# Patient Record
Sex: MALE | Race: BLACK OR AFRICAN AMERICAN | Employment: OTHER | URBAN - METROPOLITAN AREA
[De-identification: names, ages, dates, MRNs, and addresses within clinical notes are randomized per-mention and may not be internally consistent; named-entity substitution may affect disease eponyms.]

---

## 2018-01-25 ENCOUNTER — OFFICE VISIT (OUTPATIENT)
Dept: INTERNAL MEDICINE CLINIC | Age: 78
End: 2018-01-25

## 2018-01-25 ENCOUNTER — HOSPITAL ENCOUNTER (OUTPATIENT)
Dept: LAB | Age: 78
Discharge: HOME | End: 2018-01-25
Payer: COMMERCIAL

## 2018-01-25 VITALS
OXYGEN SATURATION: 96 % | HEART RATE: 57 BPM | TEMPERATURE: 97.2 F | DIASTOLIC BLOOD PRESSURE: 89 MMHG | SYSTOLIC BLOOD PRESSURE: 163 MMHG | WEIGHT: 159 LBS | BODY MASS INDEX: 29.26 KG/M2 | RESPIRATION RATE: 16 BRPM | HEIGHT: 62 IN

## 2018-01-25 DIAGNOSIS — E11.9 TYPE 2 DIABETES MELLITUS WITHOUT COMPLICATION, WITHOUT LONG-TERM CURRENT USE OF INSULIN (HCC): Chronic | ICD-10-CM

## 2018-01-25 DIAGNOSIS — I25.10 CHRONIC CORONARY ARTERY DISEASE: Chronic | ICD-10-CM

## 2018-01-25 DIAGNOSIS — I50.9 CONGESTIVE HEART FAILURE, UNSPECIFIED CONGESTIVE HEART FAILURE CHRONICITY, UNSPECIFIED CONGESTIVE HEART FAILURE TYPE: Chronic | ICD-10-CM

## 2018-01-25 DIAGNOSIS — Z79.899 MEDICATION MANAGEMENT: ICD-10-CM

## 2018-01-25 DIAGNOSIS — E78.5 HYPERLIPIDEMIA, UNSPECIFIED HYPERLIPIDEMIA TYPE: Primary | Chronic | ICD-10-CM

## 2018-01-25 DIAGNOSIS — Z13.5 SCREENING FOR GLAUCOMA: ICD-10-CM

## 2018-01-25 DIAGNOSIS — E78.5 HYPERLIPIDEMIA, UNSPECIFIED HYPERLIPIDEMIA TYPE: Chronic | ICD-10-CM

## 2018-01-25 LAB
ALBUMIN SERPL-MCNC: 4.7 G/DL (ref 3.4–5)
ALBUMIN/GLOB SERPL: 1.1 {RATIO} (ref 0.8–1.7)
ALP SERPL-CCNC: 184 U/L (ref 45–117)
ALT SERPL-CCNC: 134 U/L (ref 16–61)
ANION GAP SERPL CALC-SCNC: 11 MMOL/L (ref 3–18)
AST SERPL-CCNC: 73 U/L (ref 15–37)
BILIRUB SERPL-MCNC: 1.2 MG/DL (ref 0.2–1)
BUN SERPL-MCNC: 19 MG/DL (ref 7–18)
BUN/CREAT SERPL: 13 (ref 12–20)
CALCIUM SERPL-MCNC: 9.5 MG/DL (ref 8.5–10.1)
CHLORIDE SERPL-SCNC: 103 MMOL/L (ref 100–108)
CHOLEST SERPL-MCNC: 129 MG/DL
CO2 SERPL-SCNC: 26 MMOL/L (ref 21–32)
CREAT SERPL-MCNC: 1.5 MG/DL (ref 0.6–1.3)
CREAT UR-MCNC: 52.1 MG/DL (ref 30–125)
GLOBULIN SER CALC-MCNC: 4.1 G/DL (ref 2–4)
GLUCOSE SERPL-MCNC: 84 MG/DL (ref 74–99)
HDLC SERPL-MCNC: 70 MG/DL (ref 40–60)
HDLC SERPL: 1.8 {RATIO} (ref 0–5)
LDL-C, EXTERNAL: 46
LDLC SERPL CALC-MCNC: 46 MG/DL (ref 0–100)
LIPID PROFILE,FLP: ABNORMAL
MICROALBUMIN UR-MCNC: 75.8 MG/DL (ref 0–3)
MICROALBUMIN/CREAT UR-RTO: 1455 MG/G (ref 0–30)
POTASSIUM SERPL-SCNC: 4.1 MMOL/L (ref 3.5–5.5)
PROT SERPL-MCNC: 8.8 G/DL (ref 6.4–8.2)
SODIUM SERPL-SCNC: 140 MMOL/L (ref 136–145)
TRIGL SERPL-MCNC: 65 MG/DL (ref ?–150)
VLDLC SERPL CALC-MCNC: 13 MG/DL

## 2018-01-25 PROCEDURE — 82043 UR ALBUMIN QUANTITATIVE: CPT | Performed by: INTERNAL MEDICINE

## 2018-01-25 PROCEDURE — 80061 LIPID PANEL: CPT | Performed by: INTERNAL MEDICINE

## 2018-01-25 PROCEDURE — 36415 COLL VENOUS BLD VENIPUNCTURE: CPT | Performed by: INTERNAL MEDICINE

## 2018-01-25 PROCEDURE — 80053 COMPREHEN METABOLIC PANEL: CPT | Performed by: INTERNAL MEDICINE

## 2018-01-25 RX ORDER — LISINOPRIL 40 MG/1
40 TABLET ORAL DAILY
COMMUNITY
End: 2018-07-16 | Stop reason: SDUPTHER

## 2018-01-25 RX ORDER — ISOSORBIDE MONONITRATE 30 MG/1
30 TABLET, EXTENDED RELEASE ORAL DAILY
Refills: 3 | COMMUNITY
Start: 2018-01-17 | End: 2019-02-02 | Stop reason: SDUPTHER

## 2018-01-25 RX ORDER — AMLODIPINE BESYLATE 10 MG/1
10 TABLET ORAL DAILY
Refills: 6 | COMMUNITY
Start: 2018-01-17 | End: 2020-01-27 | Stop reason: SDUPTHER

## 2018-01-25 RX ORDER — AMIODARONE HYDROCHLORIDE 200 MG/1
200 TABLET ORAL DAILY
Refills: 5 | COMMUNITY
Start: 2018-01-13 | End: 2018-03-05 | Stop reason: SDUPTHER

## 2018-01-25 RX ORDER — ATORVASTATIN CALCIUM 40 MG/1
40 TABLET, FILM COATED ORAL DAILY
Refills: 5 | COMMUNITY
Start: 2018-01-17 | End: 2018-11-09 | Stop reason: SDUPTHER

## 2018-01-25 RX ORDER — POTASSIUM CHLORIDE 750 MG/1
TABLET, FILM COATED, EXTENDED RELEASE ORAL
Refills: 3 | COMMUNITY
Start: 2017-11-25 | End: 2019-02-12 | Stop reason: SDUPTHER

## 2018-01-25 RX ORDER — OLANZAPINE 2.5 MG/1
2.5 TABLET ORAL DAILY
Refills: 6 | COMMUNITY
Start: 2017-12-30 | End: 2022-04-21

## 2018-01-25 RX ORDER — APIXABAN 5 MG/1
5 TABLET, FILM COATED ORAL 2 TIMES DAILY
COMMUNITY
Start: 2018-01-19 | End: 2019-05-06 | Stop reason: SDUPTHER

## 2018-01-25 RX ORDER — ASPIRIN 81 MG/1
TABLET ORAL
Refills: 5 | COMMUNITY
Start: 2017-12-30 | End: 2018-05-03 | Stop reason: SDUPTHER

## 2018-01-25 RX ORDER — LANOLIN ALCOHOL/MO/W.PET/CERES
CREAM (GRAM) TOPICAL
Refills: 1 | COMMUNITY
Start: 2017-12-24 | End: 2018-11-09 | Stop reason: SDUPTHER

## 2018-01-25 RX ORDER — CARVEDILOL 6.25 MG/1
6.25 TABLET ORAL 2 TIMES DAILY
Refills: 6 | COMMUNITY
Start: 2018-01-16 | End: 2018-09-26 | Stop reason: SDUPTHER

## 2018-01-25 RX ORDER — FLUTICASONE PROPIONATE 44 UG/1
AEROSOL, METERED RESPIRATORY (INHALATION)
COMMUNITY
End: 2020-12-22

## 2018-01-25 NOTE — MR AVS SNAPSHOT
Samra Anthony 
 
 
 [address] [facility] 83 [address] 
[phone] Patient: Brown Aiken MRN: [id number] Visit Information Date & Time Provider Department Dept. Phone Encounter #  
 1/25/2018 11:30 AM Kim Friend MD [address] [phone] [id number] Follow-up Instructions Return in about 6 weeks (around 3/8/2018) for 30 minutes, , HTN, DM, cholesterol. Your Appointments 8/1/2018  3:00 PM  
ULTRASOUND with [facility] ULTRASOUND [facility] ([facility]) Appt Note: Follow up in 1 year to review the results of his renal ultrasound, repeat a same-day PSA, and PVR. [address] [address]  
[phone]  
  
   
 [facility] 68 [address]  
  
    
 8/1/2018  3:30 PM  
ESTABLISHED PATIENT with Yeni Arana MD  
[facility] [address]) Appt Note: Return in about 1 year (around 8/1/2018). w/ sd  
 301 [facility] [place] [address]201 [facility] [address] [facility] [address]  
  
   
 301 [facility], [address] [place] [address] Upcoming Health Maintenance Date Due HEMOGLOBIN A1C Q6M 1940 LIPID PANEL Q1 1940 FOOT EXAM Q1 2/14/1950 MICROALBUMIN Q1 2/14/1950 EYE EXAM RETINAL OR DILATED Q1 2/14/1950 GLAUCOMA SCREENING Q2Y 2/14/2005 Pneumococcal 65+ High/Highest Risk (1 of 2 - PCV13) 2/14/2005 MEDICARE YEARLY EXAM 2/14/2005 ZOSTER VACCINE AGE 60> 1/25/2019* DTaP/Tdap/Td series (1 - Tdap) 1/25/2019* *Topic was postponed. The date shown is not the original due date. Allergies as of 1/25/2018  Review Complete On: 1/25/2018 By: Kim Friend MD  
 No Known Allergies Current Immunizations  Never Reviewed Name Date Influenza Vaccine 11/30/2015 12:00 AM  
  
 Not reviewed this visit You Were Diagnosed With   
  
 Codes Comments Hyperlipidemia, unspecified hyperlipidemia type    -  Primary ICD-10-CM: E78.5 ICD-9-CM: 272.4 Chronic coronary artery disease     ICD-10-CM: I25.10 ICD-9-CM: 414.00 Congestive heart failure, unspecified congestive heart failure chronicity, unspecified congestive heart failure type ([facility].)     ICD-10-CM: I50.9 ICD-9-CM: 428.0 Type 2 diabetes mellitus without complication, without long-term current use of insulin (HCC)     ICD-10-CM: E11.9 ICD-9-CM: 250.00 Screening for glaucoma     ICD-10-CM: Z13.5 ICD-9-CM: V80.1 Medication management     ICD-10-CM: Z79.899 ICD-9-CM: V58.69 Vitals BP Pulse Temp Resp Height(growth percentile) Weight(growth percentile) 163/89 (!) 57 97.2 °F (36.2 °C) (Oral) 16 5' 2\" (1.575 m) 159 lb (72.1 kg) SpO2 BMI Smoking Status 96% 29.08 kg/m2 Former Smoker BMI and BSA Data Body Mass Index Body Surface Area 29.08 kg/m 2 1.78 m 2 Preferred Pharmacy Pharmacy Name Phone [id number] [place], [address] [facility] [phone] Your Updated Medication List  
  
   
This list is accurate as of: 1/25/18 12:40 PM.  Always use your most recent med list.  
  
  
  
  
 amiodarone 200 mg tablet Commonly known as:  CORDARONE Take 200 mg by mouth daily. amLODIPine 10 mg tablet Commonly known as:  Bosie Shield Take 10 mg by mouth daily. aspirin delayed-release 81 mg tablet TAKE 1 TABLET BY MOUTH DAILY  
  
 atorvastatin 40 mg tablet Commonly known as:  LIPITOR Take 40 mg by mouth daily. carvedilol 6.25 mg tablet Commonly known as:  Lanedavid Mariano Take 6.25 mg by mouth two (2) times a day. Take 1 tab twice daily with morning and evening meals ELIQUIS 5 mg tablet Generic drug:  apixaban Take 5 mg by mouth two (2) times a day. FLOVENT HFA 44 mcg/actuation inhaler Generic drug:  fluticasone Take  by inhalation. 2 inhalation inhale orally every 12 ours isosorbide mononitrate ER 30 mg tablet Commonly known as:  IMDUR Take 30 mg by mouth daily. KLOR-CON 10 10 mEq tablet Generic drug:  potassium chloride SR  
TAKE 1 TABLET BY MOUTH DAILY  
  
 lisinopril 40 mg tablet Commonly known as:  Christy [facility] Take 40 mg by mouth daily. magnesium oxide 400 mg tablet Commonly known as:  MAG-OX  
TAKE 1 TABLET BY MOUTH DAILY  
  
 OLANZapine 2.5 mg tablet Commonly known as:  ZyPREXA Take 2.5 mg by mouth daily. Take 1 tab PO at bedtime We Performed the Following REFERRAL TO CARDIOLOGY [MXI90 Custom] Comments:  
 New patient visit. Has jf followed by [facility] but insurance changed REFERRAL TO OPHTHALMOLOGY [REF57 Custom] Follow-up Instructions Return in about 6 weeks (around 3/8/2018) for 30 minutes, , HTN, DM, cholesterol. To-Do List   
 01/25/2018 Lab:  LIPID PANEL   
  
 01/25/2018 Lab:  METABOLIC PANEL, COMPREHENSIVE   
  
 01/25/2018 Lab:  MICROALBUMIN, UR, RAND W/ MICROALBUMIN/CREA RATIO Referral Information Referral ID Referred By Referred To  
  
 [id number] [facility] Vince Acevedo MD   
   [facility] Phone: [phone] Fax: [phone] Visits Status Start Date End Date 1 New Request 1/25/18 1/25/19 If your referral has a status of pending review or denied, additional information will be sent to support the outcome of this decision. Referral ID Referred By Referred To  
 [id number] [facility] Not Available Visits Status Start Date End Date 1 New Request 1/25/18 1/25/19 If your referral has a status of pending review or denied, additional information will be sent to support the outcome of this decision. Introducing [facility]!    
 Vijay Velasco introduces [employer] patient portal. Now you can access parts of your medical record, email your doctor's office, and request medication refills online. 1. In your internet browser, go to [email or web address]. [employer]/[facility] 2. Click on the First Time User? Click Here link in the Sign In box. You will see the New Member Sign Up page. 3. Enter your [employer]ia Access Code exactly as it appears below. You will not need to use this code after youve completed the sign-up process. If you do not sign up before the expiration date, you must request a new code. · [employer] Access Code: [email or web address] Expires: 4/25/2018 12:40 PM 
 
4. Enter the last four digits of your Social Security Number (xxxx) and Date of Birth (mm/dd/yyyy) as indicated and click Submit. You will be taken to the next sign-up page. 5. Create a Listia ID. This will be your Listia login ID and cannot be changed, so think of one that is secure and easy to remember. 6. Create a Listia password. You can change your password at any time. 7. Enter your Password Reset Question and Answer. This can be used at a later time if you forget your password. 8. Enter your e-mail address. You will receive e-mail notification when new information is available in [address] 9. Click Sign Up. You can now view and download portions of your medical record. 10. Click the Download Summary menu link to download a portable copy of your medical information. If you have questions, please visit the Frequently Asked Questions section of the [employer] website. Remember, [employer]ia is NOT to be used for urgent needs. For medical emergencies, dial 911. Now available from your iPhone and Android! Please provide this summary of care documentation to your next provider. Your primary care clinician is listed as Álvaro Rangel. If you have any questions after today's visit, please call [phone].

## 2018-01-25 NOTE — PROGRESS NOTES
HISTORY OF PRESENT ILLNESS  Destinee Fraag is a 68 y.o. male. Visit Vitals    /89    Pulse (!) 57    Temp 97.2 °F (36.2 °C) (Oral)    Resp 16    Ht 5' 2\" (1.575 m)    Wt 159 lb (72.1 kg)    SpO2 96%    BMI 29.08 kg/m2       HPI Comments: Pt was seeing another PCP for several years. Insurance changed so he needs another PCP    See HX for full list  His main issue is cardiac related. Has h/o HTN, CAD, PAF and syncope. Also associated pulmonary HTN and pulmonic valve stenosis. He has been on medication to control his illness for several years  He has been followed by cardiology for years. Also diabetic, chronic, and treated with medication. New Patient   The history is provided by the patient. This is a new problem. Pertinent negatives include no chest pain, no abdominal pain, no headaches and no shortness of breath. Review of Systems   Constitutional: Negative for chills and fever. HENT: Negative. Negative for congestion, sore throat and tinnitus. Eyes:        Reading glasses   Respiratory: Negative for cough, shortness of breath and wheezing. Cardiovascular: Negative for chest pain, palpitations, claudication and leg swelling. Gastrointestinal: Positive for constipation (intermittent constipation. ). Negative for abdominal pain and vomiting.        H/o acid reflux. Musculoskeletal:        H/o gout. No recent attacks   Neurological: Negative for dizziness, sensory change, speech change, focal weakness and headaches. Psychiatric/Behavioral:        Dx'd with schizophrenia. Dx'd in [place]. Physical Exam   Constitutional: He is oriented to person, place, and time. He appears well-developed and well-nourished. No distress. Cardiovascular: Normal rate and regular rhythm. Pulmonary/Chest: Effort normal and breath sounds normal.   Musculoskeletal: He exhibits no edema. Neurological: He is alert and oriented to person, place, and time. Skin: Skin is warm and dry.  He is not diaphoretic. Psychiatric: He has a normal mood and affect. Nursing note and vitals reviewed. ASSESSMENT and PLAN    ICD-10-CM ICD-9-CM    1. Hyperlipidemia, unspecified hyperlipidemia type E78.5 272.4 amiodarone (CORDARONE) 200 mg tablet      amLODIPine (NORVASC) 10 mg tablet      ELIQUIS 5 mg tablet      aspirin delayed-release 81 mg tablet      atorvastatin (LIPITOR) 40 mg tablet      carvedilol (COREG) 6.25 mg tablet      REFERRAL TO CARDIOLOGY      LIPID PANEL   2. Chronic coronary artery disease I25.10 414.00 amiodarone (CORDARONE) 200 mg tablet      amLODIPine (NORVASC) 10 mg tablet      ELIQUIS 5 mg tablet      aspirin delayed-release 81 mg tablet      atorvastatin (LIPITOR) 40 mg tablet      carvedilol (COREG) 6.25 mg tablet      REFERRAL TO CARDIOLOGY      LIPID PANEL   3. Congestive heart failure, unspecified congestive heart failure chronicity, unspecified congestive heart failure type (HCC) I50.9 428.0 amiodarone (CORDARONE) 200 mg tablet      amLODIPine (NORVASC) 10 mg tablet      ELIQUIS 5 mg tablet      aspirin delayed-release 81 mg tablet      atorvastatin (LIPITOR) 40 mg tablet      carvedilol (COREG) 6.25 mg tablet      REFERRAL TO CARDIOLOGY   4. Type 2 diabetes mellitus without complication, without long-term current use of insulin ([facility]) G71.3 336.42 METABOLIC PANEL, COMPREHENSIVE      MICROALBUMIN, UR, RAND W/ MICROALBUMIN/CREA RATIO   5. Screening for glaucoma Z13.5 V80.1 REFERRAL TO OPHTHALMOLOGY   6. Medication management Z79.899 V58.69 isosorbide mononitrate ER (IMDUR) 30 mg tablet      magnesium oxide (MAG-OX) 400 mg tablet      OLANZapine (ZYPREXA) 2.5 mg tablet      KLOR-CON 10 10 mEq tablet      lisinopril (PRINIVIL, ZESTRIL) 40 mg tablet      fluticasone (FLOVENT HFA) 44 mcg/actuation inhaler       Past medical history, surgical history, family history, and social history reviewed with patient    Available hospital record reviewed    BP is up today.  But we are just getting to know him and will observe for now    Refer to cardiology for f/u    Refer to ophthalmology    Update lab    Incidentally,  Discussed BMI/weight, lifestyle, diet and exercise. Discussed effect on blood pressure, blood sugar, and joints especially  Focus on limiting white carbs, portion control, and moving more.     F/u 6-8 weeks

## 2018-01-25 NOTE — PROGRESS NOTES
ROOM # 6    Sascha Florentino presents today for   Chief Complaint   Patient presents with    New Patient       Sascha Florentino preferred language for health care discussion is english/other. Is someone accompanying this pt? no    Is the patient using any DME equipment during [address]? no    Depression Screening:  PHQ over the last two weeks 1/25/2018   Little interest or pleasure in doing things Not at all   Feeling down, depressed or hopeless Not at all   Total Score PHQ 2 0       Learning Assessment:  Learning Assessment 1/25/2018   PRIMARY LEARNER Patient   HIGHEST LEVEL OF EDUCATION - PRIMARY LEARNER  DID NOT GRADUATE HIGH SCHOOL   BARRIERS PRIMARY LEARNER NONE   CO-LEARNER CAREGIVER No   PRIMARY LANGUAGE ENGLISH   LEARNER PREFERENCE PRIMARY VIDEOS   ANSWERED BY patient   RELATIONSHIP SELF       Abuse Screening:  No flowsheet data found. Fall Risk  Fall Risk Assessment, last 12 mths 1/25/2018   Able to walk? Yes   Fall in past 12 months? Yes   Fall with injury? Yes   Number of falls in past 12 months 1   Fall Risk Score 2       Health Maintenance reviewed and discussed per provider. Yes    Requesting records from [address] [place] [address] Directive:  1. Do you have an advance directive in place? Patient Reply: yes     2. If not, would you like material regarding how to put one in place? Patient Reply: no    Coordination of Care:  1. Have you been to the ER, urgent care clinic since your last visit? Hospitalized since your last visit? Yes [facility] for chest weakness    2. Have you seen or consulted any other health care providers outside of the [address] [place] [address] since your last visit? Include any pap smears or colon screening.  no

## 2018-01-27 DIAGNOSIS — R79.89 ABNORMAL LFTS: Primary | ICD-10-CM

## 2018-02-03 ENCOUNTER — HOSPITAL ENCOUNTER (OUTPATIENT)
Dept: LAB | Age: 78
Discharge: HOME | End: 2018-02-03
Payer: COMMERCIAL

## 2018-02-03 ENCOUNTER — OFFICE VISIT (OUTPATIENT)
Dept: INTERNAL MEDICINE CLINIC | Age: 78
End: 2018-02-03

## 2018-02-03 VITALS
HEIGHT: 62 IN | SYSTOLIC BLOOD PRESSURE: 153 MMHG | WEIGHT: 157 LBS | RESPIRATION RATE: 14 BRPM | HEART RATE: 54 BPM | OXYGEN SATURATION: 93 % | TEMPERATURE: 96.9 F | BODY MASS INDEX: 28.89 KG/M2 | DIASTOLIC BLOOD PRESSURE: 83 MMHG

## 2018-02-03 DIAGNOSIS — M54.31 SCIATICA OF RIGHT SIDE: Primary | ICD-10-CM

## 2018-02-03 DIAGNOSIS — R79.89 ABNORMAL LFTS: ICD-10-CM

## 2018-02-03 DIAGNOSIS — I10 ESSENTIAL HYPERTENSION: ICD-10-CM

## 2018-02-03 PROBLEM — E11.21 TYPE 2 DIABETES MELLITUS WITH NEPHROPATHY (HCC): Status: ACTIVE | Noted: 2018-02-03

## 2018-02-03 LAB
IRON SATN MFR SERPL: 21 %
IRON SERPL-MCNC: 79 UG/DL (ref 50–175)
TIBC SERPL-MCNC: 381 UG/DL (ref 250–450)

## 2018-02-03 PROCEDURE — 86803 HEPATITIS C AB TEST: CPT | Performed by: INTERNAL MEDICINE

## 2018-02-03 PROCEDURE — 86038 ANTINUCLEAR ANTIBODIES: CPT | Performed by: INTERNAL MEDICINE

## 2018-02-03 PROCEDURE — 86706 HEP B SURFACE ANTIBODY: CPT | Performed by: INTERNAL MEDICINE

## 2018-02-03 PROCEDURE — 83540 ASSAY OF IRON: CPT | Performed by: INTERNAL MEDICINE

## 2018-02-03 PROCEDURE — 84155 ASSAY OF PROTEIN SERUM: CPT | Performed by: INTERNAL MEDICINE

## 2018-02-03 PROCEDURE — 87340 HEPATITIS B SURFACE AG IA: CPT | Performed by: INTERNAL MEDICINE

## 2018-02-03 PROCEDURE — 86704 HEP B CORE ANTIBODY TOTAL: CPT | Performed by: INTERNAL MEDICINE

## 2018-02-03 NOTE — MR AVS SNAPSHOT
[facility] 
 
 
 [address] [address] 
[phone] Patient: Zuly Grullon MRN: [id number] NNM:[id number] Visit Information Date & Time Provider Department Dept. Phone Encounter #  
 2/3/2018 11:15 AM Loree Cortez, [address] [place] [address] [phone] Follow-up Instructions Return if symptoms worsen or fail to improve. Your Appointments 2/9/2018  1:30 PM  
New Patient with Jase Clay MD  
Cardio Specialist at [address]) Appt Note: NP ref by Dr Hue Cardenas, prev cardio at [facility], Referral in [address] [place] [address]. -[facility] [address] [address]  
  
   
 [address] [facility] [address]  
  
    
 3/8/2018  2:30 PM  
Office Visit with Jagdish Liriano MD  
[address] [place] [address] ([address] [place] [address] Road) Appt Note: 6 week f/u htn, dm, chol  
 [address]  
  
    
 8/1/2018  3:00 PM  
ULTRASOUND with [facility] ULTRASOUND [facility] ([address] [place] [address]) Appt Note: Follow up in 1 year to review the results of his renal ultrasound, repeat a same-day PSA, and PVR. [facility] [address] [facility] [address]  
[phone]  
  
   
 [facility] [address] [id number]  
  
    
 8/1/2018  3:30 PM  
ESTABLISHED PATIENT with Christine Dalton MD  
Urology of [address]) Appt Note: Return in about 1 year (around 8/1/2018). w/ sd  
 [address], [place] [address]  
  
   
 [facility], [address] [facility] [address] Upcoming Health Maintenance Date Due HEMOGLOBIN A1C Q6M 1940 FOOT EXAM Q1 2/14/1950 EYE EXAM RETINAL OR DILATED Q1 2/14/1950 GLAUCOMA SCREENING Q2Y 2/14/2005 Pneumococcal 65+ High/Highest Risk (1 of 2 - PCV13) 2/14/2005 MEDICARE YEARLY EXAM 2/14/2005 ZOSTER VACCINE AGE 60> 1/25/2019* DTaP/Tdap/Td series (1 - Tdap) 1/25/2019* MICROALBUMIN Q1 1/25/2019 LIPID PANEL Q1 1/25/2019 *Topic was postponed. The date shown is not the original due date. Allergies as of 2/3/2018  Review Complete On: 2/3/2018 By: Mary Andre, DO No Known Allergies Current Immunizations  Never Reviewed Name Date Influenza Vaccine 11/30/2015 12:00 AM  
  
 Not reviewed this visit You Were Diagnosed With   
  
 Codes Comments Sciatica of right side    -  Primary ICD-10-CM: M54.31 
ICD-9-CM: 724.3 Vitals BP Pulse Temp Resp Height(growth percentile) Weight(growth percentile) 153/83 (BP 1 Location: Right arm, BP Patient Position: Sitting) (!) 54 96.9 °F (36.1 °C) (Oral) 14 5' 2\" (1.575 m) 157 lb (71.2 kg) SpO2 BMI Smoking Status 93% 28.72 kg/m2 Former Smoker Vitals History BMI and BSA Data Body Mass Index Body Surface Area 28.72 kg/m 2 1.76 m 2 Preferred Pharmacy Pharmacy Name Phone [id number], [address] [facility] Aminah Buenrostro [phone] Your Updated Medication List  
  
   
This list is accurate as of: 2/3/18 11:48 AM.  Always use your most recent med list.  
  
  
  
  
 amiodarone 200 mg tablet Commonly known as:  CORDARONE Take 200 mg by mouth daily. amLODIPine 10 mg tablet Commonly known as:  Senora Gretel Take 10 mg by mouth daily. aspirin delayed-release 81 mg tablet TAKE 1 TABLET BY MOUTH DAILY  
  
 atorvastatin 40 mg tablet Commonly known as:  LIPITOR Take 40 mg by mouth daily. carvedilol 6.25 mg tablet Commonly known as:  Ivania Coco Take 6.25 mg by mouth two (2) times a day. Take 1 tab twice daily with morning and evening meals ELIQUIS 5 mg tablet Generic drug:  apixaban Take 5 mg by mouth two (2) times a day. FLOVENT HFA 44 mcg/actuation inhaler Generic drug:  fluticasone Take  by inhalation. 2 inhalation inhale orally every 12 ours  
  
 isosorbide mononitrate ER 30 mg tablet Commonly known as:  IMDUR Take 30 mg by mouth daily. KLOR-CON 10 10 mEq tablet Generic drug:  potassium chloride SR  
TAKE 1 TABLET BY MOUTH DAILY  
  
 lisinopril 40 mg tablet Commonly known as:  Metta Lawman Take 40 mg by mouth daily. magnesium oxide 400 mg tablet Commonly known as:  MAG-OX  
TAKE 1 TABLET BY MOUTH DAILY  
  
 OLANZapine 2.5 mg tablet Commonly known as:  ZyPREXA Take 2.5 mg by mouth daily. Take 1 tab PO at bedtime Follow-up Instructions Return if symptoms worsen or fail to improve. Patient Instructions Sciatica: Care Instructions Your Care Instructions Sciatica (say \"ytv-YJ-mv-kuh\") is an irritation of one of the sciatic nerves, which come from the spinal cord in the lower back. The sciatic nerves and their branches extend down through the buttock to the foot. Sciatica can develop when an injured disc in the back presses against a spinal nerve root. Its main symptom is pain, numbness, or weakness that is often worse in the leg or foot than in the back. Sciatica often will improve and go away with time. Early treatment usually includes medicines and exercises to relieve pain. Follow-up care is a key part of your treatment and safety. Be sure to make and go to all appointments, and call your doctor if you are having problems. It's also a good idea to know your test results and keep a list of the medicines you take. How can you care for yourself at home? · Take pain medicines exactly as directed. ¨ If the doctor gave you a prescription medicine for pain, take it as prescribed. ¨ If you are not taking a prescription pain medicine, ask your doctor if you can take an over-the-counter medicine. · Use heat or ice to relieve pain. ¨ To apply heat, put a warm water bottle, heating pad set on low, or warm cloth on your back. Do not go to sleep with a heating pad on your skin. ¨ To use ice, put ice or a cold pack on the area for 10 to 20 minutes at a time. Put a thin cloth between the ice and your skin. · Avoid sitting if possible, unless it feels better than standing. · Alternate lying down with short walks. Increase your walking distance as you are able to without making your symptoms worse. · Do not do anything that makes your symptoms worse. When should you call for help? Call 911 anytime you think you may need emergency care. For example, call if: 
· You are unable to move a leg at all. Call your doctor now or seek immediate medical care if: 
· You have new or worse symptoms in your legs or buttocks. Symptoms may include: ¨ Numbness or tingling. ¨ Weakness. ¨ Pain. · You lose bladder or bowel control. Watch closely for changes in your health, and be sure to contact your doctor if: 
· You are not getting better as expected. Where can you learn more? Go to http://venkatesh-fran.info/. Enter [phone] in the search box to learn more about \"Sciatica: Care Instructions. \" Current as of: March 21, 2017 Content Version: 11.4 © 8917-9083 FreeBorders. Care instructions adapted under license by Codewise (which disclaims liability or warranty for this information). If you have questions about a medical condition or this instruction, always ask your healthcare professional. [place] [address] any warranty or liability for your use of this information. Back Stretches: Exercises Your Care Instructions Here are some examples of exercises for stretching your back. Start each exercise slowly. Ease off the exercise if you start to have pain. Your doctor or physical therapist will tell you when you can start these exercises and which ones will work best for you. How to do the exercises Overhead stretch 1. Stand comfortably with your feet shoulder-width apart. 2. Looking straight ahead, raise both arms over your head and reach toward the ceiling. Do not allow your head to tilt back. 3. Hold for 15 to 30 seconds, then lower your arms to your sides. 4. Repeat 2 to 4 times. Side stretch 1. Stand comfortably with your feet shoulder-width apart. 2. Raise one arm over your head, and then lean to the other side. 3. Slide your hand down your leg as you let the weight of your arm gently stretch your side muscles. Hold for 15 to 30 seconds. 4. Repeat 2 to 4 times on each side. Press-up 1. Lie on your stomach, supporting your body with your forearms. 2. Press your elbows down into the floor to raise your upper back. As you do this, relax your stomach muscles and allow your back to arch without using your back muscles. As your press up, do not let your hips or pelvis come off the floor. 3. Hold for 15 to 30 seconds, then relax. 4. Repeat 2 to 4 times. Relax and rest 
 
1. Lie on your back with a rolled towel under your neck and a pillow under your knees. Extend your arms comfortably to your sides. 2. Relax and breathe normally. 3. Remain in this position for about 10 minutes. 4. If you can, do this 2 or 3 times each day. Follow-up care is a key part of your treatment and safety. Be sure to make and go to all appointments, and call your doctor if you are having problems. It's also a good idea to know your test results and keep a list of the medicines you take. Where can you learn more? Go to http://venkatesh-fran.info/. Enter G177 in the search box to learn more about \"Back Stretches: Exercises. \" Current as of: March 21, 2017 Content Version: 11.4 © 0167-1453 Healthwise, Incorporated.  Care instructions adapted under license by Peerio (which disclaims liability or warranty for this information). If you have questions about a medical condition or this instruction, always ask your healthcare professional. [address] any warranty or liability for your use of this information. Sciatica: Exercises Your Care Instructions Here are some examples of typical rehabilitation exercises for your condition. Start each exercise slowly. Ease off the exercise if you start to have pain. Your doctor or physical therapist will tell you when you can start these exercises and which ones will work best for you. When you are not being active, find a comfortable position for rest. Some people are comfortable on the floor or a medium-firm bed with a small pillow under their head and another under their knees. Some people prefer to lie on their side with a pillow between their knees. Don't stay in one position for too long. Take short walks (10 to 20 minutes) every 2 to 3 hours. Avoid slopes, hills, and stairs until you feel better. Walk only distances you can manage without pain, especially leg pain. How to do the exercises Back stretches 5. Get down on your hands and knees on the floor. 6. Relax your head and allow it to droop. Round your back up toward the ceiling until you feel a nice stretch in your upper, middle, and lower back. Hold this stretch for as long as it feels comfortable, or about 15 to 30 seconds. 7. Return to the starting position with a flat back while you are on your hands and knees. 8. Let your back sway by pressing your stomach toward the floor. Lift your buttocks toward the ceiling. 9. Hold this position for 15 to 30 seconds. 10. Repeat 2 to 4 times. Follow-up care is a key part of your treatment and safety. Be sure to make and go to all appointments, and call your doctor if you are having problems. It's also a good idea to know your test results and keep a list of the medicines you take. Where can you learn more? Go to http://venkatesh-fran.info/. Enter U536 in the search box to learn more about \"Sciatica: Exercises. \" Current as of: March 21, 2017 Content Version: 11.4 © 6694-3336 Healthwise, [employer]. Care instructions adapted under license by gokit (which disclaims liability or warranty for this information). If you have questions about a medical condition or this instruction, always ask your healthcare professional. [place] [address] any warranty or liability for your use of this information. Introducing \[facility] [place] [facility]\"" & HEALTH SERVICES! Sheila Arteaga introduces Personal On Demand patient portal. Now you can access parts of your medical record, email your doctor's office, and request medication refills online. 1. In your internet browser, go to [email or web address] 2. Click on the First Time User? Click Here link in the Sign In box. You will see the New Member Sign Up page. 3. Enter your Personal On Demand Access Code exactly as it appears below. You will not need to use this code after youve completed the sign-up process. If you do not sign up before the expiration date, you must request a new code. · Personal On Demand Access Code: [email or web address] Expires: 4/25/2018 12:40 PM 
 
4. Enter the last four digits of your Social Security Number (xxxx) and Date of Birth (mm/dd/yyyy) as indicated and click Submit. You will be taken to the next sign-up page. 5. Create a Personal On Demand ID. This will be your Personal On Demand login ID and cannot be changed, so think of one that is secure and easy to remember. 6. Create a Personal On Demand password. You can change your password at any time. 7. Enter your Password Reset Question and Answer. This can be used at a later time if you forget your password. 8. Enter your e-mail address. You will receive e-mail notification when new information is available in [address] 9. Click Sign Up. You can now view and download portions of your medical record. 10. Click the Download Summary menu link to download a portable copy of your medical information. If you have questions, please visit the Frequently Asked Questions section of the Openera website. Remember, Openera is NOT to be used for urgent needs. For medical emergencies, dial 911. Now available from your iPhone and Android! Please provide this summary of care documentation to your next provider. Your primary care clinician is listed as Carol Walls. If you have any questions after today's visit, please call [phone].

## 2018-02-03 NOTE — PROGRESS NOTES
Patient presents to the clinic for right side lower back pain that has gotten worse. Patient complains of pain radiating down his right leg and numbness. Flu Shot Requested: received    Depression Screening:  PHQ over the last two weeks 2/3/2018 1/25/2018   Little interest or pleasure in doing things Not at all Not at all   Feeling down, depressed or hopeless Not at all Not at all   Total Score PHQ 2 0 0       Learning Assessment:  Learning Assessment 1/25/2018   PRIMARY LEARNER Patient   HIGHEST LEVEL OF EDUCATION - PRIMARY LEARNER  DID NOT GRADUATE HIGH SCHOOL   BARRIERS PRIMARY LEARNER NONE   CO-LEARNER CAREGIVER No   PRIMARY LANGUAGE ENGLISH   LEARNER PREFERENCE PRIMARY VIDEOS   ANSWERED BY patient   RELATIONSHIP SELF       Abuse Screening:  No flowsheet data found. Health Maintenance reviewed and discussed per provider: no     Coordination of Care:    1. Have you been to the ER, urgent care clinic since your last visit? Hospitalized since your last visit? yes    2. Have you seen or consulted any other health care providers outside of the [address] [place] [address] since your last visit? Include any pap smears or colon screening.  no

## 2018-02-03 NOTE — PATIENT INSTRUCTIONS
Sciatica: Care Instructions  Your Care Instructions    Sciatica (say \"tot-XL-fq-kuh\") is an irritation of one of the sciatic nerves, which come from the spinal cord in the lower back. The sciatic nerves and their branches extend down through the buttock to the foot. Sciatica can develop when an injured disc in the back presses against a spinal nerve root. Its main symptom is pain, numbness, or weakness that is often worse in the leg or foot than in the back. Sciatica often will improve and go away with time. Early treatment usually includes medicines and exercises to relieve pain. Follow-up care is a key part of your treatment and safety. Be sure to make and go to all appointments, and call your doctor if you are having problems. It's also a good idea to know your test results and keep a list of the medicines you take. How can you care for yourself at home? · Take pain medicines exactly as directed. ¨ If the doctor gave you a prescription medicine for pain, take it as prescribed. ¨ If you are not taking a prescription pain medicine, ask your doctor if you can take an over-the-counter medicine. · Use heat or ice to relieve pain. ¨ To apply heat, put a warm water bottle, heating pad set on low, or warm cloth on your back. Do not go to sleep with a heating pad on your skin. ¨ To use ice, put ice or a cold pack on the area for 10 to 20 minutes at a time. Put a thin cloth between the ice and your skin. · Avoid sitting if possible, unless it feels better than standing. · Alternate lying down with short walks. Increase your walking distance as you are able to without making your symptoms worse. · Do not do anything that makes your symptoms worse. When should you call for help? Call 911 anytime you think you may need emergency care. For example, call if:  · You are unable to move a leg at all.   Call your doctor now or seek immediate medical care if:  · You have new or worse symptoms in your legs or buttocks. Symptoms may include:  ¨ Numbness or tingling. ¨ Weakness. ¨ Pain. · You lose bladder or bowel control. Watch closely for changes in your health, and be sure to contact your doctor if:  · You are not getting better as expected. Where can you learn more? Go to http://venkatesh-fran.info/. Enter [phone] in the search box to learn more about \"Sciatica: Care Instructions. \"  Current as of: March 21, 2017  Content Version: 11.4  © 1222-1571 iZoca. Care instructions adapted under license by dooyoo (which disclaims liability or warranty for this information). If you have questions about a medical condition or this instruction, always ask your healthcare professional. [address] any warranty or liability for your use of this information. Back Stretches: Exercises  Your Care Instructions  Here are some examples of exercises for stretching your back. Start each exercise slowly. Ease off the exercise if you start to have pain. Your doctor or physical therapist will tell you when you can start these exercises and which ones will work best for you. How to do the exercises  Overhead stretch    1. Stand comfortably with your feet shoulder-width apart. 2. Looking straight ahead, raise both arms over your head and reach toward the ceiling. Do not allow your head to tilt back. 3. Hold for 15 to 30 seconds, then lower your arms to your sides. 4. Repeat 2 to 4 times. Side stretch    1. Stand comfortably with your feet shoulder-width apart. 2. Raise one arm over your head, and then lean to the other side. 3. Slide your hand down your leg as you let the weight of your arm gently stretch your side muscles. Hold for 15 to 30 seconds. 4. Repeat 2 to 4 times on each side. Press-up    1. Lie on your stomach, supporting your body with your forearms. 2. Press your elbows down into the floor to raise your upper back.  As you do this, relax your stomach muscles and allow your back to arch without using your back muscles. As your press up, do not let your hips or pelvis come off the floor. 3. Hold for 15 to 30 seconds, then relax. 4. Repeat 2 to 4 times. Relax and rest    1. Lie on your back with a rolled towel under your neck and a pillow under your knees. Extend your arms comfortably to your sides. 2. Relax and breathe normally. 3. Remain in this position for about 10 minutes. 4. If you can, do this 2 or 3 times each day. Follow-up care is a key part of your treatment and safety. Be sure to make and go to all appointments, and call your doctor if you are having problems. It's also a good idea to know your test results and keep a list of the medicines you take. Where can you learn more? Go to http://venkateshWatchPartyfran.info/. Enter H264 in the search box to learn more about \"Back Stretches: Exercises. \"  Current as of: March 21, 2017  Content Version: 11.4  © 9445-8704 [employer]arma. Care instructions adapted under license by Deckerton (which disclaims liability or warranty for this information). If you have questions about a medical condition or this instruction, always ask your healthcare professional. [address] any warranty or liability for your use of this information. Sciatica: Exercises  Your Care Instructions  Here are some examples of typical rehabilitation exercises for your condition. Start each exercise slowly. Ease off the exercise if you start to have pain. Your doctor or physical therapist will tell you when you can start these exercises and which ones will work best for you. When you are not being active, find a comfortable position for rest. Some people are comfortable on the floor or a medium-firm bed with a small pillow under their head and another under their knees. Some people prefer to lie on their side with a pillow between their knees.  Don't stay in one position for too long. Take short walks (10 to 20 minutes) every 2 to 3 hours. Avoid slopes, hills, and stairs until you feel better. Walk only distances you can manage without pain, especially leg pain. How to do the exercises  Back stretches    5. Get down on your hands and knees on the floor. 6. Relax your head and allow it to droop. Round your back up toward the ceiling until you feel a nice stretch in your upper, middle, and lower back. Hold this stretch for as long as it feels comfortable, or about 15 to 30 seconds. 7. Return to the starting position with a flat back while you are on your hands and knees. 8. Let your back sway by pressing your stomach toward the floor. Lift your buttocks toward the ceiling. 9. Hold this position for 15 to 30 seconds. 10. Repeat 2 to 4 times. Follow-up care is a key part of your treatment and safety. Be sure to make and go to all appointments, and call your doctor if you are having problems. It's also a good idea to know your test results and keep a list of the medicines you take. Where can you learn more? Go to http://venkatesh-fran.info/. Enter D129 in the search box to learn more about \"Sciatica: Exercises. \"  Current as of: March 21, 2017  Content Version: 11.4  © 5886-2532 Healthwise, Incorporated. Care instructions adapted under license by [employer] (which disclaims liability or warranty for this information). If you have questions about a medical condition or this instruction, always ask your healthcare professional. [place] [address] any warranty or liability for your use of this information.

## 2018-02-03 NOTE — PROGRESS NOTES
SUBJECTIVE:   HPI:  Fahad Hope is a 68 y.o. male who complains of right lower back pain/numbness in buttocks x 1 week. Patient has had this problem before. Patient is new to this practice and saw Dr. Ellen Artis  Last week. Here for lab work today to check elevated liver enzymes. Patient reports pain/numnbess on right side/buttocks worse while sitting; occasionally laying on right side. Denies any weakness, no bladder/bowel incontinence. Sitting on hard chairs make pain/numbness worse on right buttocks and he has to lean to left side.       ROS:    · General: negative for chills, fever, weight changes or malaise  · Respiratory: no cough, shortness of breath, or wheezing  · Cardiovascular: no chest pain, palpitations, or dyspnea on exertion  · Gastrointestinal: no GERD or history of PUD, abdominal pain, N/V, change in bowel habits, or black or bloody stools  · Musculoskeletal: no muscle weakness  · Neurological: +numbness, tingling in right buttocks/back, headache or dizziness    Past Medical History:   Diagnosis Date    Benign non-nodular prostatic hyperplasia with lower urinary tract symptoms     Beta thalassemia trait     CAD (coronary artery disease)     Cardiomyopathy ([address]     EF 30%    Diabetes mellitus ([address]     Diverticulosis     Elevated PSA     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 Vernell Alvarez     ORTHOPAEDIC  7062'X    wound repair L. arm     Outpatient Prescriptions Marked as Taking for the 2/3/18 encounter (Office Visit) with Sera Cosby, DO   Medication Sig Dispense Refill    amiodarone (CORDARONE) 200 mg tablet Take 200 mg by mouth daily. 5    amLODIPine (NORVASC) 10 mg tablet Take 10 mg by mouth daily. 6    ELIQUIS 5 mg tablet Take 5 mg by mouth two (2) times a day.  aspirin delayed-release 81 mg tablet TAKE 1 TABLET BY MOUTH DAILY  5    atorvastatin (LIPITOR) 40 mg tablet Take 40 mg by mouth daily. 5    carvedilol (COREG) 6.25 mg tablet Take 6.25 mg by mouth two (2) times a day. Take 1 tab twice daily with morning and evening meals  6    isosorbide mononitrate ER (IMDUR) 30 mg tablet Take 30 mg by mouth daily. 3    magnesium oxide (MAG-OX) 400 mg tablet TAKE 1 TABLET BY MOUTH DAILY  1    OLANZapine (ZYPREXA) 2.5 mg tablet Take 2.5 mg by mouth daily. Take 1 tab PO at bedtime  6    KLOR-CON 10 10 mEq tablet TAKE 1 TABLET BY MOUTH DAILY  3    lisinopril (PRINIVIL, ZESTRIL) 40 mg tablet Take 40 mg by mouth daily.  fluticasone (FLOVENT HFA) 44 mcg/actuation inhaler Take  by inhalation. 2 inhalation inhale orally every 12 ours       No Known Allergies    OBJECTIVE:  Visit Vitals    /83 (BP 1 Location: Right arm, BP Patient Position: Sitting)    Pulse (!) 54    Temp 96.9 °F (36.1 °C) (Oral)    Resp 14    Ht 5' 2\" (1.575 m)    Wt 157 lb (71.2 kg)    SpO2 93%    BMI 28.72 kg/m2      GEN: awake, alert, orientated, in no acute distress  CV:  The heart sounds are regular in rate and rhythm. There is a normal S1 and S2. There or no murmurs, rubs, or gallops. Distal pulses are intact and equal. No peripheral edema. Lungs:  Lung sounds are clear and equal to auscultation throughout all lung fields. BACK: upon initial exam noted patient to have bulky wallet in right side back pocket. Minimal tenders on right gluteal area.   NEURO: CNII-XII grossly intact, normal gait, normal sensation    ASSESSMENT/PLAN:   1. Sciatica of right side - no red flags. Suspect wallet on right side provoking sciatic nerve or possible piriformis syndrome. After patient removed wallet he was able to sit straight in chair and actually started feeling a little better. Recommended home back exercises/stretches to perform daily (printed handouts). Recommended OTC epsom salt rub gel to use as needed. Recommend Tylenol as needed for pain. Return if symptoms worsen or do not improve over the next 4-6 weeks. 2. Essential hypertension - slightly elevated today above goal <140/90. Obtaining screening labs today - for liver disease per primary care. Follow-up with Dr. Hue Cardenas as scheduled next month for continued management.

## 2018-02-04 LAB — HBV CORE AB SERPL QL IA: NEGATIVE

## 2018-02-05 LAB
ALBUMIN SERPL ELPH-MCNC: 4 G/DL (ref 2.9–4.4)
ALBUMIN/GLOB SERPL: 1.2 {RATIO} (ref 0.7–1.7)
ALPHA1 GLOB SERPL ELPH-MCNC: 0.3 G/DL (ref 0–0.4)
ALPHA2 GLOB SERPL ELPH-MCNC: 0.8 G/DL (ref 0.4–1)
ANA SER QL: NEGATIVE
B-GLOBULIN SERPL ELPH-MCNC: 1.4 G/DL (ref 0.7–1.3)
GAMMA GLOB SERPL ELPH-MCNC: 0.8 G/DL (ref 0.4–1.8)
GLOBULIN SER CALC-MCNC: 3.4 G/DL (ref 2.2–3.9)
HBV SURFACE AB SER QL IA: NEGATIVE
HBV SURFACE AB SERPL IA-ACNC: <3.1 MIU/ML
HBV SURFACE AG SER QL: <0.1 INDEX
HBV SURFACE AG SER QL: NEGATIVE
HCV AB S/CO SERPL IA: 0.1 S/CO RATIO (ref 0–0.9)
HCV AB SERPL QL IA: NORMAL
HEP BS AB COMMENT,HBSAC: ABNORMAL
M PROTEIN SERPL ELPH-MCNC: ABNORMAL G/DL
PROT SERPL-MCNC: 7.4 G/DL (ref 6–8.5)
SEE BELOW:, 164879: NORMAL

## 2018-03-01 ENCOUNTER — OFFICE VISIT (OUTPATIENT)
Dept: CARDIOLOGY CLINIC | Age: 78
End: 2018-03-01

## 2018-03-01 VITALS
BODY MASS INDEX: 28.16 KG/M2 | HEIGHT: 62 IN | HEART RATE: 74 BPM | SYSTOLIC BLOOD PRESSURE: 150 MMHG | OXYGEN SATURATION: 91 % | WEIGHT: 153 LBS | DIASTOLIC BLOOD PRESSURE: 89 MMHG

## 2018-03-01 DIAGNOSIS — E11.21 TYPE 2 DIABETES MELLITUS WITH NEPHROPATHY (HCC): ICD-10-CM

## 2018-03-01 DIAGNOSIS — E78.5 HYPERLIPIDEMIA, UNSPECIFIED HYPERLIPIDEMIA TYPE: Chronic | ICD-10-CM

## 2018-03-01 DIAGNOSIS — Z79.899 ON AMIODARONE THERAPY: ICD-10-CM

## 2018-03-01 DIAGNOSIS — F20.9 CHRONIC SCHIZOPHRENIA (HCC): ICD-10-CM

## 2018-03-01 DIAGNOSIS — I50.9 CONGESTIVE HEART FAILURE, UNSPECIFIED CONGESTIVE HEART FAILURE CHRONICITY, UNSPECIFIED CONGESTIVE HEART FAILURE TYPE: Chronic | ICD-10-CM

## 2018-03-01 DIAGNOSIS — I48.0 PAROXYSMAL ATRIAL FIBRILLATION (HCC): ICD-10-CM

## 2018-03-01 DIAGNOSIS — I10 ESSENTIAL HYPERTENSION: ICD-10-CM

## 2018-03-01 DIAGNOSIS — N18.9 CHRONIC KIDNEY DISEASE, UNSPECIFIED CKD STAGE: ICD-10-CM

## 2018-03-01 DIAGNOSIS — I51.7 LVH (LEFT VENTRICULAR HYPERTROPHY): Primary | ICD-10-CM

## 2018-03-01 DIAGNOSIS — I25.10 CHRONIC CORONARY ARTERY DISEASE: Chronic | ICD-10-CM

## 2018-03-01 NOTE — MR AVS SNAPSHOT
[address] [facility] [address] [address] 
[phone] Patient: Amarilis Neville MRN: [id number] SCA:3/80/4249 Visit Information Date & Time Provider Department Dept. Phone Encounter #  
 3/1/2018 10:00 AM Yasmine Burger MD [address] Specialist at [facility] [phone] [id number] Follow-up Instructions Return in about 4 weeks (around 3/29/2018). Your Appointments 3/8/2018  2:30 PM  
Office Visit with Kavin Dakin, MD [address] [facility]) Appt Note: 6 week f/u htn, dm, chol  
 [address]  
  
    
 4/4/2018 10:45 AM  
Follow Up with Yasmine Burger MD  
Cardio Specialist at [facility]) Appt Note: 4 weeks [facility] [address]  
  
    
 8/1/2018  3:00 PM  
ULTRASOUND with [facility]OUND [facility] Appt Note: Follow up in 1 year to review the results of his renal ultrasound, repeat a same-day PSA, and PVR. [address] [facility] [address]  
[phone]  
  
   
 [place] [address]  
  
    
 8/1/2018  3:30 PM  
ESTABLISHED PATIENT with Mik Farfan MD  
Urology of [facility]) Appt Note: Return in about 1 year (around 8/1/2018). w/ sd  
 [address], [address] [facility] [address]  
  
   
 [address], [address] [facility] [address] Upcoming Health Maintenance Date Due HEMOGLOBIN A1C Q6M 1940 FOOT EXAM Q1 2/14/1950 EYE EXAM RETINAL OR DILATED Q1 2/14/1950 GLAUCOMA SCREENING Q2Y 2/14/2005 Pneumococcal 65+ High/Highest Risk (1 of 2 - PCV13) 2/14/2005 MEDICARE YEARLY EXAM 2/14/2005 ZOSTER VACCINE AGE 60> 1/25/2019* DTaP/Tdap/Td series (1 - Tdap) 1/25/2019* MICROALBUMIN Q1 1/25/2019 LIPID PANEL Q1 1/25/2019 *Topic was postponed. The date shown is not the original due date. Allergies as of 3/1/2018  Review Complete On: 3/1/2018 By: Maranda Flores RN No Known Allergies Current Immunizations  Never Reviewed Name Date Influenza Vaccine 11/30/2015 12:00 AM  
  
 Not reviewed this visit You Were Diagnosed With   
  
 Codes Comments Hyperlipidemia, unspecified hyperlipidemia type     ICD-10-CM: E78.5 ICD-9-CM: 272.4 Chronic coronary artery disease     ICD-10-CM: I25.10 ICD-9-CM: 414.00 Congestive heart failure, unspecified congestive heart failure chronicity, unspecified congestive heart failure type ([facility]     ICD-10-CM: I50.9 ICD-9-CM: 428.0 Vitals BP Pulse Height(growth percentile) Weight(growth percentile) SpO2 BMI  
 150/89 74 5' 2\" (1.575 m) 153 lb (69.4 kg) 91% 27.98 kg/m2 Smoking Status Former Smoker BMI and BSA Data Body Mass Index Body Surface Area  
 27.98 kg/m 2 1.74 m 2 Preferred Pharmacy Pharmacy Name Phone [facility][id number], [address] [place] [address] Tricia Paige [phone] Your Updated Medication List  
  
   
This list is accurate as of 3/1/18 10:27 AM.  Always use your most recent med list.  
  
  
  
  
 amiodarone 200 mg tablet Commonly known as:  CORDARONE Take 200 mg by mouth daily. amLODIPine 10 mg tablet Commonly known as:  Deborha Cords Take 10 mg by mouth daily. aspirin delayed-release 81 mg tablet TAKE 1 TABLET BY MOUTH DAILY  
  
 atorvastatin 40 mg tablet Commonly known as:  LIPITOR Take 40 mg by mouth daily. carvedilol 6.25 mg tablet Commonly known as:  Talya Locus Take 6.25 mg by mouth two (2) times a day. Take 1 tab twice daily with morning and evening meals ELIQUIS 5 mg tablet Generic drug:  apixaban Take 5 mg by mouth two (2) times a day. FLOVENT HFA 44 mcg/actuation inhaler Generic drug:  fluticasone Take  by inhalation. 2 inhalation inhale orally every 12 ours  
  
 isosorbide mononitrate ER 30 mg tablet Commonly known as:  IMDUR Take 30 mg by mouth daily. KLOR-CON 10 10 mEq tablet Generic drug:  potassium chloride SR  
TAKE 1 TABLET BY MOUTH DAILY  
  
 lisinopril 40 mg tablet Commonly known as:  Brittny Rindge Take 40 mg by mouth daily. magnesium oxide 400 mg tablet Commonly known as:  MAG-OX  
TAKE 1 TABLET BY MOUTH DAILY  
  
 OLANZapine 2.5 mg tablet Commonly known as:  ZyPREXA Take 2.5 mg by mouth daily. Take 1 tab PO at bedtime Follow-up Instructions Return in about 4 weeks (around 3/29/2018). Patient Instructions Decrease Amidarone to 1/2 tab daily Introducing [facility] SERVICES! Kailee Pizarro introduces [employer] patient portal. Now you can access parts of your medical record, email your doctor's office, and request medication refills online. 1. In your internet browser, go to [email or web address] 2. Click on the First Time User? Click Here link in the Sign In box. You will see the New Member Sign Up page. 3. Enter your [employer] Access Code exactly as it appears below. You will not need to use this code after youve completed the sign-up process. If you do not sign up before the expiration date, you must request a new code. · [employer] Access Code: [email or web address] Expires: 4/25/2018 12:40 PM 
 
4. Enter the last four digits of your Social Security Number (xxxx) and Date of Birth (mm/dd/yyyy) as indicated and click Submit. You will be taken to the next sign-up page. 5. Create a [employer] ID. This will be your [employer] login ID and cannot be changed, so think of one that is secure and easy to remember. 6. Create a [employer] password. You can change your password at any time. 7. Enter your Password Reset Question and Answer. This can be used at a later time if you forget your password. 8. Enter your e-mail address. You will receive e-mail notification when new information is available in [address] 9. Click Sign Up. You can now view and download portions of your medical record. 10. Click the Download Summary menu link to download a portable copy of your medical information. If you have questions, please visit the Frequently Asked Questions section of the [employer] website. Remember, [employer] is NOT to be used for urgent needs. For medical emergencies, dial 911. Now available from your iPhone and Android! Please provide this summary of care documentation to your next provider. Your primary care clinician is listed as [facility]. If you have any questions after today's visit, please call [phone].

## 2018-03-05 DIAGNOSIS — E78.5 HYPERLIPIDEMIA, UNSPECIFIED HYPERLIPIDEMIA TYPE: Chronic | ICD-10-CM

## 2018-03-05 DIAGNOSIS — I25.10 CHRONIC CORONARY ARTERY DISEASE: Chronic | ICD-10-CM

## 2018-03-05 DIAGNOSIS — I50.9 CONGESTIVE HEART FAILURE, UNSPECIFIED CONGESTIVE HEART FAILURE CHRONICITY, UNSPECIFIED CONGESTIVE HEART FAILURE TYPE: Chronic | ICD-10-CM

## 2018-03-05 RX ORDER — AMIODARONE HYDROCHLORIDE 200 MG/1
100 TABLET ORAL DAILY
Qty: 30 TAB | Refills: 5 | Status: SHIPPED | OUTPATIENT
Start: 2018-03-05 | End: 2020-10-12 | Stop reason: SDUPTHER

## 2018-03-05 RX ORDER — AMIODARONE HYDROCHLORIDE 200 MG/1
100 TABLET ORAL DAILY
Qty: 30 TAB | Refills: 5 | Status: CANCELLED | OUTPATIENT
Start: 2018-03-05

## 2018-03-08 ENCOUNTER — OFFICE VISIT (OUTPATIENT)
Dept: INTERNAL MEDICINE CLINIC | Age: 78
End: 2018-03-08

## 2018-03-08 VITALS
BODY MASS INDEX: 28.74 KG/M2 | RESPIRATION RATE: 14 BRPM | HEIGHT: 62 IN | TEMPERATURE: 96.8 F | SYSTOLIC BLOOD PRESSURE: 142 MMHG | DIASTOLIC BLOOD PRESSURE: 80 MMHG | OXYGEN SATURATION: 93 % | WEIGHT: 156.2 LBS | HEART RATE: 66 BPM

## 2018-03-08 DIAGNOSIS — J18.9 PNEUMONIA DUE TO INFECTIOUS ORGANISM, UNSPECIFIED LATERALITY, UNSPECIFIED PART OF LUNG: ICD-10-CM

## 2018-03-08 DIAGNOSIS — E78.5 HYPERLIPIDEMIA, UNSPECIFIED HYPERLIPIDEMIA TYPE: Chronic | ICD-10-CM

## 2018-03-08 DIAGNOSIS — I50.9 CONGESTIVE HEART FAILURE, UNSPECIFIED CONGESTIVE HEART FAILURE CHRONICITY, UNSPECIFIED CONGESTIVE HEART FAILURE TYPE: ICD-10-CM

## 2018-03-08 DIAGNOSIS — E11.21 TYPE 2 DIABETES MELLITUS WITH NEPHROPATHY (HCC): Primary | ICD-10-CM

## 2018-03-08 DIAGNOSIS — F20.9 CHRONIC SCHIZOPHRENIA (HCC): ICD-10-CM

## 2018-03-08 DIAGNOSIS — R91.1 LUNG NODULE: ICD-10-CM

## 2018-03-08 LAB
GLUCOSE POC: 104 MG/DL
HBA1C MFR BLD HPLC: 5.8 %

## 2018-03-08 NOTE — MR AVS SNAPSHOT
[address] [facility] 
 
 
 [address] [address] 
[phone] Patient: Brenda Rico MRN: [id number] GLZ:8/68/[id number] Visit Information Date & Time Provider Department Dept. Phone Encounter #  
 3/8/2018  2:30 PM [facility]ika [facility] [id number] Follow-up Instructions Return in about 14 weeks (around 6/14/2018) for HTN, DM, cholesterol. Your Appointments 4/4/2018 10:45 AM  
Follow Up with Antonio Schaffer MD  
Cardio Specialist at [facility]) Appt Note: 4 weeks [address] [facility] 2[facility] [address]  
  
   
 [id number] [place] [address] [address]  
  
    
 8/1/2018  3:00 PM  
ULTRASOUND with [facility] ULTRASOUND [facility] of [facility] ([facility]) Appt Note: Follow up in 1 year to review the results of his renal ultrasound, repeat a same-day PSA, and PVR. [address] [facility] [address] [place] [address]  
[phone]  
  
   
 [facility] [address] [address]  
  
    
 8/1/2018  3:30 PM  
ESTABLISHED PATIENT with Zay Prater MD  
Urology of [facility]) Appt Note: Return in about 1 year (around 8/1/2018). w/ sd  
 [address] [facility], [address] [facility] [address]  
  
   
 [address] [facility], [address] [place] [address] Upcoming Health Maintenance Date Due HEMOGLOBIN A1C Q6M 1940 FOOT EXAM Q1 2/14/1950 EYE EXAM RETINAL OR DILATED Q1 2/14/1950 GLAUCOMA SCREENING Q2Y 2/14/2005 Bone Densitometry (Dexa) Screening 2/14/2005 Pneumococcal 65+ High/Highest Risk (1 of 2 - PCV13) 2/14/2005 MEDICARE YEARLY EXAM 2/14/2005 ZOSTER VACCINE AGE 60> 1/25/2019* DTaP/Tdap/Td series (1 - Tdap) 1/25/2019* MICROALBUMIN Q1 1/25/2019 LIPID PANEL Q1 1/25/2019 *Topic was postponed. The date shown is not the original due date. Allergies as of 3/8/2018  Review Complete On: 3/8/2018 By: Anthony Banuelos MD  
 No Known Allergies Current Immunizations  Reviewed on 3/8/2018 Name Date Influenza Vaccine 11/30/2015 12:00 AM  
  
 Reviewed by Shakila Adames LPN on 9/7/1810 at  1:64 PM  
You Were Diagnosed With   
  
 Codes Comments Type 2 diabetes mellitus with nephropathy ([facility]    -  Primary ICD-10-CM: E11.21 
ICD-9-CM: 250.40, 583.81 Hyperlipidemia, unspecified hyperlipidemia type     ICD-10-CM: E78.5 ICD-9-CM: 272.4 Pneumonia due to infectious organism, unspecified laterality, unspecified part of lung     ICD-10-CM: J18.9 ICD-9-CM: 136.9, 484.8 Lung nodule     ICD-10-CM: R91.1 ICD-9-CM: 793.11 Vitals BP Pulse Temp Resp Height(growth percentile) Weight(growth percentile) 142/80 (BP 1 Location: Left arm, BP Patient Position: Sitting) 66 96.8 °F (36 °C) (Oral) 14 5' 2\" (1.575 m) 156 lb 3.2 oz (70.9 kg) SpO2 BMI Smoking Status 93% 28.57 kg/m2 Former Smoker Vitals History BMI and BSA Data Body Mass Index Body Surface Area 28.57 kg/m 2 1.76 m 2 Preferred Pharmacy Pharmacy Name Phone [facility]Y #[id number]in, [address] [facility] Tricia Smithing [phone] Your Updated Medication List  
  
   
This list is accurate as of 3/8/18  3:57 PM.  Always use your most recent med list.  
  
  
  
  
 amiodarone 200 mg tablet Commonly known as:  CORDARONE Take 0.5 Tabs by mouth daily. amLODIPine 10 mg tablet Commonly known as:  Deborha Cords Take 10 mg by mouth daily. aspirin delayed-release 81 mg tablet TAKE 1 TABLET BY MOUTH DAILY  
  
 atorvastatin 40 mg tablet Commonly known as:  LIPITOR Take 40 mg by mouth daily. carvedilol 6.25 mg tablet Commonly known as:  Talya Locus Take 6.25 mg by mouth two (2) times a day.  Take 1 tab twice daily with morning and evening meals ELIQUIS 5 mg tablet Generic drug:  apixaban Take 5 mg by mouth two (2) times a day. FLOVENT HFA 44 mcg/actuation inhaler Generic drug:  fluticasone Take  by inhalation. 2 inhalation inhale orally every 12 ours  
  
 isosorbide mononitrate ER 30 mg tablet Commonly known as:  IMDUR Take 30 mg by mouth daily. KLOR-CON 10 10 mEq tablet Generic drug:  potassium chloride SR  
TAKE 1 TABLET BY MOUTH DAILY  
  
 lisinopril 40 mg tablet Commonly known as:  Marlyn Fair Take 40 mg by mouth daily. magnesium oxide 400 mg tablet Commonly known as:  MAG-OX  
TAKE 1 TABLET BY MOUTH DAILY  
  
 OLANZapine 2.5 mg tablet Commonly known as:  ZyPREXA Take 2.5 mg by mouth daily. Take 1 tab PO at bedtime We Performed the Following AMB POC GLUCOSE BLOOD, BY GLUCOSE MONITORING DEVICE [00482 CPT(R)] AMB POC HEMOGLOBIN A1C [62684 CPT(R)] Follow-up Instructions Return in about 14 weeks (around 6/14/2018) for HTN, DM, cholesterol. Introducing [facility] & HEALTH SERVICES! Sheila Arteaga introduces [employer] patient portal. Now you can access parts of your medical record, email your doctor's office, and request medication refills online. 1. In your internet browser, go to [email or web address] 2. Click on the First Time User? Click Here link in the Sign In box. You will see the New Member Sign Up page. 3. Enter your [employer] Access Code exactly as it appears below. You will not need to use this code after youve completed the sign-up process. If you do not sign up before the expiration date, you must request a new code. · [employer] Access Code: [email or web address] Expires: 4/25/2018 12:40 PM 
 
4. Enter the last four digits of your Social Security Number (xxxx) and Date of Birth (mm/dd/yyyy) as indicated and click Submit. You will be taken to the next sign-up page. 5. Create a [employer] ID. This will be your [employer] login ID and cannot be changed, so think of one that is secure and easy to remember. 6. Create a [employer] password. You can change your password at any time. 7. Enter your Password Reset Question and Answer. This can be used at a later time if you forget your password. 8. Enter your e-mail address. You will receive e-mail notification when new information is available in [address] 9. Click Sign Up. You can now view and download portions of your medical record. 10. Click the Download Summary menu link to download a portable copy of your medical information. If you have questions, please visit the Frequently Asked Questions section of the [employer] website. Remember, [employer] is NOT to be used for urgent needs. For medical emergencies, dial 911. Now available from your iPhone and Android! Please provide this summary of care documentation to your next provider. Your primary care clinician is listed as [facility]. If you have any questions after today's visit, please call [phone].

## 2018-03-08 NOTE — PROGRESS NOTES
Identified pt with two pt identifiers(name and ). Reviewed record in preparation for visit and have obtained necessary documentation. Chief Complaint   Patient presents with    Hypertension     6 wk f/u    Diabetes    Cholesterol Problem        Health Maintenance Due   Topic    HEMOGLOBIN A1C Q6M     FOOT EXAM Q1     EYE EXAM RETINAL OR DILATED Q1     GLAUCOMA SCREENING Q2Y     Bone Densitometry (Dexa) Screening     Pneumococcal 65+ High/Highest Risk (1 of 2 - PCV13)    HM reviewed w/patient. Depression Screening:  PHQ over the last two weeks 2/3/2018 2018   Little interest or pleasure in doing things Not at all Not at all   Feeling down, depressed or hopeless Not at all Not at all   Total Score PHQ 2 0 0       Learning Assessment:  Learning Assessment 2018   PRIMARY LEARNER Patient   HIGHEST LEVEL OF EDUCATION - PRIMARY LEARNER  DID NOT GRADUATE [facility] PRIMARY LEARNER NONE   CO-LEARNER CAREGIVER No   PRIMARY LANGUAGE ENGLISH   LEARNER PREFERENCE PRIMARY VIDEOS   ANSWERED BY patient   RELATIONSHIP SELF       Abuse Screening:  Abuse Screening Questionnaire 3/8/2018   Do you ever feel afraid of your partner? N   Are you in a relationship with someone who physically or mentally threatens you? N   Is it safe for you to go home? Y       Fall Risk  Fall Risk Assessment, last 12 mths 2/3/2018 2018   Able to walk? Yes Yes   Fall in past 12 months? Yes Yes   Fall with injury? No Yes   Number of falls in past 12 months 1 1   Fall Risk Score 1 2       Coordination of Care Questionnaire:  :   1) Have you been to an emergency room, urgent care clinic since your last visit? no   Hospitalized since your last visit? no             2. Have seen or consulted any other health care provider since your last visit? No  If yes, where when, and reason for visit? 3) Do you have an Advanced Directive/ Living Will in place?  No  If no, would you like information No,stated he will be having papers drawn up with his  soon. Patient is accompanied by self.

## 2018-03-08 NOTE — PROGRESS NOTES
HISTORY OF PRESENT ILLNESS  Adan Fountain is a 66 y.o. male. Visit Vitals    /80 (BP 1 Location: Left arm, BP Patient Position: Sitting)    Pulse 66    Temp 96.8 °F (36 °C) (Oral)    Resp 14    Ht 5' 2\" (1.575 m)    Wt 156 lb 3.2 oz (70.9 kg)    SpO2 93%  Comment: reports 4L 02 @ night    BMI 28.57 kg/m2       HPI Comments: Pt was also in the hospital 2/6-2/17 with pneumonia and respiratory failure. He feels a lot better. Still a slight cough. Using mucinex. He also had a right lung nodule that will need to be evaluated as well in the future. He has since being in the hospital gone back to [facility]) for f/u    Still drives. Also has Handi-Ride? Hypertension    The history is provided by the patient. This is a chronic problem. The current episode started more than 1 week ago. The problem has been gradually improving. Pertinent negatives include no chest pain, no palpitations, no headaches and no dizziness. There are no associated agents to hypertension. Diabetes   The history is provided by the patient. This is a chronic problem. The current episode started more than 1 week ago. The problem occurs daily. The problem has not changed since onset. Pertinent negatives include no chest pain and no headaches. Exacerbated by: diet. The symptoms are relieved by medications (diet). Cholesterol Problem   The history is provided by the patient. This is a chronic problem. The current episode started more than 1 week ago. The problem occurs daily. The problem has not changed since onset. Pertinent negatives include no chest pain and no headaches. Review of Systems   Constitutional: Negative. HENT: Negative for sore throat. Respiratory: Positive for cough and sputum production. But much improved   Cardiovascular: Negative for chest pain and palpitations. Musculoskeletal: Positive for joint pain. Neurological: Negative for dizziness and headaches.        Physical Exam   Constitutional: He is oriented to person, place, and time. He appears well-developed and well-nourished. No distress. Cardiovascular: Normal rate. Pulmonary/Chest: Effort normal and breath sounds normal. No respiratory distress. He has no wheezes. He has no rales. Musculoskeletal: He exhibits edema (1+). Neurological: He is alert and oriented to person, place, and time. Skin: Skin is warm and dry. He is not diaphoretic. Nursing note and vitals reviewed. ASSESSMENT and PLAN    ICD-10-CM ICD-9-CM    1. Type 2 diabetes mellitus with nephropathy (HCC) E11.21 250.40 AMB POC GLUCOSE BLOOD, BY GLUCOSE MONITORING DEVICE     583.81 AMB POC HEMOGLOBIN A1C      REFERRAL TO OPTOMETRY   2. Hyperlipidemia, unspecified hyperlipidemia type E78.5 272.4    3. Pneumonia due to infectious organism, unspecified laterality, unspecified part of lung J18.9 136.9      484.8    4. Lung nodule R91.1 793.11          Available hospital/ER record reviewed    Recovered from pneumonia    BP slightly up but improved    Check HBA1c, glu    Pt is still waiting to get his insurance straightened out--he was told to f/u with Meghan Lerma by his insurance but the doctors may not yet be credentialled.  Will continue to follow here    Pt has seen Dr. Matteo Sam or Jeanette in the past, but did not go back due to the cost. Will refer elsewhere    F/u 3-4 months here

## 2018-03-08 NOTE — PROGRESS NOTES
Results for orders placed or performed in visit on 03/08/18   AMB POC GLUCOSE BLOOD, BY GLUCOSE MONITORING DEVICE   Result Value Ref Range    Glucose  mg/dL   AMB POC HEMOGLOBIN A1C   Result Value Ref Range    Hemoglobin A1c (POC) 5.8 %

## 2018-03-09 NOTE — ASSESSMENT & PLAN NOTE
This condition is managed by Specialist.  Key CAD CHF Meds             amiodarone (CORDARONE) 200 mg tablet  (Taking) Take 0.5 Tabs by mouth daily. amLODIPine (NORVASC) 10 mg tablet  (Taking) Take 10 mg by mouth daily. ELIQUIS 5 mg tablet  (Taking) Take 5 mg by mouth two (2) times a day. aspirin delayed-release 81 mg tablet  (Taking) TAKE 1 TABLET BY MOUTH DAILY    carvedilol (COREG) 6.25 mg tablet  (Taking) Take 6.25 mg by mouth two (2) times a day. Take 1 tab twice daily with morning and evening meals    isosorbide mononitrate ER (IMDUR) 30 mg tablet  (Taking) Take 30 mg by mouth daily. lisinopril (PRINIVIL, ZESTRIL) 40 mg tablet  (Taking) Take 40 mg by mouth daily. Key Antihyperlipidemia Meds             atorvastatin (LIPITOR) 40 mg tablet  (Taking) Take 40 mg by mouth daily.         Lab Results   Component Value Date/Time    Sodium 140 01/25/2018 12:46 PM    Potassium 4.1 01/25/2018 12:46 PM    Cholesterol, total 129 01/25/2018 12:46 PM    HDL Cholesterol 70 01/25/2018 12:46 PM    LDL, calculated 46 01/25/2018 12:46 PM    Triglyceride 65 01/25/2018 12:46 PM

## 2018-03-09 NOTE — ASSESSMENT & PLAN NOTE
This condition is managed by Specialist.  Key Psychotherapeutic Meds             OLANZapine (ZYPREXA) 2.5 mg tablet  (Taking) Take 2.5 mg by mouth daily. Take 1 tab PO at bedtime        Other [address] [place] [address]     The patient is on no other behavioral health meds.         Lab Results   Component Value Date/Time    Sodium 140 01/25/2018 12:46 PM    Creatinine 1.50 01/25/2018 12:46 PM    ALT (SGPT) 134 01/25/2018 12:46 PM    AST (SGOT) 73 01/25/2018 12:46 PM

## 2018-03-17 PROBLEM — I45.10 RBBB: Status: ACTIVE | Noted: 2018-03-17

## 2018-03-17 PROBLEM — Z79.899 ON AMIODARONE THERAPY: Status: ACTIVE | Noted: 2018-03-17

## 2018-03-17 PROBLEM — I51.7 LVH (LEFT VENTRICULAR HYPERTROPHY): Status: ACTIVE | Noted: 2018-03-17

## 2018-03-17 PROBLEM — I51.89 DIASTOLIC DYSFUNCTION: Status: ACTIVE | Noted: 2018-03-17

## 2018-03-17 PROBLEM — I48.0 PAROXYSMAL ATRIAL FIBRILLATION (HCC): Status: ACTIVE | Noted: 2018-03-17

## 2018-03-17 NOTE — PROGRESS NOTES
Subjective:      Kia [place] is seen today for cardiac evaluation. He is a 66 y.o. man with multiple medical problems including coronary artery disease, cardiomyopathy with ejection fraction of 50%, pulmonic stenosis, prior history of endocarditis of the pulmonic valve, paroxysmal atrial fibrillation, pulmonary hypertension, schizophrenia, chronic Amiodarone therapy, hyperlipidemia, diabetes and hypertension. The patient is in the office today for follow up of recent elevated LFTs. The patient is on Amiodarone therapy with starting date not completely clear. He did require a cardioversion back in 2016 which may have been the time he was started on Amiodarone. He is also on statin therapy. In regard to his symptoms, he relates no recent palpitations. He does experience very easily provoked dyspnea. He says he commonly will experience shortness of breath with conversation. Such activities such as walking around the house, dressing and showering do not generally cause any shortness of breath. He does not experienced much peripheral swelling. He believes he had a \"heart attack in 1993 and there are records for a history of coronary disease. \"  Those details of that history of not presently available. He has no PND, orthopnea, recent chest pain.              Patient Active Problem List    Diagnosis Date Noted    LVH (left ventricular hypertrophy) 03/17/2018    On amiodarone therapy 03/17/2018    RBBB 03/17/2018    Paroxysmal atrial fibrillation ([address] 27/77/8759    Diastolic dysfunction 00/76/9126    Pneumonia due to infectious organism 03/08/2018    Lung nodule 03/08/2018    Type 2 diabetes mellitus with nephropathy ([address] 02/03/2018    Type 2 diabetes mellitus without complication, without long-term current use of insulin ([address] 01/25/2018    Hyperlipidemia 01/25/2018    Thalassemia 01/27/2016    Gastroesophageal reflux disease with esophagitis 01/27/2016    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    Swelling of lower extremity 01/27/2016    Hypokalemia 01/27/2016    Visual hallucinations 01/27/2016    Essential hypertension 01/27/2016    Congestive heart failure (HCC) 01/27/2016    Chronic schizophrenia ([facility] 01/27/2016    Chronic kidney disease 01/27/2016    Gout 01/27/2016    Chronic coronary artery disease 01/27/2016    Hematuria 01/27/2016     Current Outpatient Prescriptions   Medication Sig Dispense Refill    amLODIPine (NORVASC) 10 mg tablet Take 10 mg by mouth daily. 6    ELIQUIS 5 mg tablet Take 5 mg by mouth two (2) times a day.  aspirin delayed-release 81 mg tablet TAKE 1 TABLET BY MOUTH DAILY  5    atorvastatin (LIPITOR) 40 mg tablet Take 40 mg by mouth daily. 5    carvedilol (COREG) 6.25 mg tablet Take 6.25 mg by mouth two (2) times a day. Take 1 tab twice daily with morning and evening meals  6    isosorbide mononitrate ER (IMDUR) 30 mg tablet Take 30 mg by mouth daily. 3    magnesium oxide (MAG-OX) 400 mg tablet TAKE 1 TABLET BY MOUTH DAILY  1    OLANZapine (ZYPREXA) 2.5 mg tablet Take 2.5 mg by mouth daily. Take 1 tab PO at bedtime  6    KLOR-CON 10 10 mEq tablet TAKE 1 TABLET BY MOUTH DAILY  3    lisinopril (PRINIVIL, ZESTRIL) 40 mg tablet Take 40 mg by mouth daily.  fluticasone (FLOVENT HFA) 44 mcg/actuation inhaler Take  by inhalation. 2 inhalation inhale orally every 12 ours      amiodarone (CORDARONE) 200 mg tablet Take 0.5 Tabs by mouth daily.  30 Tab 5     No Known Allergies  Past Medical History:   Diagnosis Date    Benign non-nodular prostatic hyperplasia with lower urinary tract symptoms     Beta thalassemia trait     CAD (coronary artery disease)     Cardiomyopathy (HCC)     EF 30%    Diabetes mellitus ([facility]     Diverticulosis     Elevated PSA     Endocarditis 07/2016    pulmonic valve    GERD (gastroesophageal reflux disease)     Gout     Heart failure (HCC)     Hematuria     Hyperlipidemia     Hypertension     Incomplete emptying of bladder     Other ill-defined conditions(799.89)     gout    PAF (paroxysmal atrial fibrillation) (HCC)     Pulmonary hypertension     Rectal polyp     Right renal mass     Schizophrenia (HCC)     Stopped smoking with greater than 10 pack year history     Weight loss, unintentional      Past Surgical History:   Procedure Laterality Date    HX COLONOSCOPY  04/17/2013    Dr. Prasanth Rutledge    HX ORTHOPAEDIC  2896'O    wound repair L. arm     Family History   Problem Relation Age of Onset    Hypertension Mother     Heart Disease Mother     Hypertension Father     Hypertension Brother      History   Smoking Status    Former Smoker    Packs/day: 0.25    Years: 30.00    Types: Cigarettes   Smokeless Tobacco    Never Used          Review of Systems, additional:  Constitutional: negative  Eyes: negative  Respiratory: positive for dyspnea on exertion  Cardiovascular: positive for dyspnea on exertion  Gastrointestinal: negative  Musculoskeletal:negative  Neurological: negative  Behvioral/Psych: negative  Endocrine: negative  ENT: negative    Objective:     Visit Vitals    /89    Pulse 74    Ht 5' 2\" (1.575 m)    Wt 153 lb (69.4 kg)    SpO2 91%    BMI 27.98 kg/m2     General:  alert, cooperative, no distress   Chest Wall: inspection normal - no chest wall deformities or tenderness, respiratory effort normal   Lung: clear to auscultation bilaterally   Heart:  normal rate and regular rhythm, S1 and S2 normal, systolic murmur 2/6 at lower left sternal border   Abdomen: soft, non-tender. Bowel sounds normal. No masses,  no organomegaly   Extremities: extremities normal, atraumatic, no cyanosis or edema Skin: no rashes   Neuro: alert, oriented, normal speech, no focal findings or movement disorder noted         Assessment/Plan:         ICD-10-CM ICD-9-CM    1. LVH (left ventricular hypertrophy), moderate by recent echo 02/07/2018.  Other findings including severely dilated RV and pulmonary hypertension, moderate pulmonic stenosis I51.7 429.3    2. Hyperlipidemia, unspecified hyperlipidemia type E78.5 272.4    3. Chronic coronary artery disease, nuclear stress test done 02/2016; small apicolateral scar with no significant ischemia. I25.10 414.00    4. Congestive heart failure, unspecified congestive heart failure chronicity, unspecified congestive heart failure type ([facility]) I50.9 428.0    5. Essential hypertension, mild elevation of systolic BP in office today. I10 401.9    6. Type 2 diabetes mellitus with nephropathy ([facility]) E11.21 250.40      583.81    7. Chronic kidney disease, unspecified CKD stage N18.9 585.9    8. Chronic schizophrenia ([facility] F20.9 295.62    9. On amiodarone therapy, reduce dosage to 100 mg daily. RT 4 weeks and will obtain records Z79.899 V58.69    10.  Paroxysmal atrial fibrillation ([facility] S/P cardioversion 1/4/2016, on amiodarone  I48.0 427.31

## 2018-04-04 ENCOUNTER — OFFICE VISIT (OUTPATIENT)
Dept: CARDIOLOGY CLINIC | Age: 78
End: 2018-04-04

## 2018-04-04 VITALS
HEIGHT: 62 IN | DIASTOLIC BLOOD PRESSURE: 86 MMHG | WEIGHT: 158 LBS | OXYGEN SATURATION: 91 % | SYSTOLIC BLOOD PRESSURE: 154 MMHG | BODY MASS INDEX: 29.08 KG/M2 | HEART RATE: 73 BPM

## 2018-04-04 DIAGNOSIS — I42.9 CARDIOMYOPATHY, UNSPECIFIED TYPE (HCC): ICD-10-CM

## 2018-04-04 DIAGNOSIS — I51.7 LVH (LEFT VENTRICULAR HYPERTROPHY): Primary | ICD-10-CM

## 2018-04-04 DIAGNOSIS — Z79.899 ON AMIODARONE THERAPY: ICD-10-CM

## 2018-04-04 NOTE — PROGRESS NOTES
Subjective:      Joy Moore is seen today for cardiac evaluation. He is a 66 y.o. man with multiple medical problems including coronary artery disease, cardiomyopathy with ejection fraction of 50%, pulmonic stenosis, prior history of endocarditis of the pulmonic valve, paroxysmal atrial fibrillation, pulmonary hypertension, schizophrenia, chronic Amiodarone therapy, hyperlipidemia, diabetes and hypertension. The patient was recently seen for follow up of recent elevated LFTs. The patient is on Amiodarone therapy with starting date not completely clear. He did require a cardioversion back in 2016 which may have been the time he was started on Amiodarone. His Amiodarone was decreased to 100 mg daily at last visit. He is also on statin therapy. In regard to his symptoms, he relates no recent palpitations. He has had some chronic dyspnea, which he states has improved. He notes recent treatment for pneumonia in February and also relates that he was put on home oxygen, although he states that it does not help much when he is getting around because it only lasts an hour. He indicates that he has some chronic lower extremity swelling, which he reports is improved. He reports using two pillows at night, without recent change. He believes he had a \"heart attack in 1993 and there are records for a history of coronary disease. \"  Those details of that history of not presently available. He has no PND, orthopnea, recent chest pain.              Patient Active Problem List    Diagnosis Date Noted    LVH (left ventricular hypertrophy) 03/17/2018    On amiodarone therapy 03/17/2018    RBBB 03/17/2018    Paroxysmal atrial fibrillation ([facility] 39/05/6067    Diastolic dysfunction 92/22/0990    Pneumonia due to infectious organism 03/08/2018    Lung nodule 03/08/2018    Type 2 diabetes mellitus with nephropathy ([facility] 02/03/2018    Type 2 diabetes mellitus without complication, without long-term current use of insulin ([facility] 01/25/2018    Hyperlipidemia 01/25/2018    Thalassemia 01/27/2016    Gastroesophageal reflux disease with esophagitis 01/27/2016    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 30 Tab 5    amLODIPine (NORVASC) 10 mg tablet Take 10 mg by mouth daily. 6    ELIQUIS 5 mg tablet Take 5 mg by mouth two (2) times a day.  aspirin delayed-release 81 mg tablet TAKE 1 TABLET BY MOUTH DAILY  5    atorvastatin (LIPITOR) 40 mg tablet Take 40 mg by mouth daily. 5    carvedilol (COREG) 6.25 mg tablet Take 6.25 mg by mouth two (2) times a day. Take 1 tab twice daily with morning and evening meals  6    isosorbide mononitrate ER (IMDUR) 30 mg tablet Take 30 mg by mouth daily. 3    magnesium oxide (MAG-OX) 400 mg tablet TAKE 1 TABLET BY MOUTH DAILY  1    OLANZapine (ZYPREXA) 2.5 mg tablet Take 2.5 mg by mouth daily. Take 1 tab PO at bedtime  6    KLOR-CON 10 10 mEq tablet TAKE 1 TABLET BY MOUTH DAILY  3    lisinopril (PRINIVIL, ZESTRIL) 40 mg tablet Take 40 mg by mouth daily.  fluticasone (FLOVENT HFA) 44 mcg/actuation inhaler Take  by inhalation.  2 inhalation inhale orally every 12 ours       No Known Allergies  Past Medical History:   Diagnosis Date    Benign non-nodular prostatic hyperplasia with lower urinary tract symptoms     Beta thalassemia trait     CAD (coronary artery disease)     Cardiomyopathy (HCC)     EF 30%    Diabetes mellitus (HCC)     Diverticulosis     Elevated PSA  Endocarditis 07/2016    pulmonic valve    GERD (gastroesophageal reflux disease)     Gout     Heart failure (HCC)     Hematuria     Hyperlipidemia     Hypertension     Incomplete emptying of bladder     Other ill-defined conditions(799.89)     gout    PAF (paroxysmal atrial fibrillation) (HCC)     Pulmonary hypertension (HCC)     Rectal polyp     Right renal mass     Schizophrenia ([facility]     Stopped smoking with greater than 10 pack year history     Weight loss, unintentional      Past Surgical History:   Procedure Laterality Date    HX COLONOSCOPY  04/17/2013    Dr. Herbert Owens    HX ORTHOPAEDIC  3708'J    wound repair L. arm     Family History   Problem Relation Age of Onset    Hypertension Mother     Heart Disease Mother     Hypertension Father     Hypertension Brother      History   Smoking Status    Former Smoker    Packs/day: 0.25    Years: 30.00    Types: Cigarettes   Smokeless Tobacco    Never Used          Review of Systems, additional:  Constitutional: negative  Eyes: negative  Respiratory: positive for dyspnea on exertion - improving  Cardiovascular: positive for dyspnea on exertion - improving  Gastrointestinal: negative  Musculoskeletal:negative  Neurological: negative  Behvioral/Psych: negative  Endocrine: negative  ENT: negative    Objective:     Visit Vitals    /86    Pulse 73    Ht 5' 2\" (1.575 m)    Wt 158 lb (71.7 kg)    SpO2 91%    BMI 28.9 kg/m2     General:  alert, cooperative, no distress   Chest Wall: inspection normal - no chest wall deformities or tenderness, respiratory effort normal   Lung: clear to auscultation bilaterally   Heart:  normal rate and regular rhythm, S1 and S2 normal, systolic murmur 2/6 at lower left sternal border   Abdomen: soft, non-tender.  Bowel sounds normal. No masses,  no organomegaly   Extremities: 1+ pitting edema to lower extremities Skin: no rashes   Neuro: alert, oriented, normal speech, no focal findings or movement disorder noted         Assessment/Plan:         ICD-10-CM ICD-9-CM    1. LVH (left ventricular hypertrophy), moderate by recent echo 02/07/2018. Other findings including severely dilated RV and pulmonary hypertension, moderate pulmonic stenosis I51.7 429.3    2. Hyperlipidemia, unspecified hyperlipidemia type E78.5 272.4    3. Chronic coronary artery disease, nuclear stress test done 02/2016; small apicolateral scar with no significant ischemia. I25.10 414.00    4. Congestive heart failure, unspecified congestive heart failure chronicity, unspecified congestive heart failure type (HCC) I50.9 428.0    5. Essential hypertension, mild elevation of systolic BP in office today, on Lisinopril, Coreg, Imdur, Amlodipine. I10 401.9    6. Type 2 diabetes mellitus with nephropathy ([facility]) E11.21 250.40      583.81    7. Chronic kidney disease, unspecified CKD stage N18.9 585.9    8. Chronic schizophrenia ([facility] F20.9 295.62    9. On amiodarone therapy, taking 100 mg daily since last visit, will check CMP prior to next visit Z79.899 V58.69    10.  Paroxysmal atrial fibrillation ([facility] S/P cardioversion 1/4/2016, on amiodarone  I48.0 427.31

## 2018-04-04 NOTE — PROGRESS NOTES
1. Have you been to the ER, urgent care clinic since your last visit? Hospitalized since your last visit? No    2. Have you seen or consulted any other health care providers outside of the [facility] since your last visit? Include any pap smears or colon screening.  No

## 2018-04-04 NOTE — MR AVS SNAPSHOT
[address] [facility] [address] [address] 
[phone] Patient: Sedrick Nova MRN: [id number] KCS:[birth year]66[id number] Visit Information Date & Time Provider Department Dept. Phone Encounter #  
 4/4/2018  2:00 PM Rahat Shankar PA-C Cardio Specialist at [facility]  Follow-up Instructions Return in about 4 weeks (around 5/2/2018). Your Appointments 4/5/2018  4:00 PM  
Office Visit with MD [address] [facility]) Appt Note: discuss new oxygen orders [address]  
  
    
 5/2/2018 11:45 AM  
Follow Up with Henok Sharpe MD  
Cardio Specialist at [facility]) Appt Note: 4 weeks [facility] [address]  
  
    
 8/1/2018  3:00 PM  
ULTRASOUND with [facility] ULTRASOUND [facility] ([facility]) Appt Note: Follow up in 1 year to review the results of his renal ultrasound, repeat a same-day PSA, and PVR. [address] [facility] [address] [facility] [address]  
[phone]  
  
   
 [facility] [address]  
  
    
 8/1/2018  3:30 PM  
ESTABLISHED PATIENT with Cristhian Bosch MD  
[facility]) Appt Note: Return in about 1 year (around 8/1/2018). w/ sd  
 [address], [place] [address]  
  
   
 [address], [address] [facility] [address] Upcoming Health Maintenance Date Due  
 FOOT EXAM Q1 2/14/1950 EYE EXAM RETINAL OR DILATED Q1 2/14/1950 GLAUCOMA SCREENING Q2Y 2/14/2005 Pneumococcal 65+ High/Highest Risk (1 of 2 - PCV13) 2/14/2005 MEDICARE YEARLY EXAM 3/28/2018 ZOSTER VACCINE AGE 60> 1/25/2019* DTaP/Tdap/Td series (1 - Tdap) 1/25/2019* HEMOGLOBIN A1C Q6M 9/8/2018 MICROALBUMIN Q1 1/25/2019 LIPID PANEL Q1 1/25/2019 *Topic was postponed. The date shown is not the original due date. Allergies as of 4/4/2018  Review Complete On: 4/4/2018 By: Jaclyn Vila RN No Known Allergies Current Immunizations  Reviewed on 3/8/2018 Name Date Influenza Vaccine 11/30/2015 12:00 AM  
  
 Not reviewed this visit Vitals BP Pulse Height(growth percentile) Weight(growth percentile) SpO2 BMI  
 154/86 73 5' 2\" (1.575 m) 158 lb (71.7 kg) 91% 28.9 kg/m2 Smoking Status Former Smoker BMI and BSA Data Body Mass Index Body Surface Area  
 28.9 kg/m 2 1.77 m 2 Preferred Pharmacy Pharmacy Name Phone [id number] [place], [address] [facility] [place] [phone] Your Updated Medication List  
  
   
This list is accurate as of 4/4/18  2:40 PM.  Always use your most recent med list.  
  
  
  
  
 amiodarone 200 mg tablet Commonly known as:  CORDARONE Take 0.5 Tabs by mouth daily. amLODIPine 10 mg tablet Commonly known as:  [place] Take 10 mg by mouth daily. aspirin delayed-release 81 mg tablet TAKE 1 TABLET BY MOUTH DAILY  
  
 atorvastatin 40 mg tablet Commonly known as:  LIPITOR Take 40 mg by mouth daily. carvedilol 6.25 mg tablet Commonly known as:  Marcos Mood Take 6.25 mg by mouth two (2) times a day. Take 1 tab twice daily with morning and evening meals ELIQUIS 5 mg tablet Generic drug:  apixaban Take 5 mg by mouth two (2) times a day. FLOVENT HFA 44 mcg/actuation inhaler Generic drug:  fluticasone Take  by inhalation. 2 inhalation inhale orally every 12 ours  
  
 isosorbide mononitrate ER 30 mg tablet Commonly known as:  IMDUR Take 30 mg by mouth daily. KLOR-CON 10 10 mEq tablet Generic drug:  potassium chloride SR  
 TAKE 1 TABLET BY MOUTH DAILY  
  
 lisinopril 40 mg tablet Commonly known as:  Tildon Audi Take 40 mg by mouth daily. magnesium oxide 400 mg tablet Commonly known as:  MAG-OX  
TAKE 1 TABLET BY MOUTH DAILY  
  
 OLANZapine 2.5 mg tablet Commonly known as:  ZyPREXA Take 2.5 mg by mouth daily. Take 1 tab PO at bedtime Follow-up Instructions Return in about 4 weeks (around 5/2/2018). Introducing [facility]! Kat Rodriguez introduces [employer] patient portal. Now you can access parts of your medical record, email your doctor's office, and request medication refills online. 1. In your internet browser, go to [email or web address] 2. Click on the First Time User? Click Here link in the Sign In box. You will see the New Member Sign Up page. 3. Enter your [employer] Access Code exactly as it appears below. You will not need to use this code after youve completed the sign-up process. If you do not sign up before the expiration date, you must request a new code. · [employer] Access Code: [email or web address] Expires: 4/25/2018  1:40 PM 
 
4. Enter the last four digits of your Social Security Number (xxxx) and Date of Birth (mm/dd/yyyy) as indicated and click Submit. You will be taken to the next sign-up page. 5. Create a [employer] ID. This will be your [employer] login ID and cannot be changed, so think of one that is secure and easy to remember. 6. Create a [employer] password. You can change your password at any time. 7. Enter your Password Reset Question and Answer. This can be used at a later time if you forget your password. 8. Enter your e-mail address. You will receive e-mail notification when new information is available in [address] 9. Click Sign Up. You can now view and download portions of your medical record. 10. Click the Download Summary menu link to download a portable copy of your medical information. If you have questions, please visit the Frequently Asked Questions section of the [employer] website. Remember, [employer] is NOT to be used for urgent needs. For medical emergencies, dial 911. Now available from your iPhone and Android! Please provide this summary of care documentation to your next provider. Your primary care clinician is listed as [facility]. If you have any questions after today's visit, please call [phone].

## 2018-04-05 ENCOUNTER — HOSPITAL ENCOUNTER (OUTPATIENT)
Dept: LAB | Age: 78
Discharge: HOME | End: 2018-04-05
Payer: COMMERCIAL

## 2018-04-05 ENCOUNTER — OFFICE VISIT (OUTPATIENT)
Dept: INTERNAL MEDICINE CLINIC | Age: 78
End: 2018-04-05

## 2018-04-05 VITALS
OXYGEN SATURATION: 95 % | DIASTOLIC BLOOD PRESSURE: 86 MMHG | SYSTOLIC BLOOD PRESSURE: 152 MMHG | RESPIRATION RATE: 20 BRPM | TEMPERATURE: 96.1 F | HEART RATE: 69 BPM | BODY MASS INDEX: 29.33 KG/M2 | WEIGHT: 159.4 LBS | HEIGHT: 62 IN

## 2018-04-05 DIAGNOSIS — R09.02 HYPOXIA: ICD-10-CM

## 2018-04-05 DIAGNOSIS — I51.7 LVH (LEFT VENTRICULAR HYPERTROPHY): ICD-10-CM

## 2018-04-05 DIAGNOSIS — I51.89 DIASTOLIC DYSFUNCTION: ICD-10-CM

## 2018-04-05 DIAGNOSIS — I50.9 CONGESTIVE HEART FAILURE, UNSPECIFIED HF CHRONICITY, UNSPECIFIED HEART FAILURE TYPE (HCC): ICD-10-CM

## 2018-04-05 DIAGNOSIS — R06.00 DYSPNEA, UNSPECIFIED TYPE: Primary | ICD-10-CM

## 2018-04-05 LAB
ALBUMIN SERPL-MCNC: 4.4 G/DL (ref 3.4–5)
ALBUMIN/GLOB SERPL: 1.2 {RATIO} (ref 0.8–1.7)
ALP SERPL-CCNC: 187 U/L (ref 45–117)
ALT SERPL-CCNC: 102 U/L (ref 16–61)
ANION GAP SERPL CALC-SCNC: 11 MMOL/L (ref 3–18)
AST SERPL-CCNC: 77 U/L (ref 15–37)
BILIRUB SERPL-MCNC: 1.4 MG/DL (ref 0.2–1)
BUN SERPL-MCNC: 19 MG/DL (ref 7–18)
BUN/CREAT SERPL: 13 (ref 12–20)
CALCIUM SERPL-MCNC: 9.1 MG/DL (ref 8.5–10.1)
CHLORIDE SERPL-SCNC: 107 MMOL/L (ref 100–108)
CO2 SERPL-SCNC: 23 MMOL/L (ref 21–32)
CREAT SERPL-MCNC: 1.44 MG/DL (ref 0.6–1.3)
GLOBULIN SER CALC-MCNC: 3.6 G/DL (ref 2–4)
GLUCOSE SERPL-MCNC: 57 MG/DL (ref 74–99)
POTASSIUM SERPL-SCNC: 4.8 MMOL/L (ref 3.5–5.5)
PROT SERPL-MCNC: 8 G/DL (ref 6.4–8.2)
SODIUM SERPL-SCNC: 141 MMOL/L (ref 136–145)

## 2018-04-05 PROCEDURE — 36415 COLL VENOUS BLD VENIPUNCTURE: CPT | Performed by: PHYSICIAN ASSISTANT

## 2018-04-05 PROCEDURE — 80053 COMPREHEN METABOLIC PANEL: CPT | Performed by: PHYSICIAN ASSISTANT

## 2018-04-05 NOTE — MR AVS SNAPSHOT
[address] [facility] 
 
 
 [address] [address] 
[phone] Patient: Ashley Arteaga MRN: [id number] UNL:[id number] Visit Information Date & Time Provider Department Dept. Phone Encounter #  
 4/5/2018  4:00 PM Bishop Hernandez [address] [facility] [address] [id number] Follow-up Instructions Return in about 3 months (around 6/20/2018) for combo clinic, HTN, DM. Your Appointments 5/2/2018 11:45 AM  
Follow Up with Juanjo Kenyon MD  
Cardio Specialist at [address]) Appt Note: 4 weeks [id number] [facility] [address] [address] [address]  
  
   
 [address] [facility] [address]  
  
    
 8/1/2018  3:00 PM  
ULTRASOUND with [facility] ULTRASOUND [facility] ([address] [place] [address]) Appt Note: Follow up in 1 year to review the results of his renal ultrasound, repeat a same-day PSA, and PVR. [address] [facility] [address] [facility] [address]  
[phone]  
  
   
 [facility] [address] [address]  
  
    
 8/1/2018  3:30 PM  
ESTABLISHED PATIENT with Karl Lowry MD  
Urology of [address]) Appt Note: Return in about 1 year (around 8/1/2018). w/ sd  
 [address] [facility], [address] [facility] [address]  
  
   
 [address] [facility], [address] [place] [id number] Upcoming Health Maintenance Date Due  
 FOOT EXAM Q1 2[birth year]14/1950 EYE EXAM RETINAL OR DILATED Q1 2/14/1950 GLAUCOMA SCREENING Q2Y 2/14/2005 Pneumococcal 65+ High/Highest Risk (1 of 2 - PCV13) 2/14/2005 MEDICARE YEARLY EXAM 3/28/2018 ZOSTER VACCINE AGE 60> 1/25/2019* DTaP/Tdap/Td series (1 - Tdap) 1/25/2019* HEMOGLOBIN A1C Q6M 9/8/2018 MICROALBUMIN Q1 1/25/2019 LIPID PANEL Q1 1/25/2019 *Topic was postponed. The date shown is not the original due date. Allergies as of 4/5/2018  Review Complete On: 4/5/2018 By: Maria Isabel An MD  
 No Known Allergies Current Immunizations  Reviewed on 3/8/2018 Name Date Influenza Vaccine 11/30/2015 12:00 AM  
  
 Not reviewed this visit You Were Diagnosed With   
  
 Codes Comments Dyspnea, unspecified type    -  Primary ICD-10-CM: R06.00 
ICD-9-CM: 786.09 Hypoxia     ICD-10-CM: R09.02 
ICD-9-CM: 799.02 Diastolic dysfunction     [facility]--EH: I51.9 ICD-9-CM: 429.9 Congestive heart failure, unspecified HF chronicity, unspecified heart failure type ([facility]     ICD-10-CM: I50.9 ICD-9-CM: 428.0 Vitals BP Pulse Temp Resp Height(growth percentile) Weight(growth percentile) 152/86 (BP 1 Location: Right arm, BP Patient Position: Sitting) 69 96.1 °F (35.6 °C) (Oral) 20 5' 2\" (1.575 m) 159 lb 6.4 oz (72.3 kg) SpO2 BMI Smoking Status 95% 29.15 kg/m2 Former Smoker Vitals History BMI and BSA Data Body Mass Index Body Surface Area  
 29.15 kg/m 2 1.78 m 2 Preferred Pharmacy Pharmacy Name Phone [id number] Ricky Jenkins [phone] Your Updated Medication List  
  
   
This list is accurate as of 4/5/18  4:59 PM.  Always use your most recent med list.  
  
  
  
  
 amiodarone 200 mg tablet Commonly known as:  CORDARONE Take 0.5 Tabs by mouth daily. amLODIPine 10 mg tablet Commonly known as:  Austen Crow Take 10 mg by mouth daily. aspirin delayed-release 81 mg tablet TAKE 1 TABLET BY MOUTH DAILY  
  
 atorvastatin 40 mg tablet Commonly known as:  LIPITOR Take 40 mg by mouth daily. carvedilol 6.25 mg tablet Commonly known as:  Temi Newton Take 6.25 mg by mouth two (2) times a day. Take 1 tab twice daily with morning and evening meals ELIQUIS 5 mg tablet Generic drug:  apixaban Take 5 mg by mouth two (2) times a day. FLOVENT HFA 44 mcg/actuation inhaler Generic drug:  fluticasone Take  by inhalation. 2 inhalation inhale orally every 12 ours  
  
 isosorbide mononitrate ER 30 mg tablet Commonly known as:  IMDUR Take 30 mg by mouth daily. KLOR-CON 10 10 mEq tablet Generic drug:  potassium chloride SR  
TAKE 1 TABLET BY MOUTH DAILY  
  
 lisinopril 40 mg tablet Commonly known as:  Eliza Drought Take 40 mg by mouth daily. magnesium oxide 400 mg tablet Commonly known as:  MAG-OX  
TAKE 1 TABLET BY MOUTH DAILY  
  
 OLANZapine 2.5 mg tablet Commonly known as:  ZyPREXA Take 2.5 mg by mouth daily. Take 1 tab PO at bedtime Follow-up Instructions Return in about 3 months (around 6/20/2018) for combo clinic, HTN, DM. Introducing [facility]! Danish Braun introduces Taggstr patient portal. Now you can access parts of your medical record, email your doctor's office, and request medication refills online. 1. In your internet browser, go to [email or web address] [email or web address] 2. Click on the First Time User? Click Here link in the Sign In box. You will see the New Member Sign Up page. 3. Enter your Taggstr Access Code exactly as it appears below. You will not need to use this code after youve completed the sign-up process. If you do not sign up before the expiration date, you must request a new code. · Taggstr Access Code: [email or web address] Expires: 4/25/2018  1:40 PM 
 
4. Enter the last four digits of your Social Security Number (xxxx) and Date of Birth (mm/dd/yyyy) as indicated and click Submit. You will be taken to the next sign-up page. 5. Create a Taggstr ID. This will be your Taggstr login ID and cannot be changed, so think of one that is secure and easy to remember. 6. Create a Taggstr password. You can change your password at any time. 7. Enter your Password Reset Question and Answer. This can be used at a later time if you forget your password. 8. Enter your e-mail address.  You will receive e-mail notification when new information is available in Zapcoder. 9. Click Sign Up. You can now view and download portions of your medical record. 10. Click the Download Summary menu link to download a portable copy of your medical information. If you have questions, please visit the Frequently Asked Questions section of the [employer]apc[employer] website. Remember, Zapcoder is NOT to be used for urgent needs. For medical emergencies, dial 911. Now available from your iPhone and Android! Please provide this summary of care documentation to your next provider. Your primary care clinician is listed as [facility]. If you have any questions after today's visit, please call [phone].

## 2018-04-05 NOTE — PROGRESS NOTES
ROOM # 4  Pt presents today for evaluation of oxygen at home. Pt states the tank he has now is too heavy and is being evaluated for a carry around concentrator. Pt states he went to cardiologist yesterday and they want him to get tests completed to check his liver and kidneys. Pt reports cardiologist told him to get it done at PCP. Román Navarrete presents today for   Chief Complaint   Patient presents with    O2/Oxygen     possible adjustment       Román Navarrete preferred language for health care discussion is english/other. Is someone accompanying this pt? no    Is the patient using any DME equipment during OV? no    Depression Screening:  PHQ over the last two weeks 4/5/2018 3/8/2018 2/3/2018 1/25/2018   Little interest or pleasure in doing things Not at all Not at all Not at all Not at all   Feeling down, depressed or hopeless Not at all Not at all Not at all Not at all   Total Score PHQ 2 0 0 0 0       Learning Assessment:  Learning Assessment 1/25/2018   PRIMARY LEARNER Patient   HIGHEST LEVEL OF EDUCATION - PRIMARY LEARNER  DID NOT GRADUATE [facility] PRIMARY LEARNER NONE   CO-LEARNER CAREGIVER No   PRIMARY LANGUAGE ENGLISH   LEARNER PREFERENCE PRIMARY VIDEOS   ANSWERED BY patient   RELATIONSHIP SELF       Abuse Screening:  Abuse Screening Questionnaire 4/5/2018 3/8/2018   Do you ever feel afraid of your partner? N N   Are you in a relationship with someone who physically or mentally threatens you? N N   Is it safe for you to go home? Y Y       Fall Risk  Fall Risk Assessment, last 12 mths 4/5/2018 3/8/2018 2/3/2018 1/25/2018   Able to walk? Yes Yes Yes Yes   Fall in past 12 months? No No Yes Yes   Fall with injury? - - No Yes   Number of falls in past 12 months - - 1 1   Fall Risk Score - - 1 2       Health Maintenance reviewed and discussed per provider.  Yes    Román Navarrete is due for   Health Maintenance Due   Topic Date Due    FOOT EXAM Q1  02/14/1950    EYE EXAM RETINAL OR DILATED Q1  02/14/1950    GLAUCOMA SCREENING Q2Y  02/14/2005    Pneumococcal 65+ High/Highest Risk (1 of 2 - PCV13) 02/14/2005    MEDICARE YEARLY EXAM  03/28/2018     Please order/place referral if appropriate. Advance Directive:  1. Do you have an advance directive in place? Patient Reply: yes    2. If not, would you like material regarding how to put one in place? Patient Reply: no    Coordination of Care:  1. Have you been to the ER, urgent care clinic since your last visit? Hospitalized since your last visit? ER, pneumonia    2. Have you seen or consulted any other health care providers outside of the [address] [place] [address] since your last visit? Include any pap smears or colon screening. yes      6-Minute Walk  Performed 6-minute walk test per MD request. Pt oxygen level decreased and reached 88% at 4 minutes and 30 seconds. Pt was then asked to stop walking, oxygen was applied. Liters could not be determined due to broken knob on pt oxygen tank. Per pt the oxygen was set at 1L. Oxygen saturation increased to 97% within 1 minute and a half.

## 2018-04-05 NOTE — PROGRESS NOTES
HISTORY OF PRESENT ILLNESS  Anai Holden is a 66 y.o. male. Visit Vitals    /86 (BP 1 Location: Right arm, BP Patient Position: Sitting)    Pulse 69    Temp 96.1 °F (35.6 °C) (Oral)    Resp 20    Ht 5' 2\" (1.575 m)    Wt 159 lb 6.4 oz (72.3 kg)    SpO2 95%    BMI 29.15 kg/m2       HPI Comments: He has an oxygen concentrator at home. He has a portable tank. But it is too heavy and his tank only lasts an hour. He would like a portable concentrator. He had an appointment with Dr. Parth Murray, his prior PCP, but his insurance would not nahid him as he is out of network. He uses 3-4 LPM with exercise and at night    Current supplier if [address]  [phone]  Fax [phone]    Other   The history is provided by the patient. This is a new problem. Associated symptoms include shortness of breath. Pertinent negatives include no chest pain. Review of Systems   Constitutional: Negative. Respiratory: Positive for shortness of breath. Cardiovascular: Negative for chest pain, palpitations and leg swelling. Physical Exam   Constitutional: He is oriented to person, place, and time. He appears well-developed and well-nourished. No distress. Cardiovascular: Normal rate and regular rhythm. Pulmonary/Chest: Effort normal and breath sounds normal.   Musculoskeletal: He exhibits no edema. Neurological: He is alert and oriented to person, place, and time. Skin: Skin is warm and dry. He is not diaphoretic. Psychiatric: He has a normal mood and affect. Nursing note and vitals reviewed. ASSESSMENT and PLAN    ICD-10-CM ICD-9-CM    1. Dyspnea, unspecified type R06.00 786.09    2. Hypoxia R09.02 799.02    3. Diastolic dysfunction O20.9 429.9    4. Congestive heart failure, unspecified HF chronicity, unspecified heart failure type (HCC) I50.9 428.0        Six minute walk test done at 4 and a half minutes oxygen dropped to 88%.  Recovered to 97% with 1 LPM oxygen    F/u 2-3 months for diabetes, Heart dz            We will call the oxygen company to ask what is needed to change his portable tank to a lightweight portable oxygen concentrator that is triggered by breathing in and, if possible, to change the large concentrator to a smaller unit for nighttime use. We will need to see if Medicare will cover both.     Pt is physically unable to carry the current tank for more than a few minutes which is not adequate to perform his daily routine and needs including doctor's appointments    Current supplier if [address]  [phone]  Fax [phone]

## 2018-04-09 ENCOUNTER — TELEPHONE (OUTPATIENT)
Dept: INTERNAL MEDICINE CLINIC | Age: 78
End: 2018-04-09

## 2018-04-09 NOTE — TELEPHONE ENCOUNTER
[facility] contacted due to pt's O2. Pt has been evaluated by Dr. Americo Rosen on getting portable O2 tank changed to portable O2 concentrator. [facility] staff member stated that they do not carry portable concentrators. Please advise.

## 2018-04-09 NOTE — TELEPHONE ENCOUNTER
Could we try calling either [facility] or [address] [place] [address] Patient to see if they can help. How do we switch to them? Can they do what we want?

## 2018-04-23 ENCOUNTER — TELEPHONE (OUTPATIENT)
Dept: INTERNAL MEDICINE CLINIC | Age: 78
End: 2018-04-23

## 2018-04-23 DIAGNOSIS — Z99.81 OXYGEN DEPENDENT: ICD-10-CM

## 2018-04-23 DIAGNOSIS — I27.20 PULMONARY HTN (HCC): Primary | ICD-10-CM

## 2018-04-23 DIAGNOSIS — I50.9 CONGESTIVE HEART FAILURE, UNSPECIFIED HF CHRONICITY, UNSPECIFIED HEART FAILURE TYPE (HCC): ICD-10-CM

## 2018-04-23 NOTE — TELEPHONE ENCOUNTER
Please return call to Arnold Sanchez with [facility] in [address] at [phone] regarding new orders for O2

## 2018-04-26 NOTE — TELEPHONE ENCOUNTER
Ryan Chamorro with [facility] in [address] in reference to pt O2. 2 pt identifiers confirmed. States she needs new order faxed for oxygen. Included in the order needs to be diagnosis code, liter of flow, continuous or prn, whether machine is small, portable or stationary with O2 concentrator. Fax number is [phone]. Please advise.

## 2018-05-03 DIAGNOSIS — E78.5 HYPERLIPIDEMIA, UNSPECIFIED HYPERLIPIDEMIA TYPE: Chronic | ICD-10-CM

## 2018-05-03 DIAGNOSIS — I25.10 CHRONIC CORONARY ARTERY DISEASE: Chronic | ICD-10-CM

## 2018-05-03 RX ORDER — ASPIRIN 81 MG/1
TABLET ORAL
Qty: 60 TAB | Refills: 5 | Status: SHIPPED | OUTPATIENT
Start: 2018-05-03 | End: 2019-04-27 | Stop reason: SDUPTHER

## 2018-05-03 NOTE — TELEPHONE ENCOUNTER
Patient request  Requested Prescriptions     Pending Prescriptions Disp Refills    aspirin delayed-release 81 mg tablet  5

## 2018-05-09 ENCOUNTER — TELEPHONE (OUTPATIENT)
Dept: INTERNAL MEDICINE CLINIC | Age: 78
End: 2018-05-09

## 2018-05-09 NOTE — TELEPHONE ENCOUNTER
Patient called and stated that Jeannine Carranza called him this morning and told him not to come today don't know why?  He was suppose to have a 6 min walk today and sigh papers please call [phone]

## 2018-05-10 ENCOUNTER — OFFICE VISIT (OUTPATIENT)
Dept: INTERNAL MEDICINE CLINIC | Age: 78
End: 2018-05-10

## 2018-05-10 VITALS
TEMPERATURE: 96.8 F | DIASTOLIC BLOOD PRESSURE: 81 MMHG | WEIGHT: 153.4 LBS | RESPIRATION RATE: 18 BRPM | OXYGEN SATURATION: 94 % | HEIGHT: 62 IN | SYSTOLIC BLOOD PRESSURE: 144 MMHG | HEART RATE: 70 BPM | BODY MASS INDEX: 28.23 KG/M2

## 2018-05-10 DIAGNOSIS — R09.02 OXYGEN DESATURATION: ICD-10-CM

## 2018-05-10 DIAGNOSIS — R06.00 DYSPNEA, UNSPECIFIED TYPE: ICD-10-CM

## 2018-05-10 DIAGNOSIS — Z00.00 MEDICARE ANNUAL WELLNESS VISIT, SUBSEQUENT: Primary | ICD-10-CM

## 2018-05-10 DIAGNOSIS — I27.20 PULMONARY HYPERTENSION (HCC): ICD-10-CM

## 2018-05-10 NOTE — PROGRESS NOTES
HISTORY OF PRESENT ILLNESS  Arthur Rodriguez is a 66 y.o. male. Visit Vitals    /81 (BP 1 Location: Right arm, BP Patient Position: Sitting)    Pulse 70    Temp 96.8 °F (36 °C) (Oral)    Resp 18    Ht 5' 2\" (1.575 m)    Wt 153 lb 6.4 oz (69.6 kg)    SpO2 94%    BMI 28.06 kg/m2       HPI Comments: Pt here to discuss his oxygen    We have been trying to get him portable oxygen. He desaturated during his 6 minute walk. So now he needs it all the time. The first company did not provide the service with his insurance. He has an appointment tomorrow with [facility] to discuss this. His current tank is a standard tank and it is too heavy for him to carry around easily. He does try to use the tank when he is walking a lot. He also is supposed to be using it at night    Other   The history is provided by the patient (see comments). Associated symptoms include shortness of breath. Pertinent negatives include no chest pain. Review of Systems   Constitutional: Negative. Respiratory: Positive for shortness of breath. Negative for hemoptysis, sputum production and wheezing. Cardiovascular: Negative for chest pain and palpitations. Neurological: Negative for dizziness. Physical Exam   Constitutional: He is oriented to person, place, and time. He appears well-developed and well-nourished. No distress. Cardiovascular: Normal rate and regular rhythm. Pulmonary/Chest: Effort normal and breath sounds normal.   Musculoskeletal: He exhibits no edema. Neurological: He is alert and oriented to person, place, and time. Skin: Skin is warm and dry. He is not diaphoretic. Psychiatric: He has a normal mood and affect. Nursing note and vitals reviewed. ASSESSMENT and PLAN    ICD-10-CM ICD-9-CM           2. Dyspnea, unspecified type R06.00 786.09    3. Pulmonary hypertension (HCC) I27.20 416.8    4.  Oxygen desaturation R09.02 799.02     with exercise       Pt did have a six minute walk test here that showed desaturation with exercise. He will f/u with [facility] tomorrow. Attempted to explain the delay with his portable oxygen    F/u in July for HTN, DM                The following is a separate encounter visit:    This is the Subsequent Medicare Annual Wellness Exam, performed 12 months or more after the Initial AWV or the last Subsequent AWV    I have reviewed the patient's medical history in detail and updated the computerized patient record. 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 Ventura Fisher    HX ORTHOPAEDIC  5884'G    wound repair L. arm     Current Outpatient Prescriptions   Medication Sig Dispense Refill    aspirin delayed-release 81 mg tablet TAKE 1 TABLET BY MOUTH DAILY 60 Tab 5    amiodarone (CORDARONE) 200 mg tablet Take 0.5 Tabs by mouth daily. 30 Tab 5    amLODIPine (NORVASC) 10 mg tablet Take 10 mg by mouth daily. 6    ELIQUIS 5 mg tablet Take 5 mg by mouth two (2) times a day.  atorvastatin (LIPITOR) 40 mg tablet Take 40 mg by mouth daily. 5    carvedilol (COREG) 6.25 mg tablet Take 6.25 mg by mouth two (2) times a day.  Take 1 tab twice daily with morning and evening meals  6    magnesium oxide (MAG-OX) 400 mg tablet TAKE 1 TABLET BY MOUTH DAILY  1    OLANZapine (ZYPREXA) 2.5 mg tablet Take 2.5 mg by mouth daily. Take 1 tab PO at bedtime  6    KLOR-CON 10 10 mEq tablet TAKE 1 TABLET BY MOUTH DAILY  3    lisinopril (PRINIVIL, ZESTRIL) 40 mg tablet Take 40 mg by mouth daily.  isosorbide mononitrate ER (IMDUR) 30 mg tablet Take 30 mg by mouth daily. 3    fluticasone (FLOVENT HFA) 44 mcg/actuation inhaler Take  by inhalation.  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    Swelling of lower extremity M79.89    Hypokalemia E87.6    Visual hallucinations R44.1    Essential hypertension I10    Congestive heart failure (HCC) I50.9    Chronic schizophrenia ([facility] F20.9    Chronic kidney disease N18.9    Gout M10.9    Chronic coronary artery disease I25.10    Hematuria R31.9    Type 2 diabetes mellitus without complication, without long-term current use of insulin (HCC) E11.9    Hyperlipidemia E78.5    Type 2 diabetes mellitus with nephropathy (HCC) E11.21    Pneumonia due to infectious organism J18.9    Lung nodule R91.1    LVH (left ventricular hypertrophy) I51.7    On amiodarone therapy Z79.899    RBBB I45.10    Paroxysmal atrial fibrillation (HCC) R68.5    Diastolic dysfunction C56.3    Pulmonary hypertension (HCC) I27.20       Depression Risk Factor Screening:     PHQ over the last two weeks 5/10/2018   Little interest or pleasure in doing things Not at all   Feeling down, depressed or hopeless Not at all   Total Score PHQ 2 0     Alcohol Risk Factor Screening: You do not drink alcohol or very rarely. Functional Ability and Level of Safety:   Hearing Loss  Hearing is good. Activities of Daily Living  The home contains: grab bars  Patient does total self care    Fall Risk  Fall Risk Assessment, last 12 mths 5/10/2018   Able to walk? Yes   Fall in past 12 months? No   Fall with injury? -   Number of falls in past 12 months -   Fall Risk Score -       Abuse Screen  Patient is not abused    Cognitive Screening   Evaluation of Cognitive Function:  Has your family/caregiver stated any concerns about your memory: no  Normal, Mini Cog test    Patient Care Team   Patient Care Team:  Dylon Chamorro MD as PCP - General (Internal Medicine)  Rebekah Juarez MD as Consulting Provider (Cardiology)  Ricardo North PA-C (Physician Assistant)    Assessment/Plan   Education and counseling provided:  Are appropriate based on today's review and evaluation    Diagnoses and all orders for this visit:    1.  Medicare annual wellness visit, subsequent        Health Maintenance Due   Topic Date Due    FOOT EXAM Q1  02/14/1950    EYE EXAM RETINAL OR DILATED Q1  02[birth year]14/1950    GLAUCOMA SCREENING Q2Y  02/14/2005    Pneumococcal 65+ High/Highest Risk (1 of 2 - PCV13) 02/14/2005    MEDICARE YEARLY EXAM  03/28/2018

## 2018-05-10 NOTE — MR AVS SNAPSHOT
[address] [facility] 
 
 
 [address] [facility] 83 [address] 
[phone] Patient: Harry Mckinney MRN: [id number]:[id number] Visit Information Date & Time Provider Department Dept. Phone Encounter #  
 5/10/2018 10:00 AM Colby Almaguer MD [facility] [phone] [id number] Follow-up Instructions Return in about 10 weeks (around 7/19/2018) for HTN, DM, cholesterol. Your Appointments 8/1/2018  3:00 PM  
ULTRASOUND with [facility] ULTRASOUND [facility] 98 ([address] [place] [address]) Appt Note: Follow up in 1 year to review the results of his renal ultrasound, repeat a same-day PSA, and PVR. [address] [address]  
[phone]  
  
   
 [facility] 68 [address]  
  
    
 8/1/2018  3:30 PM  
ESTABLISHED PATIENT with Sonal Hallman MD  
Urology of [address]) Appt Note: Return in about 1 year (around 8/1/2018). w/ sd  
 301 [facility], [place] [address] 2201 [facility] [address]  
  
   
 301 [facility], [address] [place] [address] Upcoming Health Maintenance Date Due  
 FOOT EXAM Q1 2[birth year]14/1950 EYE EXAM RETINAL OR DILATED Q1 2[birth year]14/1950 GLAUCOMA SCREENING Q2Y 2/14/2005 Pneumococcal 65+ High/Highest Risk (1 of 2 - PCV13) 2/14/2005 MEDICARE YEARLY EXAM 3/28/2018 ZOSTER VACCINE AGE 60> 1/25/2019* DTaP/Tdap/Td series (1 - Tdap) 1/25/2019* Influenza Age 5 to Adult 8/1/2018 HEMOGLOBIN A1C Q6M 9/8/2018 MICROALBUMIN Q1 1/25/2019 LIPID PANEL Q1 1/25/2019 *Topic was postponed. The date shown is not the original due date. Allergies as of 5/10/2018  Review Complete On: 5/10/2018 By: Colby Almaguer MD  
 No Known Allergies Current Immunizations  Reviewed on 3/8/2018 Name Date Influenza Vaccine 11/30/2015 12:00 AM  
  
 Not reviewed this visit You Were Diagnosed With   
  
 Codes Comments Medicare annual wellness visit, subsequent    -  Primary ICD-10-CM: Z00.00 ICD-9-CM: V70.0 Dyspnea, unspecified type     ICD-10-CM: R06.00 
ICD-9-CM: 786.09   
 Pulmonary hypertension ([address]     ICD-10-CM: I27.20 ICD-9-CM: 416.8 Oxygen desaturation     ICD-10-CM: R09.02 
ICD-9-CM: 799.02 with exercise Vitals BP Pulse Temp Resp Height(growth percentile) Weight(growth percentile) 144/81 (BP 1 Location: Right arm, BP Patient Position: Sitting) 70 96.8 °F (36 °C) (Oral) 18 5' 2\" (1.575 m) 153 lb 6.4 oz (69.6 kg) SpO2 BMI Smoking Status 94% 28.06 kg/m2 Former Smoker Vitals History BMI and BSA Data Body Mass Index Body Surface Area 28.06 kg/m 2 1.74 m 2 Preferred Pharmacy Pharmacy Name Phone [facility]Y #[id number] [address] Syed Choudhury [phone] Your Updated Medication List  
  
   
This list is accurate as of 5/10/18 10:24 AM.  Always use your most recent med list.  
  
  
  
  
 amiodarone 200 mg tablet Commonly known as:  CORDARONE Take 0.5 Tabs by mouth daily. amLODIPine 10 mg tablet Commonly known as:  Clara Luda Take 10 mg by mouth daily. aspirin delayed-release 81 mg tablet TAKE 1 TABLET BY MOUTH DAILY  
  
 atorvastatin 40 mg tablet Commonly known as:  LIPITOR Take 40 mg by mouth daily. carvedilol 6.25 mg tablet Commonly known as:  [place]  Take 6.25 mg by mouth two (2) times a day. Take 1 tab twice daily with morning and evening meals ELIQUIS 5 mg tablet Generic drug:  apixaban Take 5 mg by mouth two (2) times a day. FLOVENT HFA 44 mcg/actuation inhaler Generic drug:  fluticasone Take  by inhalation. 2 inhalation inhale orally every 12 ours  
  
 isosorbide mononitrate ER 30 mg tablet Commonly known as:  IMDUR Take 30 mg by mouth daily. KLOR-CON 10 10 mEq tablet Generic drug:  potassium chloride SR  
 TAKE 1 TABLET BY MOUTH DAILY  
  
 lisinopril 40 mg tablet Commonly known as:  Karn Desanctis Take 40 mg by mouth daily. magnesium oxide 400 mg tablet Commonly known as:  MAG-OX  
TAKE 1 TABLET BY MOUTH DAILY  
  
 OLANZapine 2.5 mg tablet Commonly known as:  ZyPREXA Take 2.5 mg by mouth daily. Take 1 tab PO at bedtime Follow-up Instructions Return in about 10 weeks (around 7/19/2018) for HTN, DM, cholesterol. Patient Instructions Medicare Wellness Visit, Male The best way to live healthy is to have a healthy lifestyle by eating a well-balanced diet, exercising regularly, limiting alcohol and stopping smoking. Regular physical exams and screening tests are another way to keep healthy. Preventive exams provided by your health care provider can find health problems before they become diseases or illnesses. Preventive services including immunizations, screening tests, monitoring and exams can help you take care of your own health. All people over age 72 should have a pneumovax  and and a prevnar shot to prevent pneumonia. These are once in a lifetime unless you and your provider decide differently. All people over 65 should have a yearly flu shot and a tetanus vaccine every 10 years. Screening for diabetes mellitus with a blood sugar test should be done every year. Glaucoma is a disease of the eye due to increased ocular pressure that can lead to blindness and it should be done every year by an eye professional. 
 
Cardiovascular screening tests that check for elevated lipids (fatty part of blood) which can lead to heart disease and strokes should be done every 5 years. Colorectal screening that evaluates for blood or polyps in your colon should be done yearly as a stool test or every five years as a flexible sigmoidoscope or every 10 years as a colonoscopy up to age 76.  
 
Men up to age 76 may need a screening blood test for prostate cancer at certain intervals, depending on their personal and family history. This decision is between the patient and his provider. If you have been a smoker or had family history of abdominal aortic aneurysms, you and your provider may decide to schedule an ultrasound test of your aorta. Hepatitis C screening is also recommended for anyone born between 80 through [address]. A shingles vaccine is also recommended once in a lifetime after age 61. Your Medicare Wellness Exam is recommended annually. Here is a list of your current Health Maintenance items with a due date: 
Health Maintenance Due Topic Date Due  
 Diabetic Foot Care  02/14/1950  Eye Exam  02/14/1950  Glaucoma Screening   02/14/2005  Pneumococcal Vaccine (1 of 2 - PCV13) 02/14/2005 [facility] Annual Well Visit  03/28/2018 Introducing [facility] & HEALTH SERVICES! [facility] introduces [address] [employer] patient portal. Now you can access parts of your medical record, email your doctor's office, and request medication refills online. 1. In your internet browser, go to [email or web address]"[email or web address]. [employer]/"[employer] 2. Click on the First Time User? Click Here link in the Sign In box. You will see the New Member Sign Up page. 3. Enter your [employer] Access Code exactly as it appears below. You will not need to use this code after youve completed the sign-up process. If you do not sign up before the expiration date, you must request a new code. · [employer] Access Code: [email or web address] Expires: 8/8/2018 10:18 AM 
 
4. Enter the last four digits of your Social Security Number (xxxx) and Date of Birth (mm/dd/yyyy) as indicated and click Submit. You will be taken to the next sign-up page. 5. Create a [employer] ID. This will be your [employer] login ID and cannot be changed, so think of one that is secure and easy to remember. 6. Create a [employer] password. You can change your password at any time. 7. Enter your Password Reset Question and Answer.  This can be used at a later time if you forget your password. 8. Enter your e-mail address. You will receive e-mail notification when new information is available in [address] 9. Click Sign Up. You can now view and download portions of your medical record. 10. Click the Download Summary menu link to download a portable copy of your medical information. If you have questions, please visit the Frequently Asked Questions section of the [employer] website. Remember, [employer] is NOT to be used for urgent needs. For medical emergencies, dial 911. Now available from your iPhone and Android! Please provide this summary of care documentation to your next provider. Your primary care clinician is listed as [facility]. If you have any questions after today's visit, please call [phone].

## 2018-05-10 NOTE — PROGRESS NOTES
ROOM # 4    Greg Sanders presents today for   Chief Complaint   Patient presents with    O2/Oxygen       Greg Sanders preferred language for health care discussion is english/other. Is someone accompanying this pt? no    Is the patient using any DME equipment during OV? no    Depression Screening:  PHQ over the last two weeks 5/10/2018 4/5/2018 3/8/2018 2/3/2018 1/25/2018   Little interest or pleasure in doing things Not at all Not at all Not at all Not at all Not at all   Feeling down, depressed or hopeless Not at all Not at all Not at all Not at all Not at all   Total Score PHQ 2 0 0 0 0 0       Learning Assessment:  Learning Assessment 1/25/2018   PRIMARY LEARNER Patient   HIGHEST LEVEL OF EDUCATION - PRIMARY LEARNER  DID NOT GRADUATE [facility] PRIMARY LEARNER NONE   CO-LEARNER CAREGIVER No   PRIMARY LANGUAGE ENGLISH   LEARNER PREFERENCE PRIMARY VIDEOS   ANSWERED BY patient   RELATIONSHIP SELF       Abuse Screening:  Abuse Screening Questionnaire 5/10/2018 4/5/2018 3/8/2018   Do you ever feel afraid of your partner? N N N   Are you in a relationship with someone who physically or mentally threatens you? N N N   Is it safe for you to go home?  Y Y Y       ADL Assessment:  ADL Assessment 5/10/2018   Feeding yourself No Help Needed   Getting from bed to chair No Help Needed   Getting dressed No Help Needed   Bathing or showering No Help Needed   Walk across the room (includes cane/walker) No Help Needed   Using the telphone No Help Needed   Taking your medications No Help Needed   Preparing meals No Help Needed   Managing money (expenses/bills) No Help Needed   Moderately strenuous housework (laundry) No Help Needed   Shopping for personal items (toiletries/medicines) No Help Needed   Shopping for groceries No Help Needed   Driving No Help Needed   Climbing a flight of stairs No Help Needed   Getting to places beyond walking distances No Help Needed       Fall Risk:  Fall Risk Assessment, last 15 mths 5/10/2018 4/5/2018 3/8/2018 2/3/2018 1/25/2018   Able to walk? Yes Yes Yes Yes Yes   Fall in past 12 months? No No No Yes Yes   Fall with injury? - - - No Yes   Number of falls in past 12 months - - - 1 1   Fall Risk Score - - - 1 2       Health Maintenance reviewed and discussed per provider. Yes    Daniel Maddox is due for   Health Maintenance Due   Topic Date Due    FOOT EXAM Q1  02[birth year]14/1950    EYE EXAM RETINAL OR DILATED Q1  02/14/1950    GLAUCOMA SCREENING Q2Y  02/14/2005    Pneumococcal 65+ High/Highest Risk (1 of 2 - PCV13) 02/14/2005    MEDICARE YEARLY EXAM  03/28/2018     Please order/place referral if appropriate. Advance Directive:  1. Do you have an advance directive in place? Patient Reply: no    2. If not, would you like material regarding how to put one in place? Patient Reply: no    Coordination of Care:  1. Have you been to the ER, urgent care clinic since your last visit? Hospitalized since your last visit? no    2. Have you seen or consulted any other health care providers outside of the [address] [place] [address] since your last visit? Include any pap smears or colon screening.  no

## 2018-05-10 NOTE — PATIENT INSTRUCTIONS

## 2018-05-10 NOTE — TELEPHONE ENCOUNTER
Pt [facility] Pt is aware of appt tomorrow.

## 2018-05-10 NOTE — TELEPHONE ENCOUNTER
Spoke with [facility]. The problem is with the particular device he wants. He has extra steps to qualify for the particular device he wants. The  has to approve testing for it and just got to it yesterday. Pt does have an appt tomorrow.

## 2018-07-16 DIAGNOSIS — Z79.899 MEDICATION MANAGEMENT: ICD-10-CM

## 2018-07-16 RX ORDER — LISINOPRIL 40 MG/1
40 TABLET ORAL DAILY
Qty: 90 TAB | Refills: 1 | Status: SHIPPED | OUTPATIENT
Start: 2018-07-16 | End: 2019-01-10 | Stop reason: SDUPTHER

## 2018-07-19 ENCOUNTER — HOSPITAL ENCOUNTER (OUTPATIENT)
Dept: LAB | Age: 78
Discharge: HOME | End: 2018-07-19
Payer: MEDICARE

## 2018-07-19 ENCOUNTER — OFFICE VISIT (OUTPATIENT)
Dept: INTERNAL MEDICINE CLINIC | Age: 78
End: 2018-07-19

## 2018-07-19 VITALS
BODY MASS INDEX: 28.37 KG/M2 | RESPIRATION RATE: 22 BRPM | HEART RATE: 60 BPM | HEIGHT: 62 IN | TEMPERATURE: 95.9 F | OXYGEN SATURATION: 92 % | WEIGHT: 154.2 LBS | SYSTOLIC BLOOD PRESSURE: 155 MMHG | DIASTOLIC BLOOD PRESSURE: 84 MMHG

## 2018-07-19 DIAGNOSIS — E11.21 TYPE 2 DIABETES MELLITUS WITH NEPHROPATHY (HCC): Primary | Chronic | ICD-10-CM

## 2018-07-19 DIAGNOSIS — I10 ESSENTIAL HYPERTENSION: Chronic | ICD-10-CM

## 2018-07-19 DIAGNOSIS — E78.5 HYPERLIPIDEMIA, UNSPECIFIED HYPERLIPIDEMIA TYPE: Chronic | ICD-10-CM

## 2018-07-19 DIAGNOSIS — E11.21 TYPE 2 DIABETES MELLITUS WITH NEPHROPATHY (HCC): Chronic | ICD-10-CM

## 2018-07-19 LAB
ANION GAP SERPL CALC-SCNC: 10 MMOL/L (ref 3–18)
BUN SERPL-MCNC: 17 MG/DL (ref 7–18)
BUN/CREAT SERPL: 12 (ref 12–20)
CALCIUM SERPL-MCNC: 9.2 MG/DL (ref 8.5–10.1)
CHLORIDE SERPL-SCNC: 105 MMOL/L (ref 100–108)
CHOLEST SERPL-MCNC: 129 MG/DL
CO2 SERPL-SCNC: 25 MMOL/L (ref 21–32)
CREAT SERPL-MCNC: 1.47 MG/DL (ref 0.6–1.3)
GLUCOSE SERPL-MCNC: 58 MG/DL (ref 74–99)
HBA1C MFR BLD: 5.7 % (ref 4.2–5.6)
HDLC SERPL-MCNC: 63 MG/DL (ref 40–60)
HDLC SERPL: 2 {RATIO} (ref 0–5)
LDLC SERPL CALC-MCNC: 53.6 MG/DL (ref 0–100)
LIPID PROFILE,FLP: ABNORMAL
POTASSIUM SERPL-SCNC: 4.1 MMOL/L (ref 3.5–5.5)
SODIUM SERPL-SCNC: 140 MMOL/L (ref 136–145)
TRIGL SERPL-MCNC: 62 MG/DL (ref ?–150)
VLDLC SERPL CALC-MCNC: 12.4 MG/DL

## 2018-07-19 PROCEDURE — 80048 BASIC METABOLIC PNL TOTAL CA: CPT | Performed by: INTERNAL MEDICINE

## 2018-07-19 PROCEDURE — 83036 HEMOGLOBIN GLYCOSYLATED A1C: CPT | Performed by: INTERNAL MEDICINE

## 2018-07-19 PROCEDURE — 36415 COLL VENOUS BLD VENIPUNCTURE: CPT | Performed by: INTERNAL MEDICINE

## 2018-07-19 PROCEDURE — 80061 LIPID PANEL: CPT | Performed by: INTERNAL MEDICINE

## 2018-07-19 NOTE — MR AVS SNAPSHOT
Bernabe Villegas 
 
 
 [address] [facility] 83 [address] 
[phone] Patient: Ferny Rey MRN: [id number] SLU:[id number] Visit Information Date & Time Provider Department Dept. Phone Encounter #  
 7[facility]19/2018  1:[phone] [id number] Follow-up Instructions Return in about 4 months (around 11/19/2018) for HTN, DM, cholesterol. Routing History Your Appointments 8/1/2018  3:00 PM  
ULTRASOUND with [facility] ULTRASOUND [facility]rology of [facility] [address] ([facility]) Appt Note: Follow up in 1 year to review the results of his renal ultrasound, repeat a same-day PSA, and PVR. [address] [address]  
  
    
 8/1/2018  3:30 PM  
Nurse Visit with [facility] NURSE [facility] [address] ([facility]) Appt Note: Follow up in 1 year to review the results of his renal ultrasound, repeat a same-day PSA, and PVR.; psa  
 [address] [address]  
  
    
 8/8/2018  9:30 AM  
ESTABLISHED PATIENT with Vikram Herrera MD  
[facility]rology of [facility] ([facility]) Appt Note: Follow up in 1 year to review the results of his renal ultrasound, repeat a same-day PSA, and PVR.; pt aware of date and time change [address], [place] [address] 2201 [facility] [address]  
  
   
 [address], [address] [facility] [address] Upcoming Health Maintenance Date Due  
 FOOT EXAM Q1 2[birth year]14/1950 EYE EXAM RETINAL OR DILATED Q1 2/14/1950 GLAUCOMA SCREENING Q2Y 2/14/2005 Pneumococcal 65+ High/Highest Risk (2 of 2 - PCV13) 6/1/2008 ZOSTER VACCINE AGE 60> 1/25/2019* DTaP/Tdap/Td series (2 - Td) 1/25/2019* Influenza Age 5 to Adult 8/1/2018 HEMOGLOBIN A1C Q6M 9/8/2018 MICROALBUMIN Q1 1/25/2019 LIPID PANEL Q1 1/25/2019 *Topic was postponed. The date shown is not the original due date. Allergies as of 7/19/2018  Review Complete On: 7/19/2018 By: Lisset Jarrett MD  
 No Known Allergies Current Immunizations  Reviewed on 6/23/2018 Name Date Influenza Vaccine 11/30/2015 12:00 AM  
 Pneumococcal Polysaccharide (PPSV-23) 6/1/2007 Tdap 6/1/2006 Not reviewed this visit You Were Diagnosed With   
  
 Codes Comments Type 2 diabetes mellitus with nephropathy ([facility]    -  Primary ICD-10-CM: E11.21 
ICD-9-CM: 250.40, 583.81 Essential hypertension     ICD-10-CM: I10 
ICD-9-CM: 401.9 Hyperlipidemia, unspecified hyperlipidemia type     ICD-10-CM: E78.5 ICD-9-CM: 272.4 Vitals BP Pulse Temp Resp Height(growth percentile) Weight(growth percentile) 155/84 (BP 1 Location: Right arm, BP Patient Position: Sitting) 60 95.9 °F (35.5 °C) (Oral) 22 5' 2\" (1.575 m) 154 lb 3.2 oz (69.9 kg) SpO2 BMI Smoking Status 92% 28.2 kg/m2 Former Smoker Vitals History BMI and BSA Data Body Mass Index Body Surface Area  
 28.2 kg/m 2 1.75 m 2 Preferred Pharmacy Pharmacy Name Phone [facility]/PHARMACY #[id number] Jocelyne Roa [phone] Your Updated Medication List  
  
   
This list is accurate as of 7/19/18  2:32 PM.  Always use your most recent med list.  
  
  
  
  
 amiodarone 200 mg tablet Commonly known as:  CORDARONE Take 0.5 Tabs by mouth daily. amLODIPine 10 mg tablet Commonly known as:  [place] Take 10 mg by mouth daily. aspirin delayed-release 81 mg tablet TAKE 1 TABLET BY MOUTH DAILY  
  
 atorvastatin 40 mg tablet Commonly known as:  LIPITOR Take 40 mg by mouth daily. carvedilol 6.25 mg tablet Commonly known as:  Yaneli Locket  
 Take 6.25 mg by mouth two (2) times a day. Take 1 tab twice daily with morning and evening meals ELIQUIS 5 mg tablet Generic drug:  apixaban Take 5 mg by mouth two (2) times a day. FLOVENT HFA 44 mcg/actuation inhaler Generic drug:  fluticasone Take  by inhalation. 2 inhalation inhale orally every 12 ours  
  
 isosorbide mononitrate ER 30 mg tablet Commonly known as:  IMDUR Take 30 mg by mouth daily. KLOR-CON 10 10 mEq tablet Generic drug:  potassium chloride SR  
TAKE 1 TABLET BY MOUTH DAILY  
  
 lisinopril 40 mg tablet Commonly known as:  Ronalee Ruffing Take 1 Tab by mouth daily. magnesium oxide 400 mg tablet Commonly known as:  MAG-OX  
TAKE 1 TABLET BY MOUTH DAILY  
  
 OLANZapine 2.5 mg tablet Commonly known as:  ZyPREXA Take 2.5 mg by mouth daily. Take 1 tab PO at bedtime We Performed the Following  DIABETES FOOT EXAM [7 Custom] Follow-up Instructions Return in about 4 months (around 11/19/2018) for HTN, DM, cholesterol. To-Do List   
 07/19/2018 Lab:  HEMOGLOBIN A1C W/O EAG   
  
 07/19/2018 Lab:  LIPID PANEL   
  
 07/19/2018 Lab:  METABOLIC PANEL, BASIC Introducing [facility] & HEALTH SERVICES! [facility] introduces [employer] patient portal. Now you can access parts of your medical record, email your doctor's office, and request medication refills online. 1. In your internet browser, go to [email or web address] 2. Click on the First Time User? Click Here link in the Sign In box. You will see the New Member Sign Up page. 3. Enter your [employer] Access Code exactly as it appears below. You will not need to use this code after youve completed the sign-up process. If you do not sign up before the expiration date, you must request a new code. · [employer] Access Code: [email or web address] Expires: 8/8/2018 10:18 AM 
 
4.  Enter the last four digits of your Social Security Number (xxxx) and Date of Birth (mm/dd/yyyy) as indicated and click Submit. You will be taken to the next sign-up page. 5. Create a [address] ID. This will be your [address] login ID and cannot be changed, so think of one that is secure and easy to remember. 6. Create a [address] password. You can change your password at any time. 7. Enter your Password Reset Question and Answer. This can be used at a later time if you forget your password. 8. Enter your e-mail address. You will receive e-mail notification when new information is available in [address] 9. Click Sign Up. You can now view and download portions of your medical record. 10. Click the Download Summary menu link to download a portable copy of your medical information. If you have questions, please visit the Frequently Asked Questions section of the [employer] website. Remember, [address] is NOT to be used for urgent needs. For medical emergencies, dial 911. Now available from your iPhone and Android! Please provide this summary of care documentation to your next provider. Your primary care clinician is listed as [facility]. If you have any questions after today's visit, please call [phone].

## 2018-07-19 NOTE — PROGRESS NOTES
HISTORY OF PRESENT ILLNESS  Antonella Kenyon is a 66 y.o. male. Visit Vitals    /84 (BP 1 Location: Right arm, BP Patient Position: Sitting)    Pulse 60    Temp 95.9 °F (35.5 °C) (Oral)    Resp 22    Ht 5' 2\" (1.575 m)    Wt 154 lb 3.2 oz (69.9 kg)    SpO2 92%    BMI 28.2 kg/m2       HPI Comments: Was int the ER 2 weeks ago with feeling ill and short of breath. They checked him over and found he was mildly dehydrated. He was given some fluids and sent home. He admits he was not drinking as much fluid as usual    He also did not get his smaller portable oxygen tank. Says her \"failed the test.\"  Which sounds like another 6 minute walk        Current Outpatient Prescriptions:  lisinopril (PRINIVIL, ZESTRIL) 40 mg tablet, Take 1 Tab by mouth daily. aspirin delayed-release 81 mg tablet, TAKE 1 TABLET BY MOUTH DAILY  amiodarone (CORDARONE) 200 mg tablet, Take 0.5 Tabs by mouth daily. amLODIPine (NORVASC) 10 mg tablet, Take 10 mg by mouth daily. ELIQUIS 5 mg tablet, Take 5 mg by mouth two (2) times a day. atorvastatin (LIPITOR) 40 mg tablet, Take 40 mg by mouth daily. carvedilol (COREG) 6.25 mg tablet, Take 6.25 mg by mouth two (2) times a day. Take 1 tab twice daily with morning and evening meals  isosorbide mononitrate ER (IMDUR) 30 mg tablet, Take 30 mg by mouth daily. magnesium oxide (MAG-OX) 400 mg tablet, TAKE 1 TABLET BY MOUTH DAILY  OLANZapine (ZYPREXA) 2.5 mg tablet, Take 2.5 mg by mouth daily. Take 1 tab PO at bedtime  KLOR-CON 10 10 mEq tablet, TAKE 1 TABLET BY MOUTH DAILY  fluticasone (FLOVENT HFA) 44 mcg/actuation inhaler, Take  by inhalation. 2 inhalation inhale orally every 12 ours    No current facility-administered medications for this visit. Hypertension    The history is provided by the patient. This is a chronic problem. The current episode started more than 1 week ago. The problem has not changed since onset. Associated symptoms include shortness of breath.  Pertinent negatives include no chest pain and no palpitations. There are no associated agents to hypertension. Risk factors include male gender and a sedentary lifestyle. Diabetes   The history is provided by the patient. This is a chronic problem. The current episode started more than 1 week ago. The problem occurs daily. Associated symptoms include shortness of breath. Pertinent negatives include no chest pain. Exacerbated by: diet. The symptoms are relieved by medications (diet). Review of Systems   Constitutional: Negative for chills and fever. Respiratory: Positive for shortness of breath. Cardiovascular: Negative for chest pain and palpitations. Gastrointestinal:        Gets occasional water brash in mouth which causes cough       Physical Exam   Constitutional: He is oriented to person, place, and time. He appears well-developed and well-nourished. No distress. Cardiovascular: Normal rate and regular rhythm. Pulmonary/Chest: Effort normal and breath sounds normal.   Musculoskeletal: He exhibits no edema. Neurological: He is alert and oriented to person, place, and time. Diabetic foot exam:     Left Foot:   Visual Exam: callous - dry cracked skin on heels     Pulse DP: 2+ (normal)   Filament test: normal sensation    Vibratory sensation: diminished      Right Foot:   Visual Exam: normal    Pulse DP: 2+ (normal)   Filament test: normal sensation    Vibratory sensation: diminished     Skin: Skin is warm and dry. He is not diaphoretic. Psychiatric: He has a normal mood and affect. Nursing note and vitals reviewed. ASSESSMENT and PLAN    ICD-10-CM ICD-9-CM    1. Type 2 diabetes mellitus with nephropathy (HCC) U31.07 403.59 METABOLIC PANEL, BASIC     583.81 HEMOGLOBIN A1C W/O EAG       DIABETES FOOT EXAM   2. Essential hypertension H24 569.1 METABOLIC PANEL, BASIC      LIPID PANEL   3.  Hyperlipidemia, unspecified hyperlipidemia type E78.5 272.4 LIPID PANEL       BP up some today    BS due for recheck    F/u 3-4 months    Pt missed his May 2 cardiology appt.  Will attempt to reschedule

## 2018-07-19 NOTE — Clinical Note
Pt missed last appt. He seems to be confused about it. Can you have your office make him another appt?  Thanks, [employer]

## 2018-07-19 NOTE — PROGRESS NOTES
ROOM # 6    Angela Phipps presents today for   Chief Complaint   Patient presents with    Hypertension    Diabetes    Cholesterol Problem       Angela Phipps preferred language for health care discussion is english/other. Is someone accompanying this pt? no    Is the patient using any DME equipment during [address]? Yes, O2    Depression Screening:  PHQ over the last two weeks 7/19/2018 5/10/2018 4/5/2018 3/8/2018 2/3/2018 1/25/2018   Little interest or pleasure in doing things Not at all Not at all Not at all Not at all Not at all Not at all   Feeling down, depressed, irritable, or hopeless Not at all Not at all Not at all Not at all Not at all Not at all   Total Score PHQ 2 0 0 0 0 0 0       Learning Assessment:  Learning Assessment 1/25/2018   PRIMARY LEARNER Patient   HIGHEST LEVEL OF EDUCATION - PRIMARY LEARNER  DID NOT GRADUATE [facility] PRIMARY LEARNER NONE   CO-LEARNER CAREGIVER No   PRIMARY LANGUAGE ENGLISH   LEARNER PREFERENCE PRIMARY VIDEOS   ANSWERED BY patient   RELATIONSHIP SELF       Abuse Screening:  Abuse Screening Questionnaire 5/10/2018 4/5/2018 3/8/2018   Do you ever feel afraid of your partner? N N N   Are you in a relationship with someone who physically or mentally threatens you? N N N   Is it safe for you to go home? Laura Bucio       Fall Risk  Fall Risk Assessment, last 12 mths 7/19/2018 5/10/2018 4/5/2018 3/8/2018 2/3/2018 1/25/2018   Able to walk? Yes Yes Yes Yes Yes Yes   Fall in past 12 months? No No No No Yes Yes   Fall with injury? - - - - No Yes   Number of falls in past 12 months - - - - 1 1   Fall Risk Score - - - - 1 2       Health Maintenance reviewed and discussed per provider.  Yes    Angela Phipps is due for   Health Maintenance Due   Topic Date Due    FOOT EXAM Q1  02/14/1950    EYE EXAM RETINAL OR DILATED Q1  02/14/1950    GLAUCOMA SCREENING Q2Y  02/14/2005    Pneumococcal 65+ High/Highest Risk (2 of 2 - PCV13) 06/01/2008     Please order/place referral if appropriate. Advance Directive:  1. Do you have an advance directive in place? Patient Reply: no    2. If not, would you like material regarding how to put one in place? Patient Reply: no    Coordination of Care:  1. Have you been to the ER, urgent care clinic since your last visit? Hospitalized since your last visit? no    2. Have you seen or consulted any other health care providers outside of the [facility] since your last visit? Include any pap smears or colon screening.  no

## 2018-07-20 ENCOUNTER — TELEPHONE (OUTPATIENT)
Dept: CARDIOLOGY CLINIC | Age: 78
End: 2018-07-20

## 2018-07-20 NOTE — TELEPHONE ENCOUNTER
Attempted to contact pt at  number, no answer. Lvm for pt to return call to office at [phone]. Will continue to try to contact pt.        Re: PCP request a need for f/u

## 2018-07-23 NOTE — TELEPHONE ENCOUNTER
Contacted pt at [facility] number. Two patient Identifiers confirmed. Advised pt per noted below.   Pt verbalized understanding and scheduled for  Monday, August 20, 2018 02:30 PM.

## 2018-07-25 ENCOUNTER — TELEPHONE (OUTPATIENT)
Dept: INTERNAL MEDICINE CLINIC | Age: 78
End: 2018-07-25

## 2018-07-25 NOTE — TELEPHONE ENCOUNTER
Contacted Dr. Theo Mckeon office. Gave 2 pt identifiers, staff states that this is not a pt at their office. Verbalized understanding. No further questions or concerns at this time. Verbally notified PCP.

## 2018-07-25 NOTE — TELEPHONE ENCOUNTER
----- Message from Nimo Foster MD sent at 7/19/2018  2:26 PM EDT -----  Regarding: eye exam  Please contact patient's eye doctor for last eye exam notes.   Dr. Kitty Carroll office

## 2018-08-20 ENCOUNTER — OFFICE VISIT (OUTPATIENT)
Dept: CARDIOLOGY CLINIC | Age: 78
End: 2018-08-20

## 2018-08-20 VITALS
BODY MASS INDEX: 29.26 KG/M2 | HEART RATE: 70 BPM | SYSTOLIC BLOOD PRESSURE: 179 MMHG | OXYGEN SATURATION: 91 % | DIASTOLIC BLOOD PRESSURE: 88 MMHG | HEIGHT: 62 IN | WEIGHT: 159 LBS

## 2018-08-20 DIAGNOSIS — I51.7 LVH (LEFT VENTRICULAR HYPERTROPHY): ICD-10-CM

## 2018-08-20 DIAGNOSIS — I25.10 CHRONIC CORONARY ARTERY DISEASE: Primary | ICD-10-CM

## 2018-08-20 DIAGNOSIS — I10 ESSENTIAL HYPERTENSION: Chronic | ICD-10-CM

## 2018-08-20 DIAGNOSIS — I45.10 RBBB: ICD-10-CM

## 2018-08-20 DIAGNOSIS — I50.42 CHRONIC COMBINED SYSTOLIC AND DIASTOLIC CONGESTIVE HEART FAILURE (HCC): ICD-10-CM

## 2018-08-20 DIAGNOSIS — I48.0 PAROXYSMAL ATRIAL FIBRILLATION (HCC): ICD-10-CM

## 2018-08-20 DIAGNOSIS — Z79.899 ON AMIODARONE THERAPY: ICD-10-CM

## 2018-08-20 DIAGNOSIS — I27.20 PULMONARY HYPERTENSION (HCC): ICD-10-CM

## 2018-08-20 DIAGNOSIS — E11.9 TYPE 2 DIABETES MELLITUS WITHOUT COMPLICATION, WITHOUT LONG-TERM CURRENT USE OF INSULIN (HCC): Chronic | ICD-10-CM

## 2018-08-20 NOTE — PROGRESS NOTES
1. Have you been to the ER, urgent care clinic since your last visit? Hospitalized since your last visit? Yes, pneumonia     2. Have you seen or consulted any other health care providers outside of the [address] [place] [address] since your last visit? Include any pap smears or colon screening.  No

## 2018-08-20 NOTE — MR AVS SNAPSHOT
[facility] 
 
 
 [address] [place] [address] [address] [id number] [phone] Patient: Celeste Lorenz MRN: [id number] ZYE:[id number]/[id number] Visit Information Date & Time Provider Department Dept. Phone Encounter #  
 8/20/2018  2:30 PM Barbara Finch MD [address] [place] [address] Specialist at [facility] [phone] [id number] Follow-up Instructions Return in about 4 months (around 12/20/2018). Your Appointments 12/12/2018  1:30 PM  
Follow Up with Barbara Finch MD  
Cardio Specialist at [facility]) Appt Note: f/u in 4 months with Dr Zen Jay  
 [facility] [place] [facility] [address] [facility] [address] 8/12/2019 10:20 AM  
ESTABLISHED PATIENT with EMMA Khan [facility] ([facility]) Appt Note: 1 year f/u  
 [address], [address] [facility] [address]  
  
   
 [address], [address] [address] Upcoming Health Maintenance Date Due  
 EYE EXAM RETINAL OR DILATED Q1 2/14/1950 GLAUCOMA SCREENING Q2Y 2/14/2005 Pneumococcal 65+ High/Highest Risk (2 of 2 - PCV13) 6/1/2008 Influenza Age 5 to Adult 8/1/2018 ZOSTER VACCINE AGE 60> 1/25/2019* DTaP/Tdap/Td series (2 - Td) 1/25/2019* HEMOGLOBIN A1C Q6M 1/19/2019 MICROALBUMIN Q1 2/23/2019 FOOT EXAM Q1 7/19/2019 LIPID PANEL Q1 7/19/2019 *Topic was postponed. The date shown is not the original due date. Allergies as of 8/20/2018  Review Complete On: 8/8/2018 By: EMMA Khan No Known Allergies Current Immunizations  Reviewed on 6/23/2018 Name Date Influenza Vaccine 11/30/2015 12:00 AM  
 Pneumococcal Polysaccharide (PPSV-23) 6/1/2007 Tdap 6/1/2006 Not reviewed this visit Vitals BP Pulse Height(growth percentile) Weight(growth percentile) SpO2 BMI 179/88 70 5' 2\" (1.575 m) 159 lb (72.1 kg) 91% 29.08 kg/m2 Smoking Status Former Smoker BMI and BSA Data Body Mass Index Body Surface Area 29.08 kg/m 2 1.78 m 2 Preferred Pharmacy Pharmacy Name Phone [id number] [id number] [phone] Your Updated Medication List  
  
   
This list is accurate as of 8/20/18  2:46 PM.  Always use your most recent med list.  
  
  
  
  
 amiodarone 200 mg tablet Commonly known as:  CORDARONE Take 0.5 Tabs by mouth daily. amLODIPine 10 mg tablet Commonly known as:  Voncile [place] Take 10 mg by mouth daily. aspirin delayed-release 81 mg tablet TAKE 1 TABLET BY MOUTH DAILY  
  
 atorvastatin 40 mg tablet Commonly known as:  LIPITOR Take 40 mg by mouth daily. carvedilol 6.25 mg tablet Commonly known as:  Media Bliss Take 6.25 mg by mouth two (2) times a day. Take 1 tab twice daily with morning and evening meals ELIQUIS 5 mg tablet Generic drug:  apixaban Take 5 mg by mouth two (2) times a day. FLOVENT HFA 44 mcg/actuation inhaler Generic drug:  fluticasone Take  by inhalation. 2 inhalation inhale orally every 12 ours  
  
 isosorbide mononitrate ER 30 mg tablet Commonly known as:  IMDUR Take 30 mg by mouth daily. KLOR-CON 10 10 mEq tablet Generic drug:  potassium chloride SR  
TAKE 1 TABLET BY MOUTH DAILY  
  
 lisinopril 40 mg tablet Commonly known as:  Braulio Bridges Take 1 Tab by mouth daily. magnesium oxide 400 mg tablet Commonly known as:  MAG-OX  
TAKE 1 TABLET BY MOUTH DAILY  
  
 OLANZapine 2.5 mg tablet Commonly known as:  ZyPREXA Take 2.5 mg by mouth daily. Take 1 tab PO at bedtime Follow-up Instructions Return in about 4 months (around 12/20/2018). Patient Instructions F/u in 4 months with Dr Winston Domínguez Please provide this summary of care documentation to your next provider. Your primary care clinician is listed as [facility]. If you have any questions after today's visit, please call [phone].

## 2018-08-29 NOTE — PROGRESS NOTES
Subjective:      Warden Cabot is seen today for cardiac evaluation. He is a 66 y.o. man with multiple medical problems including coronary artery disease, cardiomyopathy with ejection fraction of 50%, pulmonic stenosis, prior history of endocarditis of the pulmonic valve, paroxysmal atrial fibrillation, pulmonary hypertension, schizophrenia, chronic Amiodarone therapy, hyperlipidemia, diabetes and hypertension. The patient was seen earlier this year for follow up of  elevated LFTs. The patient is on Amiodarone therapy with starting date not completely clear. He did require a cardioversion back in 2016 which may have been the time he was started on Amiodarone. His Amiodarone was decreased to 100 mg daily at last visit. He is also on statin therapy. In the office today, he does report that he has dyspnea with conversation. He usually does not experience dyspnea with physical effort. He has had some minor lower extremity edema. He has had no palpitations. He has had no OND or orthopnea.                 Patient Active Problem List    Diagnosis Date Noted    Pulmonary hypertension ([address]     LVH (left ventricular hypertrophy) 03/17/2018    On amiodarone therapy 03/17/2018    RBBB 03/17/2018    Paroxysmal atrial fibrillation ([address] 30/80/9664    Diastolic dysfunction 14/59/6875    Pneumonia due to infectious organism 03/08/2018    Lung nodule 03/08/2018    Type 2 diabetes mellitus with nephropathy ([address] 02/03/2018    Type 2 diabetes mellitus without complication, without long-term current use of insulin ([address] 01/25/2018    Hyperlipidemia 01/25/2018    Thalassemia 01/27/2016    Gastroesophageal reflux disease with esophagitis 01/27/2016    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 90 Tab 1    aspirin delayed-release 81 mg tablet TAKE 1 TABLET BY MOUTH DAILY 60 Tab 5    amiodarone (CORDARONE) 200 mg tablet Take 0.5 Tabs by mouth daily. 30 Tab 5    amLODIPine (NORVASC) 10 mg tablet Take 10 mg by mouth daily. 6    ELIQUIS 5 mg tablet Take 5 mg by mouth two (2) times a day.  atorvastatin (LIPITOR) 40 mg tablet Take 40 mg by mouth daily. 5    carvedilol (COREG) 6.25 mg tablet Take 6.25 mg by mouth two (2) times a day. Take 1 tab twice daily with morning and evening meals  6    isosorbide mononitrate ER (IMDUR) 30 mg tablet Take 30 mg by mouth daily. 3    magnesium oxide (MAG-OX) 400 mg tablet TAKE 1 TABLET BY MOUTH DAILY  1    OLANZapine (ZYPREXA) 2.5 mg tablet Take 2.5 mg by mouth daily. Take 1 tab PO at bedtime  6    KLOR-CON 10 10 mEq tablet TAKE 1 TABLET BY MOUTH DAILY  3    fluticasone (FLOVENT HFA) 44 mcg/actuation inhaler Take  by inhalation. 2 inhalation inhale orally every 12 ours       No Known Allergies  Past Medical History:   Diagnosis Date    Adrenal myelolipoma     Benign non-nodular prostatic hyperplasia with lower urinary tract symptoms     Beta thalassemia trait     CAD (coronary artery disease)     Cardiomyopathy (HCC)     EF 30%    Diabetes mellitus ([facility]     Diverticulosis     Elevated PSA     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  Faecalis    Enlarged prostate     GERD (gastroesophageal reflux disease)     Gout     Heart failure (HCC)     Hematuria     Hyperlipidemia     Hypertension     Incomplete emptying of bladder     Lacunar infarction (HCC)     left basal ganglia, right cerebellar    Other ill-defined conditions(799.89)     gout    PAF (paroxysmal atrial fibrillation) (HCC)     Pulmonary hypertension ([address]     Rectal polyp     Right renal mass     Schizophrenia ([facility]     Stopped smoking with greater than 10 pack year history     Vitamin D deficiency     Weight loss, unintentional      Past Surgical History:   Procedure Laterality Date    HX COLONOSCOPY  04/17/2013    Dr. Aubrey Jose    [address]    wound repair L. arm    HX TUR[facility]  [facility]/01/2017    [facility]     Family History   Problem Relation Age of Onset    Hypertension Mother     Heart Disease Mother     Hypertension Father     Hypertension Brother      History   Smoking Status    Former Smoker    Packs/day: 0.25    Years: 30.00    Types: Cigarettes   Smokeless Tobacco    Never Used          Review of Systems, additional:  Constitutional: negative  Eyes: negative  Respiratory: positive for dyspnea on exertion - improving  Cardiovascular: positive for dyspnea on exertion - improving  Gastrointestinal: negative  Musculoskeletal:negative  Neurological: negative  Behvioral/Psych: negative  Endocrine: negative  ENT: negative    Objective:     Visit Vitals    /88    Pulse 70    Ht 5' 2\" (1.575 m)    Wt 159 lb (72.1 kg)    SpO2 91%    BMI 29.08 kg/m2     General:  alert, cooperative, no distress   Chest Wall: inspection normal - no chest wall deformities or tenderness, respiratory effort normal   Lung: clear to auscultation bilaterally   Heart:  normal rate and regular rhythm, S1 and S2 normal, systolic murmur 2/6 at lower left sternal border   Abdomen: soft, non-tender. Bowel sounds normal. No masses,  no organomegaly   Extremities: 1+ pitting edema to lower extremities Skin: no rashes   Neuro: alert, oriented, normal speech, no focal findings or movement disorder noted         Assessment/Plan:         ICD-10-CM ICD-9-CM    1. LVH (left ventricular hypertrophy), moderate by recent echo 02/07/2018.  Other findings including severely dilated RV and pulmonary hypertension, moderate pulmonic stenosis I51.7 429.3    2. Hyperlipidemia, unspecified hyperlipidemia type E78.5 272.4    3. Chronic coronary artery disease, nuclear stress test done 02/2016; small apicolateral scar with no significant ischemia. I25.10 414.00    4. Congestive heart failure, unspecified congestive heart failure chronicity, unspecified congestive heart failure type (HCC) RT 4 mos I50.9 428.0    5. Essential hypertension, significant elevation of systolic BP in office today, on Lisinopril, Coreg, Imdur, Amlodipine. No meds today as of yet. I10 401.9    6. Type 2 diabetes mellitus with nephropathy (HCC) E11.21 250.40      583.81    7. Chronic kidney disease, unspecified CKD stage N18.9 585.9    8. Chronic schizophrenia ([facility] F20.9 295.62    9. On amiodarone therapy, taking 100 mg daily since last visit, will check CMP and arrange at next visit Z79.899 V58.69    10.  Paroxysmal atrial fibrillation ([facility] S/P cardioversion 1/4/2016, on amiodarone  I48.0 427.31

## 2018-09-26 DIAGNOSIS — E78.5 HYPERLIPIDEMIA, UNSPECIFIED HYPERLIPIDEMIA TYPE: Chronic | ICD-10-CM

## 2018-09-26 DIAGNOSIS — I25.10 CHRONIC CORONARY ARTERY DISEASE: Chronic | ICD-10-CM

## 2018-09-26 RX ORDER — CARVEDILOL 6.25 MG/1
6.25 TABLET ORAL 2 TIMES DAILY
Qty: 60 TAB | Refills: 5 | Status: SHIPPED | OUTPATIENT
Start: 2018-09-26 | End: 2019-02-18 | Stop reason: SDUPTHER

## 2018-11-09 DIAGNOSIS — Z79.899 MEDICATION MANAGEMENT: ICD-10-CM

## 2018-11-09 DIAGNOSIS — I50.9 CONGESTIVE HEART FAILURE, UNSPECIFIED HF CHRONICITY, UNSPECIFIED HEART FAILURE TYPE (HCC): ICD-10-CM

## 2018-11-09 DIAGNOSIS — I25.10 CHRONIC CORONARY ARTERY DISEASE: Chronic | ICD-10-CM

## 2018-11-09 DIAGNOSIS — E78.5 HYPERLIPIDEMIA, UNSPECIFIED HYPERLIPIDEMIA TYPE: Chronic | ICD-10-CM

## 2018-11-09 RX ORDER — LANOLIN ALCOHOL/MO/W.PET/CERES
400 CREAM (GRAM) TOPICAL DAILY
Qty: 30 TAB | Refills: 1 | Status: SHIPPED | OUTPATIENT
Start: 2018-11-09 | End: 2019-01-08 | Stop reason: SDUPTHER

## 2018-11-09 RX ORDER — ATORVASTATIN CALCIUM 40 MG/1
40 TABLET, FILM COATED ORAL DAILY
Qty: 30 TAB | Refills: 5 | Status: SHIPPED | OUTPATIENT
Start: 2018-11-09 | End: 2019-02-27 | Stop reason: SDUPTHER

## 2018-12-12 ENCOUNTER — OFFICE VISIT (OUTPATIENT)
Dept: CARDIOLOGY CLINIC | Age: 78
End: 2018-12-12

## 2018-12-12 ENCOUNTER — HOSPITAL ENCOUNTER (OUTPATIENT)
Dept: LAB | Age: 78
Discharge: HOME | End: 2018-12-12

## 2018-12-12 VITALS
BODY MASS INDEX: 29.08 KG/M2 | DIASTOLIC BLOOD PRESSURE: 90 MMHG | HEART RATE: 61 BPM | OXYGEN SATURATION: 93 % | SYSTOLIC BLOOD PRESSURE: 149 MMHG | WEIGHT: 158 LBS | HEIGHT: 62 IN

## 2018-12-12 DIAGNOSIS — I48.0 PAROXYSMAL ATRIAL FIBRILLATION (HCC): ICD-10-CM

## 2018-12-12 DIAGNOSIS — I50.42 CHRONIC COMBINED SYSTOLIC AND DIASTOLIC CONGESTIVE HEART FAILURE (HCC): ICD-10-CM

## 2018-12-12 DIAGNOSIS — I50.42 CHRONIC COMBINED SYSTOLIC AND DIASTOLIC CONGESTIVE HEART FAILURE (HCC): Primary | ICD-10-CM

## 2018-12-12 DIAGNOSIS — E11.21 TYPE 2 DIABETES MELLITUS WITH NEPHROPATHY (HCC): Chronic | ICD-10-CM

## 2018-12-12 DIAGNOSIS — I10 ESSENTIAL HYPERTENSION: Chronic | ICD-10-CM

## 2018-12-12 DIAGNOSIS — I37.0 PULMONARY VALVE STENOSIS, UNSPECIFIED ETIOLOGY: ICD-10-CM

## 2018-12-12 DIAGNOSIS — I51.89 DIASTOLIC DYSFUNCTION: ICD-10-CM

## 2018-12-12 DIAGNOSIS — Q25.79 CONGENITAL ANOMALY OF PULMONARY ARTERY: ICD-10-CM

## 2018-12-12 DIAGNOSIS — I51.7 LVH (LEFT VENTRICULAR HYPERTROPHY): ICD-10-CM

## 2018-12-12 DIAGNOSIS — E78.5 HYPERLIPIDEMIA, UNSPECIFIED HYPERLIPIDEMIA TYPE: Chronic | ICD-10-CM

## 2018-12-12 DIAGNOSIS — Z79.899 ON AMIODARONE THERAPY: ICD-10-CM

## 2018-12-12 NOTE — PROGRESS NOTES
1. Have you been to the ER, urgent care clinic since your last visit? Hospitalized since your last visit? No     2. Have you seen or consulted any other health care providers outside of the [address] [place] [address] since your last visit? Include any pap smears or colon screening.  No

## 2018-12-12 NOTE — PATIENT INSTRUCTIONS
LFT ordered -Labs drawn in [facility] [address]. You will have to register in the main lobby before your labs can be drawn. Their hours of operation are Monday through Friday 7am-5:30pm and Saturday 7am-1pm. If you have and questions regarding directions please contact them at [phone].

## 2018-12-13 ENCOUNTER — HOSPITAL ENCOUNTER (OUTPATIENT)
Dept: LAB | Age: 78
Discharge: HOME | End: 2018-12-13
Payer: MEDICARE

## 2018-12-13 LAB
ALBUMIN SERPL-MCNC: 4.3 G/DL (ref 3.4–5)
ALBUMIN/GLOB SERPL: 1.1 {RATIO} (ref 0.8–1.7)
ALP SERPL-CCNC: 182 U/L (ref 45–117)
ALT SERPL-CCNC: 71 U/L (ref 16–61)
AST SERPL-CCNC: 57 U/L (ref 15–37)
BILIRUB DIRECT SERPL-MCNC: 0.4 MG/DL (ref 0–0.2)
BILIRUB SERPL-MCNC: 1.7 MG/DL (ref 0.2–1)
GLOBULIN SER CALC-MCNC: 3.9 G/DL (ref 2–4)
PROT SERPL-MCNC: 8.2 G/DL (ref 6.4–8.2)

## 2018-12-13 PROCEDURE — 36415 COLL VENOUS BLD VENIPUNCTURE: CPT

## 2018-12-13 PROCEDURE — 80076 HEPATIC FUNCTION PANEL: CPT

## 2018-12-28 PROBLEM — I37.0 PULMONIC STENOSIS: Status: ACTIVE | Noted: 2018-12-28

## 2018-12-28 NOTE — PROGRESS NOTES
Subjective:      Dillon Miller is seen today for cardiac evaluation. He is a 66 y.o. man with multiple medical problems including coronary artery disease, cardiomyopathy with ejection fraction of 50%, pulmonic stenosis, prior history of endocarditis of the pulmonic valve, paroxysmal atrial fibrillation, pulmonary hypertension, schizophrenia, chronic Amiodarone therapy, hyperlipidemia, diabetes and hypertension. The patient was seen earlier this year for follow up of  elevated LFTs. The patient is on Amiodarone therapy with a starting date which was not completely clear. He did require a cardioversion  in 2016 which may have been the time he was started on Amiodarone. His Amiodarone was decreased to 100 mg daily at last visit. He is also on statin therapy. In the office today, he reports that he feels good. His breathing has been okay. He does experience shortness of breath with long conversations and with reading aloud. He has had no chest pain. Hillary [place] He has had no PND or orthopnea. He has had no peripheral swelling. He denies palpitations.             Patient Active Problem List    Diagnosis Date Noted    Pulmonic stenosis 12/28/2018    Pulmonary hypertension ([address]     LVH (left ventricular hypertrophy) 03/17/2018    On amiodarone therapy 03/17/2018    RBBB 03/17/2018    Paroxysmal atrial fibrillation ([address] 10/87/2363    Diastolic dysfunction 51/02/2791    Pneumonia due to infectious organism 03/08/2018    Lung nodule 03/08/2018    Type 2 diabetes mellitus with nephropathy ([address] 02/03/2018    Type 2 diabetes mellitus without complication, without long-term current use of insulin ([address] 01/25/2018    Hyperlipidemia 01/25/2018    Thalassemia 01/27/2016    Gastroesophageal reflux disease with esophagitis 01/27/2016    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 30 Tab 5    magnesium oxide (MAG-OX) 400 mg tablet Take 1 Tab by mouth daily. 30 Tab 1    carvedilol (COREG) 6.25 mg tablet Take 1 Tab by mouth two (2) times a day. 60 Tab 5    lisinopril (PRINIVIL, ZESTRIL) 40 mg tablet Take 1 Tab by mouth daily. 90 Tab 1    aspirin delayed-release 81 mg tablet TAKE 1 TABLET BY MOUTH DAILY 60 Tab 5    amiodarone (CORDARONE) 200 mg tablet Take 0.5 Tabs by mouth daily. 30 Tab 5    amLODIPine (NORVASC) 10 mg tablet Take 10 mg by mouth daily. 6    ELIQUIS 5 mg tablet Take 5 mg by mouth two (2) times a day.  isosorbide mononitrate ER (IMDUR) 30 mg tablet Take 30 mg by mouth daily. 3    OLANZapine (ZYPREXA) 2.5 mg tablet Take 2.5 mg by mouth daily. Take 1 tab PO at bedtime  6    KLOR-CON 10 10 mEq tablet TAKE 1 TABLET BY MOUTH DAILY  3    fluticasone (FLOVENT HFA) 44 mcg/actuation inhaler Take  by inhalation. 2 inhalation inhale orally every 12 ours       No Known Allergies  Past Medical History:   Diagnosis Date    Adrenal myelolipoma     Benign non-nodular prostatic hyperplasia with lower urinary tract symptoms     Beta thalassemia trait     CAD (coronary artery disease)     Cardiomyopathy (HCC)     EF 30%    Diabetes mellitus ([facility]     Diverticulosis     Elevated PSA     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  Faecalis    Enlarged prostate     GERD (gastroesophageal reflux disease)     Gout     Heart failure (HCC)     Hematuria     Hyperlipidemia     Hypertension     Incomplete emptying of bladder     Lacunar infarction     left basal ganglia, right cerebellar    Other ill-defined conditions(799.89)     gout    PAF (paroxysmal atrial fibrillation) (HCC)     Pulmonary hypertension (HCC)     Rectal polyp     Right renal mass     Schizophrenia ([facility]     Stopped smoking with greater than 10 pack year history     Vitamin D deficiency     Weight loss, unintentional      Past Surgical History:   Procedure Laterality Date    HX COLONOSCOPY  04/17/2013    Dr. Che Mueller    HX ORTHOPAEDIC  4535'G    wound repair L. arm    HX TURP  03/01/2017    [facility]     Family History   Problem Relation Age of Onset    Hypertension Mother     Heart Disease Mother     Hypertension Father     Hypertension Brother      Social History     Tobacco Use   Smoking Status Former Smoker    Packs/day: 0.25    Years: 30.00    Pack years: 7.50    Types: Cigarettes   Smokeless Tobacco Never Used          Review of Systems, additional:  Constitutional: negative  Eyes: negative  Respiratory: positive for dyspnea on exertion - improving  Cardiovascular: positive for dyspnea on exertion - improving  Gastrointestinal: negative  Musculoskeletal:negative  Neurological: negative  Behvioral/Psych: negative  Endocrine: negative  ENT: negative    Objective:     Visit Vitals  /90   Pulse 61   Ht 5' 2\" (1.575 m)   Wt 158 lb (71.7 kg)   SpO2 93%   BMI 28.90 kg/m²     General:  alert, cooperative, no distress   Chest Wall: inspection normal - no chest wall deformities or tenderness, respiratory effort normal   Lung: clear to auscultation bilaterally   Heart:  normal rate and regular rhythm, S1 and S2 normal, systolic murmur 2/6 at the left second intercostal space border   Abdomen: soft, non-tender. Bowel sounds normal. No masses,  no organomegaly   Extremities: 1+ pitting edema to lower extremities Skin: no rashes   Neuro: alert, oriented, normal speech, no focal findings or movement disorder noted         Assessment/Plan:         ICD-10-CM ICD-9-CM    1.  LVH (left ventricular hypertrophy), moderate by recent echo 02/07/2018. Other findings including severely dilated RV and pulmonary hypertension, moderate pulmonic stenosis with last transvalvular velocity in 2016, 3.2 m/s I51.7 429.3    2. Hyperlipidemia, unspecified hyperlipidemia type E78.5 272.4    3. Chronic coronary artery disease, nuclear stress test done 02/2016; small apical lateral scar with no significant ischemia. I25.10 414.00    4. Congestive heart failure, unspecified congestive heart failure chronicity, unspecified congestive heart failure type (HCC) RT 6 mos I50.9 428.0    5. Essential hypertension, mild elevation of systolic and diastolic BP in office today. I10 401.9    6. Type 2 diabetes mellitus with nephropathy ([facility]) E11.21 250.40      583.81    7. Chronic kidney disease, unspecified CKD stage N18.9 585.9    8. Chronic schizophrenia ([facility] F20.9 295.62    9. On amiodarone therapy, taking 100 mg daily since last visit, will check LFTs. Return in 6 months Z79.899 V58.69    10.  Paroxysmal atrial fibrillation ([facility] S/P cardioversion 1/4/2016, on amiodarone  I48.0 427.31

## 2019-01-08 DIAGNOSIS — Z79.899 MEDICATION MANAGEMENT: ICD-10-CM

## 2019-01-08 RX ORDER — LANOLIN ALCOHOL/MO/W.PET/CERES
CREAM (GRAM) TOPICAL
Qty: 30 TAB | Refills: 1 | Status: SHIPPED | OUTPATIENT
Start: 2019-01-08 | End: 2019-02-12 | Stop reason: SDUPTHER

## 2019-01-08 RX ORDER — OLANZAPINE 2.5 MG/1
2.5 TABLET ORAL DAILY
Refills: 6 | OUTPATIENT
Start: 2019-01-08

## 2019-01-09 NOTE — TELEPHONE ENCOUNTER
Outgoing call to patient this morning. Two patient identifies confirmed. Patient states his psych doctor moved to [place]. Patient has not established care with a new provider yet. Patient states the providers in the same office as his previous doctor would not take him as a new patient. Patient states he has enough medication to last until Saturday.

## 2019-01-10 DIAGNOSIS — Z79.899 MEDICATION MANAGEMENT: ICD-10-CM

## 2019-01-10 RX ORDER — LISINOPRIL 40 MG/1
40 TABLET ORAL DAILY
Qty: 90 TAB | Refills: 1 | Status: SHIPPED | OUTPATIENT
Start: 2019-01-10 | End: 2019-07-06 | Stop reason: SDUPTHER

## 2019-01-10 NOTE — TELEPHONE ENCOUNTER
Requested Prescriptions     Pending Prescriptions Disp Refills    lisinopril (PRINIVIL, ZESTRIL) 40 mg tablet 90 Tab 1     Sig: Take 1 Tab by mouth daily.

## 2019-01-10 NOTE — TELEPHONE ENCOUNTER
Attempted to contact patient this morning. No answer; left voicemail requesting patient return call to office at [phone].

## 2019-01-11 ENCOUNTER — TELEPHONE (OUTPATIENT)
Dept: INTERNAL MEDICINE CLINIC | Age: 79
End: 2019-01-11

## 2019-01-11 DIAGNOSIS — F20.9 CHRONIC SCHIZOPHRENIA (HCC): Primary | ICD-10-CM

## 2019-01-11 NOTE — TELEPHONE ENCOUNTER
Outgoing call to patient this morning. Two patient identifiers confirmed. Patient states he received a 30 day supply from Dr. Frank Couch.  Patient requesting recommendation for a new psychiatrist.

## 2019-01-17 NOTE — TELEPHONE ENCOUNTER
This is the first I have heard about a referral.  He doesn't actually need one but will place one  There may be a problem as Dr. Emily Reyes is not accepting new patients.  He may have to see the NP.

## 2019-01-17 NOTE — TELEPHONE ENCOUNTER
Patient called to check on status of Psychiatric referral.  Patient states insurance preference is [facility].

## 2019-01-28 ENCOUNTER — TELEPHONE (OUTPATIENT)
Dept: INTERNAL MEDICINE CLINIC | Age: 79
End: 2019-01-28

## 2019-01-28 NOTE — TELEPHONE ENCOUNTER
Incoming call from pt. 2 pt identifiers confirmed. Pt states that he had blood in his stool. Pt states that this happened \"not too long ago\" Pt does not have blood coming from his rectum at this time. He said it seems to be clearing up. Pt mentioned that he is on blood thinners. I informed pt to go to the nearest ER, which pt states will be either [facility] or [facility].

## 2019-02-02 DIAGNOSIS — Z79.899 MEDICATION MANAGEMENT: ICD-10-CM

## 2019-02-02 RX ORDER — ISOSORBIDE MONONITRATE 30 MG/1
TABLET, EXTENDED RELEASE ORAL
Qty: 90 TAB | Refills: 0 | Status: SHIPPED | OUTPATIENT
Start: 2019-02-02 | End: 2019-04-29 | Stop reason: SDUPTHER

## 2019-02-12 DIAGNOSIS — E87.6 HYPOKALEMIA: Primary | ICD-10-CM

## 2019-02-12 DIAGNOSIS — Z79.899 MEDICATION MANAGEMENT: ICD-10-CM

## 2019-02-12 RX ORDER — POTASSIUM CHLORIDE 750 MG/1
10 TABLET, FILM COATED, EXTENDED RELEASE ORAL DAILY
Qty: 90 TAB | Refills: 1 | Status: SHIPPED | OUTPATIENT
Start: 2019-02-12 | End: 2019-08-10 | Stop reason: SDUPTHER

## 2019-02-12 RX ORDER — LANOLIN ALCOHOL/MO/W.PET/CERES
CREAM (GRAM) TOPICAL
Qty: 90 TAB | Refills: 1 | Status: SHIPPED | OUTPATIENT
Start: 2019-02-12 | End: 2019-09-03 | Stop reason: SDUPTHER

## 2019-02-12 NOTE — TELEPHONE ENCOUNTER
Requested Prescriptions     Pending Prescriptions Disp Refills    KLOR-CON 10 10 mEq tablet  3    magnesium oxide (MAG-OX) 400 mg tablet 30 Tab 1

## 2019-02-18 DIAGNOSIS — E78.5 HYPERLIPIDEMIA, UNSPECIFIED HYPERLIPIDEMIA TYPE: Chronic | ICD-10-CM

## 2019-02-18 DIAGNOSIS — I25.10 CHRONIC CORONARY ARTERY DISEASE: Chronic | ICD-10-CM

## 2019-02-18 RX ORDER — CARVEDILOL 6.25 MG/1
6.25 TABLET ORAL 2 TIMES DAILY
Qty: 60 TAB | Refills: 5 | Status: SHIPPED | OUTPATIENT
Start: 2019-02-18 | End: 2019-05-30 | Stop reason: SDUPTHER

## 2019-02-18 NOTE — TELEPHONE ENCOUNTER
Pharmacy requesting 90 day supply (Qty 180)    Requested Prescriptions     Pending Prescriptions Disp Refills    carvedilol (COREG) 6.25 mg tablet 60 Tab 5     Sig: Take 1 Tab by mouth two (2) times a day.

## 2019-02-27 DIAGNOSIS — E78.5 HYPERLIPIDEMIA, UNSPECIFIED HYPERLIPIDEMIA TYPE: Chronic | ICD-10-CM

## 2019-02-27 DIAGNOSIS — I25.10 CHRONIC CORONARY ARTERY DISEASE: Chronic | ICD-10-CM

## 2019-02-27 DIAGNOSIS — I50.9 CONGESTIVE HEART FAILURE, UNSPECIFIED HF CHRONICITY, UNSPECIFIED HEART FAILURE TYPE (HCC): ICD-10-CM

## 2019-02-27 RX ORDER — ATORVASTATIN CALCIUM 40 MG/1
40 TABLET, FILM COATED ORAL DAILY
Qty: 30 TAB | Refills: 5 | Status: SHIPPED | OUTPATIENT
Start: 2019-02-27 | End: 2019-08-26 | Stop reason: SDUPTHER

## 2019-02-27 NOTE — TELEPHONE ENCOUNTER
Requested Prescriptions     Pending Prescriptions Disp Refills    atorvastatin (LIPITOR) 40 mg tablet 30 Tab 5     Sig: Take 1 Tab by mouth daily.

## 2019-04-16 ENCOUNTER — HOSPITAL ENCOUNTER (OUTPATIENT)
Dept: LAB | Age: 79
Discharge: HOME | End: 2019-04-16
Payer: MEDICARE

## 2019-04-16 ENCOUNTER — OFFICE VISIT (OUTPATIENT)
Dept: INTERNAL MEDICINE CLINIC | Age: 79
End: 2019-04-16

## 2019-04-16 VITALS
SYSTOLIC BLOOD PRESSURE: 145 MMHG | BODY MASS INDEX: 29.63 KG/M2 | DIASTOLIC BLOOD PRESSURE: 75 MMHG | RESPIRATION RATE: 17 BRPM | TEMPERATURE: 95.9 F | HEART RATE: 61 BPM | WEIGHT: 161 LBS | OXYGEN SATURATION: 92 % | HEIGHT: 62 IN

## 2019-04-16 DIAGNOSIS — E11.21 TYPE 2 DIABETES MELLITUS WITH NEPHROPATHY (HCC): Chronic | ICD-10-CM

## 2019-04-16 DIAGNOSIS — I10 ESSENTIAL HYPERTENSION: Chronic | ICD-10-CM

## 2019-04-16 DIAGNOSIS — H91.90 HEARING LOSS, UNSPECIFIED HEARING LOSS TYPE, UNSPECIFIED LATERALITY: Primary | ICD-10-CM

## 2019-04-16 LAB
ALBUMIN SERPL-MCNC: 4.1 G/DL (ref 3.4–5)
ALBUMIN/GLOB SERPL: 1.3 {RATIO} (ref 0.8–1.7)
ALP SERPL-CCNC: 173 U/L (ref 45–117)
ALT SERPL-CCNC: 54 U/L (ref 16–61)
ANION GAP SERPL CALC-SCNC: 9 MMOL/L (ref 3–18)
AST SERPL-CCNC: 35 U/L (ref 15–37)
BILIRUB SERPL-MCNC: 1.4 MG/DL (ref 0.2–1)
BUN SERPL-MCNC: 20 MG/DL (ref 7–18)
BUN/CREAT SERPL: 13 (ref 12–20)
CALCIUM SERPL-MCNC: 8.7 MG/DL (ref 8.5–10.1)
CHLORIDE SERPL-SCNC: 107 MMOL/L (ref 100–108)
CHOLEST SERPL-MCNC: 119 MG/DL
CO2 SERPL-SCNC: 25 MMOL/L (ref 21–32)
CREAT SERPL-MCNC: 1.5 MG/DL (ref 0.6–1.3)
GLOBULIN SER CALC-MCNC: 3.2 G/DL (ref 2–4)
GLUCOSE SERPL-MCNC: 97 MG/DL (ref 74–99)
HBA1C MFR BLD: 6.1 % (ref 4.2–5.6)
HDLC SERPL-MCNC: 63 MG/DL (ref 40–60)
HDLC SERPL: 1.9 {RATIO} (ref 0–5)
LDLC SERPL CALC-MCNC: 42.4 MG/DL (ref 0–100)
LIPID PROFILE,FLP: ABNORMAL
POTASSIUM SERPL-SCNC: 3.4 MMOL/L (ref 3.5–5.5)
PROT SERPL-MCNC: 7.3 G/DL (ref 6.4–8.2)
SODIUM SERPL-SCNC: 141 MMOL/L (ref 136–145)
TRIGL SERPL-MCNC: 68 MG/DL (ref ?–150)
VLDLC SERPL CALC-MCNC: 13.6 MG/DL

## 2019-04-16 PROCEDURE — 83036 HEMOGLOBIN GLYCOSYLATED A1C: CPT

## 2019-04-16 PROCEDURE — 82043 UR ALBUMIN QUANTITATIVE: CPT

## 2019-04-16 PROCEDURE — 80061 LIPID PANEL: CPT

## 2019-04-16 PROCEDURE — 36415 COLL VENOUS BLD VENIPUNCTURE: CPT

## 2019-04-16 PROCEDURE — 80053 COMPREHEN METABOLIC PANEL: CPT

## 2019-04-16 NOTE — PROGRESS NOTES
HISTORY OF PRESENT ILLNESS  Gilda Garrett is a 78 y.o. male. Visit Vitals  /75 (BP 1 Location: Left arm, BP Patient Position: Sitting)   Pulse 61   Temp 95.9 °F (35.5 °C) (Oral)   Resp 17   Ht 5' 2\" (1.575 m)   Wt 161 lb (73 kg)   SpO2 92%   BMI 29.45 kg/m²       Pt says he was told he should have his hearing checked. She noted he says \"huh\" a lot. He does not think he has a problem  At [facility]        Current Outpatient Medications:  atorvastatin (LIPITOR) 40 mg tablet, Take 1 Tab by mouth daily. carvedilol (COREG) 6.25 mg tablet, Take 1 Tab by mouth two (2) times a day. KLOR-CON 10 10 mEq tablet, Take 1 Tab by mouth daily. magnesium oxide (MAG-OX) 400 mg tablet, TAKE 1 TABLET BY MOUTH EVERY DAY  isosorbide mononitrate ER (IMDUR) 30 mg tablet, TAKE 1 TABLET BY MOUTH EVERY DAY. PLEASE SCHEDULE FOLLOW UP WITH DOCTOR  lisinopril (PRINIVIL, ZESTRIL) 40 mg tablet, Take 1 Tab by mouth daily. aspirin delayed-release 81 mg tablet, TAKE 1 TABLET BY MOUTH DAILY  amiodarone (CORDARONE) 200 mg tablet, Take 0.5 Tabs by mouth daily. amLODIPine (NORVASC) 10 mg tablet, Take 10 mg by mouth daily. ELIQUIS 5 mg tablet, Take 5 mg by mouth two (2) times a day. OLANZapine (ZYPREXA) 2.5 mg tablet, Take 2.5 mg by mouth daily. Take 1 tab PO at bedtime  fluticasone (FLOVENT HFA) 44 mcg/actuation inhaler, Take  by inhalation. 2 inhalation inhale orally every 12 ours    No current facility-administered medications for this visit. Hearing Problem    The history is provided by the patient. This is a new problem. Associated symptoms include hearing loss. Pertinent negatives include no headaches. Diabetes   The history is provided by the patient. This is a chronic problem. The current episode started more than 1 week ago. The problem occurs daily. The problem has not changed since onset. Pertinent negatives include no chest pain, no headaches and no shortness of breath. Exacerbated by: diet.  The symptoms are relieved by medications (diet). Review of Systems   Constitutional: Negative. HENT: Positive for hearing loss and tinnitus. Negative for congestion. Respiratory: Negative for shortness of breath. Cardiovascular: Negative for chest pain and palpitations. Neurological: Negative for dizziness and headaches. Physical Exam   Constitutional: He is oriented to person, place, and time. He appears well-developed and well-nourished. No distress. HENT:   Both ear canals are clean   Eyes: Conjunctivae and EOM are normal.   Cardiovascular: Normal rate and regular rhythm. Pulmonary/Chest: Effort normal and breath sounds normal.   Musculoskeletal: He exhibits no edema. Neurological: He is alert and oriented to person, place, and time. Skin: Skin is warm and dry. He is not diaphoretic. Psychiatric: He has a normal mood and affect. Nursing note and vitals reviewed. ASSESSMENT and PLAN    ICD-10-CM ICD-9-CM    1. Hearing loss, unspecified hearing loss type, unspecified laterality H91.90 389.9 REFERRAL TO ENT-OTOLARYNGOLOGY   2. Type 2 diabetes mellitus with nephropathy (HCC) X03.43 313.15 METABOLIC PANEL, COMPREHENSIVE     583.81 LIPID PANEL      HEMOGLOBIN A1C W/O EAG      MICROALBUMIN, UR, RAND W/ MICROALB/CREAT RATIO   3.  Essential hypertension Z57 523.8 METABOLIC PANEL, COMPREHENSIVE      LIPID PANEL       BP up slightly but is OK for his age    Update lab inc for his blood sugar    Referral as noted    F/u 3-4 months for HTN, DM, CHOL

## 2019-04-16 NOTE — PROGRESS NOTES
ROOM # 4    Zafar Dasilva presents today for   Chief Complaint   Patient presents with    Other       Zafar Dasilva preferred language for health care discussion is english/other. Is someone accompanying this pt? Sister     Is the patient using any DME equipment during [address]? no    Depression Screening:  3 most recent [facility] Screens 7/19/2018 5/10/2018 4/5/2018 3/8/2018 2/3/2018 1/25/2018   Little interest or pleasure in doing things Not at all Not at all Not at all Not at all Not at all Not at all   Feeling down, depressed, irritable, or hopeless Not at all Not at all Not at all Not at all Not at all Not at all   Total Score PHQ 2 0 0 0 0 0 0       Learning Assessment:  Learning Assessment 1/25/2018   PRIMARY LEARNER Patient   HIGHEST LEVEL OF EDUCATION - PRIMARY LEARNER  DID NOT GRADUATE [facility] PRIMARY LEARNER NONE   CO-LEARNER CAREGIVER No   PRIMARY LANGUAGE ENGLISH   LEARNER PREFERENCE PRIMARY VIDEOS   ANSWERED BY patient   RELATIONSHIP SELF       Abuse Screening:  Abuse Screening Questionnaire 5/10/2018 4/5/2018 3/8/2018   Do you ever feel afraid of your partner? N N N   Are you in a relationship with someone who physically or mentally threatens you? N N N   Is it safe for you to go home? [place]       Fall Risk  Fall Risk Assessment, last 12 mths 7/19/2018 5/10/2018 4/5/2018 3/8/2018 2/3/2018 1/25/2018   Able to walk? Yes Yes Yes Yes Yes Yes   Fall in past 12 months? No No No No Yes Yes   Fall with injury? - - - - No Yes   Number of falls in past 12 months - - - - 1 1   Fall Risk Score - - - - 1 2       Health Maintenance reviewed and discussed per provider.  Yes    Zafar Dasilva is due for   Health Maintenance Due   Topic Date Due    EYE EXAM RETINAL OR DILATED  02/14/1950    Shingrix Vaccine Age 50> (1 of 2) 02/14/1990    GLAUCOMA SCREENING Q2Y  02/14/2005    Pneumococcal 65+ years (2 of 2 - PCV13) 06/01/2008    DTaP/Tdap/Td series (2 - Td) 06/01/2016    HEMOGLOBIN A1C Q6M  01/19/2019    MICROALBUMIN Q1  02/23/2019    MEDICARE YEARLY EXAM  05/11/2019     Please order/place referral if appropriate. Advance Directive:  1. Do you have an advance directive in place? Patient Reply: no    2. If not, would you like material regarding how to put one in place? Patient Reply: no    Coordination of Care:  1. Have you been to the ER, urgent care clinic since your last visit? Hospitalized since your last visit? no    2. Have you seen or consulted any other health care providers outside of the [address] [place] [address] since your last visit? Include any pap smears or colon screening.  Psychologist, caridologist

## 2019-04-17 LAB
CREAT UR-MCNC: 166 MG/DL (ref 30–125)
MICROALBUMIN UR-MCNC: 46.3 MG/DL (ref 0–3)
MICROALBUMIN/CREAT UR-RTO: 279 MG/G (ref 0–30)

## 2019-04-17 NOTE — PROGRESS NOTES
Please advise pt that he potassium is a bit low.  I would like him to double his potassium supplement for the next week, then resume current dose

## 2019-04-27 DIAGNOSIS — E78.5 HYPERLIPIDEMIA, UNSPECIFIED HYPERLIPIDEMIA TYPE: Chronic | ICD-10-CM

## 2019-04-27 DIAGNOSIS — I25.10 CHRONIC CORONARY ARTERY DISEASE: Chronic | ICD-10-CM

## 2019-04-28 RX ORDER — ASPIRIN 81 MG/1
TABLET ORAL
Qty: 60 TAB | Refills: 5 | Status: SHIPPED | OUTPATIENT
Start: 2019-04-28 | End: 2020-02-19

## 2019-05-06 DIAGNOSIS — I50.9 CONGESTIVE HEART FAILURE (HCC): ICD-10-CM

## 2019-05-06 DIAGNOSIS — E78.5 HYPERLIPIDEMIA, UNSPECIFIED HYPERLIPIDEMIA TYPE: Chronic | ICD-10-CM

## 2019-05-06 DIAGNOSIS — I25.10 CHRONIC CORONARY ARTERY DISEASE: Chronic | ICD-10-CM

## 2019-05-06 RX ORDER — APIXABAN 5 MG/1
5 TABLET, FILM COATED ORAL 2 TIMES DAILY
OUTPATIENT
Start: 2019-05-06

## 2019-05-06 NOTE — TELEPHONE ENCOUNTER
PCP: David Candelaria MD    Last appt: 12/12/2018  Future Appointments   Date Time Provider Solitario Fuchs   6/12/2019  1:30 PM Latrell Wagoner MD [address] [place] [address]   8/12/2019 10:20 AM EMMA Mensah [facility] EVITA SHAHRZAD       Requested Prescriptions     Pending Prescriptions Disp Refills    ELIQUIS 5 mg tablet 180 Tab 3     Sig: Take 1 Tab by mouth two (2) times a day.

## 2019-05-08 RX ORDER — APIXABAN 5 MG/1
5 TABLET, FILM COATED ORAL 2 TIMES DAILY
Qty: 180 TAB | Refills: 3 | Status: SHIPPED | OUTPATIENT
Start: 2019-05-08 | End: 2020-04-15

## 2019-05-30 DIAGNOSIS — E78.5 HYPERLIPIDEMIA, UNSPECIFIED HYPERLIPIDEMIA TYPE: Chronic | ICD-10-CM

## 2019-05-30 DIAGNOSIS — I25.10 CHRONIC CORONARY ARTERY DISEASE: Chronic | ICD-10-CM

## 2019-05-30 RX ORDER — CARVEDILOL 6.25 MG/1
TABLET ORAL
Qty: 60 TAB | Refills: 5 | Status: SHIPPED | OUTPATIENT
Start: 2019-05-30 | End: 2020-01-18

## 2019-06-12 ENCOUNTER — OFFICE VISIT (OUTPATIENT)
Dept: CARDIOLOGY CLINIC | Age: 79
End: 2019-06-12

## 2019-06-12 VITALS
HEIGHT: 62 IN | BODY MASS INDEX: 30 KG/M2 | DIASTOLIC BLOOD PRESSURE: 89 MMHG | HEART RATE: 68 BPM | SYSTOLIC BLOOD PRESSURE: 164 MMHG | OXYGEN SATURATION: 91 % | WEIGHT: 163 LBS

## 2019-06-12 DIAGNOSIS — I10 ESSENTIAL HYPERTENSION: Primary | ICD-10-CM

## 2019-06-12 DIAGNOSIS — Z79.899 ON AMIODARONE THERAPY: ICD-10-CM

## 2019-06-12 DIAGNOSIS — I48.0 PAROXYSMAL ATRIAL FIBRILLATION (HCC): ICD-10-CM

## 2019-06-12 NOTE — PROGRESS NOTES
Subjective:      Warden Cabot is seen today for cardiac evaluation. He is a 78 y.o. man with multiple medical problems including coronary artery disease, cardiomyopathy with ejection fraction of 50%, pulmonic stenosis, prior history of endocarditis of the pulmonic valve, paroxysmal atrial fibrillation, pulmonary hypertension, schizophrenia, chronic Amiodarone therapy, hyperlipidemia, diabetes and hypertension. The patient was seen earlier this year for follow up of  elevated LFTs. The patient is on Amiodarone therapy with a starting date which was not completely clear. He did require a cardioversion  in 2016 which may have been the time he was started on Amiodarone. He is also on statin therapy. In the office today, he reports that he feels good. His breathing has been okay; he gets occasional shortness of breath, which is unchanged recently. He has had no chest pain or discomfort. He has had no PND or orthopnea. He has mild leg swelling, which he states is stable. He denies palpitations.       Patient Active Problem List    Diagnosis Date Noted    Pulmonic stenosis 12/28/2018    Pulmonary hypertension ([address]     LVH (left ventricular hypertrophy) 03/17/2018    On amiodarone therapy 03/17/2018    RBBB 03/17/2018    Paroxysmal atrial fibrillation ([address] 84/75/6034    Diastolic dysfunction 85/87/9077    Pneumonia due to infectious organism 03/08/2018    Lung nodule 03/08/2018    Type 2 diabetes mellitus with nephropathy ([address] 02/03/2018    Type 2 diabetes mellitus without complication, without long-term current use of insulin ([address] 01/25/2018    Hyperlipidemia 01/25/2018    Thalassemia 01/27/2016    Gastroesophageal reflux disease with esophagitis 01/27/2016    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    Swelling of lower extremity 01/27/2016    Hypokalemia 01/27/2016    Visual hallucinations 01/27/2016    Essential hypertension 01/27/2016    Congestive heart failure ([address] 01/27/2016  Chronic schizophrenia ([facility] 01/27/2016    Chronic kidney disease 01/27/2016    Gout 01/27/2016    Chronic coronary artery disease 01/27/2016    Hematuria 01/27/2016     Current Outpatient Medications   Medication Sig Dispense Refill    carvedilol (COREG) 6.25 mg tablet TAKE 1 TABLET BY MOUTH TWICE A DAY 60 Tab 5    ELIQUIS 5 mg tablet Take 1 Tab by mouth two (2) times a day. 180 Tab 3    isosorbide mononitrate ER (IMDUR) 30 mg tablet TAKE 1 TABLET BY MOUTH EVERY DAY. PLEASE SCHEDULE FOLLOW UP WITH DOCTOR 90 Tab 4    aspirin delayed-release 81 mg tablet TAKE 1 TABLET BY MOUTH EVERY DAY 60 Tab 5    atorvastatin (LIPITOR) 40 mg tablet Take 1 Tab by mouth daily. 30 Tab 5    KLOR-CON 10 10 mEq tablet Take 1 Tab by mouth daily. 90 Tab 1    magnesium oxide (MAG-OX) 400 mg tablet TAKE 1 TABLET BY MOUTH EVERY DAY 90 Tab 1    lisinopril (PRINIVIL, ZESTRIL) 40 mg tablet Take 1 Tab by mouth daily. 90 Tab 1    amiodarone (CORDARONE) 200 mg tablet Take 0.5 Tabs by mouth daily. 30 Tab 5    amLODIPine (NORVASC) 10 mg tablet Take 10 mg by mouth daily. 6    OLANZapine (ZYPREXA) 2.5 mg tablet Take 2.5 mg by mouth daily. Take 1 tab PO at bedtime  6    fluticasone (FLOVENT HFA) 44 mcg/actuation inhaler Take  by inhalation. 2 inhalation inhale orally every 12 ours       No Known Allergies  Past Medical History:   Diagnosis Date    Adrenal myelolipoma     Benign non-nodular prostatic hyperplasia with lower urinary tract symptoms     Beta thalassemia trait     CAD (coronary artery disease)     Cardiomyopathy (HCC)     EF 30%    Diabetes mellitus ([facility]     Diabetic nephropathy ([facility]     Diverticulosis     Elevated PSA     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 Isabella Prader    HX ORTHOPAEDIC  5119'M    wound repair L. arm    HX TURP  03/01/2017    [facility]     Family History   Problem Relation Age of Onset    Hypertension Mother     Heart Disease Mother     Hypertension Father     Hypertension Brother      Social History     Tobacco Use   Smoking Status Former Smoker    Packs/day: 0.25    Years: 30.00    Pack years: 7.50    Types: Cigarettes   Smokeless Tobacco Never Used          Review of Systems, additional:  Constitutional: negative  Eyes: negative  Respiratory: positive for dyspnea on exertion - stable  Cardiovascular: positive for dyspnea on exertion - stable  Gastrointestinal: negative  Musculoskeletal:negative  Neurological: negative  Behvioral/Psych: negative  Endocrine: negative  ENT: negative    Objective:     Visit Vitals  /89   Pulse 68   Ht 5' 2\" (1.575 m)   Wt 163 lb (73.9 kg)   SpO2 91%   BMI 29.81 kg/m²     General:  alert, cooperative, no distress   Chest Wall: inspection normal - no chest wall deformities or tenderness, respiratory effort normal   Lung: clear to auscultation bilaterally   Heart:  normal rate and regular rhythm, S1 and S2 normal, systolic murmur 2/6 at the left second intercostal space border   Abdomen: soft, non-tender. Bowel sounds normal. No masses,  no organomegaly   Extremities: 1+ pitting edema to lower extremities Skin: no rashes   Neuro: alert, oriented, normal speech, no focal findings or movement disorder noted         Assessment/Plan:         ICD-10-CM ICD-9-CM    1.  LVH (left ventricular hypertrophy), moderate by recent echo 02/07/2018. Other findings including severely dilated RV and pulmonary hypertension, moderate pulmonic stenosis with last transvalvular velocity in 2016, 3.2 m/s I51.7 429.3    2. Hyperlipidemia, unspecified hyperlipidemia type E78.5 272.4    3. Chronic coronary artery disease, nuclear stress test done 02/2016; small apical lateral scar with no significant ischemia. I25.10 414.00    4. Congestive heart failure, unspecified congestive heart failure chronicity, unspecified congestive heart failure type (HCC) RT 6 mos I50.9 428.0    5. Essential hypertension, mild elevation of systolic and diastolic BP in office today. /92 --> 164/89 on re-check. Will continue current medication regimen. I10 401.9    6. Type 2 diabetes mellitus with nephropathy (HCC) E11.21 250.40      583.81    7. Chronic kidney disease, unspecified CKD stage N18.9 585.9    8. Chronic schizophrenia ([facility] F20.9 295.62    9. On amiodarone therapy, taking 100 mg daily, LFT's checked in 04/2019 Z79.899 V58.69    10.  Paroxysmal atrial fibrillation ([address] S/P cardioversion 1/4/2016, on amiodarone  I48.0 427.31

## 2019-06-12 NOTE — PROGRESS NOTES
1. Have you been to the ER, urgent care clinic since your last visit? Hospitalized since your last visit? No    2. Have you seen or consulted any other health care providers outside of the [address] [place] [address] since your last visit? Include any pap smears or colon screening.  No

## 2019-07-06 DIAGNOSIS — Z79.899 MEDICATION MANAGEMENT: ICD-10-CM

## 2019-07-07 RX ORDER — LISINOPRIL 40 MG/1
TABLET ORAL
Qty: 90 TAB | Refills: 1 | Status: SHIPPED | OUTPATIENT
Start: 2019-07-07 | End: 2020-01-06

## 2019-08-10 DIAGNOSIS — Z79.899 MEDICATION MANAGEMENT: ICD-10-CM

## 2019-08-11 RX ORDER — POTASSIUM CHLORIDE 750 MG/1
TABLET, FILM COATED, EXTENDED RELEASE ORAL
Qty: 90 TAB | Refills: 1 | Status: SHIPPED | OUTPATIENT
Start: 2019-08-11 | End: 2020-02-26

## 2019-08-26 DIAGNOSIS — I25.10 CHRONIC CORONARY ARTERY DISEASE: Chronic | ICD-10-CM

## 2019-08-26 DIAGNOSIS — I50.9 CONGESTIVE HEART FAILURE, UNSPECIFIED HF CHRONICITY, UNSPECIFIED HEART FAILURE TYPE (HCC): ICD-10-CM

## 2019-08-26 DIAGNOSIS — E78.5 HYPERLIPIDEMIA, UNSPECIFIED HYPERLIPIDEMIA TYPE: Chronic | ICD-10-CM

## 2019-08-26 RX ORDER — ATORVASTATIN CALCIUM 40 MG/1
TABLET, FILM COATED ORAL
Qty: 90 TAB | Refills: 1 | Status: SHIPPED | OUTPATIENT
Start: 2019-08-26 | End: 2020-02-22

## 2019-09-03 DIAGNOSIS — E87.6 HYPOKALEMIA: ICD-10-CM

## 2019-09-03 DIAGNOSIS — Z79.899 MEDICATION MANAGEMENT: ICD-10-CM

## 2019-09-03 RX ORDER — LANOLIN ALCOHOL/MO/W.PET/CERES
CREAM (GRAM) TOPICAL
Qty: 90 TAB | Refills: 1 | Status: SHIPPED | OUTPATIENT
Start: 2019-09-03 | End: 2020-07-20

## 2019-11-08 PROBLEM — I33.0 INFECTIVE ENDOCARDITIS: Status: ACTIVE | Noted: 2019-11-08

## 2019-11-08 PROBLEM — R00.2 PALPITATIONS: Status: ACTIVE | Noted: 2019-11-08

## 2019-11-08 PROBLEM — K11.7 APTYALISM: Status: ACTIVE | Noted: 2019-11-08

## 2019-11-08 PROBLEM — H90.5 SENSORY HEARING LOSS: Status: ACTIVE | Noted: 2019-11-08

## 2019-11-08 PROBLEM — R78.81 GRAM-POSITIVE BACTEREMIA: Status: ACTIVE | Noted: 2019-11-08

## 2019-11-08 PROBLEM — I38 HEART VALVE DISEASE: Status: ACTIVE | Noted: 2019-11-08

## 2019-11-08 PROBLEM — Z92.89 PERSONAL HISTORY OF OTHER MEDICAL TREATMENT: Status: ACTIVE | Noted: 2019-11-08

## 2019-11-08 PROBLEM — R06.02 SHORTNESS OF BREATH: Status: ACTIVE | Noted: 2019-11-08

## 2019-11-08 PROBLEM — R65.10 SIRS (SYSTEMIC INFLAMMATORY RESPONSE SYNDROME) (HCC): Status: ACTIVE | Noted: 2018-02-08

## 2019-11-08 PROBLEM — J44.9 CHRONIC OBSTRUCTIVE PULMONARY DISEASE (HCC): Status: ACTIVE | Noted: 2019-11-08

## 2019-11-08 PROBLEM — R09.02 HYPOXIA: Status: ACTIVE | Noted: 2019-11-08

## 2019-11-08 PROBLEM — N40.0 ENLARGED PROSTATE: Status: ACTIVE | Noted: 2019-11-08

## 2019-11-08 PROBLEM — R33.9 RETENTION OF URINE: Status: ACTIVE | Noted: 2019-11-08

## 2019-11-08 PROBLEM — R41.82 ALTERED MENTAL STATUS: Status: ACTIVE | Noted: 2019-11-08

## 2019-11-08 PROBLEM — R77.8 ELEVATED TROPONIN: Status: ACTIVE | Noted: 2018-02-07
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
## 2019-11-08 PROBLEM — E27.8 LEFT ADRENAL MASS (HCC): Status: ACTIVE | Noted: 2017-06-25

## 2019-11-08 PROBLEM — R55 SYNCOPE AND COLLAPSE: Status: ACTIVE | Noted: 2017-07-19

## 2019-12-16 ENCOUNTER — OFFICE VISIT (OUTPATIENT)
Dept: CARDIOLOGY CLINIC | Age: 79
End: 2019-12-16

## 2019-12-16 VITALS
WEIGHT: 168 LBS | DIASTOLIC BLOOD PRESSURE: 85 MMHG | SYSTOLIC BLOOD PRESSURE: 166 MMHG | BODY MASS INDEX: 30.73 KG/M2 | HEART RATE: 71 BPM | OXYGEN SATURATION: 93 %

## 2019-12-16 DIAGNOSIS — I50.22 CHRONIC SYSTOLIC CONGESTIVE HEART FAILURE (HCC): Primary | ICD-10-CM

## 2019-12-16 NOTE — PROGRESS NOTES
1. Have you been to the ER, urgent care clinic since your last visit? Hospitalized since your last visit? No     2. Have you seen or consulted any other health care providers outside of the [address] [place] [address] since your last visit? Include any pap smears or colon screening.   No

## 2019-12-22 NOTE — PROGRESS NOTES
Subjective:      Chika Pastrana is seen today for cardiac evaluation. He is a 78 y.o. man with multiple medical problems including coronary artery disease, cardiomyopathy with ejection fraction of 50%, pulmonic stenosis, prior history of endocarditis of the pulmonic valve, paroxysmal atrial fibrillation, pulmonary hypertension, schizophrenia, chronic Amiodarone therapy, hyperlipidemia, diabetes and hypertension. The patient was seen earlier this year for follow up of  elevated LFTs. The patient is on Amiodarone therapy with a starting date which was not completely clear. He did require a cardioversion  in 2016 which may have been the time he was started on Amiodarone. His Amiodarone was decreased to 100 mg daily at last visit. His LFTs were rechecked and April of this year. Results are listed below. He is also on statin therapy. In the office today, he reports that he feels \"fine\". He has had no chest pain or shortness of breath. He believes he can walk a block without limiting dyspnea. He has difficulty with stairs. He has had no PND or orthopnea. He has had no palpitations, near-syncope or syncope.         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    Chronic obstructive pulmonary disease ([address] 11/08/2019    Enlarged prostate 11/08/2019    Fall 11/08/2019    Infective endocarditis 11/08/2019    Hypoxia 11/08/2019    Heart valve disease 11/08/2019    Gram-positive bacteremia 11/08/2019    Shortness of breath 11/08/2019    Sensory hearing loss 11/08/2019    Retention of urine 11/08/2019    Personal history of other medical treatment 11/08/2019    Palpitations 11/08/2019    Pulmonic stenosis 12/28/2018    Pulmonary hypertension (HCC)     LVH (left ventricular hypertrophy) 03/17/2018    On amiodarone therapy 03/17/2018    RBBB 03/17/2018    Paroxysmal atrial fibrillation ([address] 55/04/3383    Diastolic dysfunction 03/17/2018    Pneumonia due to infectious organism 03/08/2018    Lung nodule 03/08/2018    SIRS (systemic inflammatory response syndrome) (HCC) 02/08/2018    Elevated troponin 02/07/2018    Type 2 diabetes mellitus with nephropathy ([facility] 02/03/2018    Type 2 diabetes mellitus without complication, without long-term current use of insulin ([facility] 01/25/2018    Hyperlipidemia 01/25/2018    Syncope and collapse 07/19/2017    Left adrenal mass ([address] 06/25/2017    Acute bacterial endocarditis 09/28/2016    ENID (acute kidney injury) ([facility] 09/09/2016    Chronic systolic congestive heart failure ([facility] 02/08/2016    Thalassemia 01/27/2016    Gastroesophageal reflux disease with esophagitis 01/27/2016    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    Swelling of lower extremity 01/27/2016    Hypokalemia 01/27/2016    Visual hallucinations 01/27/2016    Essential hypertension 01/27/2016    Congestive heart failure (HCC) 01/27/2016    Chronic schizophrenia ([facility] 01/27/2016    Chronic kidney disease 01/27/2016    Gout 01/27/2016    Chronic coronary artery disease 01/27/2016    Hematuria 01/27/2016    Acute on chronic diastolic heart failure ([facility] 12/30/2015    Non-adherence to medical treatment 11/26/2015    GERD (gastroesophageal reflux disease) 04/23/2011    QT prolongation 04/23/2011    Refusal of blood transfusions as patient is [employer] 03/17/2011     Current Outpatient Medications   Medication Sig Dispense Refill    omeprazole (PRILOSEC) 20 mg capsule Take 20 mg by mouth.  nitroglycerin (NITROSTAT) 0.4 mg SL tablet 0.4 mg by SubLINGual route.       magnesium oxide (MAG-OX) 400 mg tablet TAKE 1 TABLET BY MOUTH EVERY DAY 90 Tab 1    atorvastatin (LIPITOR) 40 mg tablet TAKE 1 TABLET BY MOUTH EVERY DAY 90 Tab 1    potassium chloride SR (KLOR-CON 10) 10 mEq tablet TAKE 1 TABLET BY MOUTH EVERY DAY 90 Tab 1    lisinopril (PRINIVIL, ZESTRIL) 40 mg tablet TAKE 1 TABLET BY MOUTH EVERY DAY 90 Tab 1    carvedilol (COREG) 6.25 mg tablet TAKE 1 TABLET BY MOUTH TWICE A DAY 60 Tab 5    ELIQUIS 5 mg tablet Take 1 Tab by mouth two (2) times a day. 180 Tab 3    isosorbide mononitrate ER (IMDUR) 30 mg tablet TAKE 1 TABLET BY MOUTH EVERY DAY. PLEASE SCHEDULE FOLLOW UP WITH DOCTOR 90 Tab 4    aspirin delayed-release 81 mg tablet TAKE 1 TABLET BY MOUTH EVERY DAY 60 Tab 5    amiodarone (CORDARONE) 200 mg tablet Take 0.5 Tabs by mouth daily. 30 Tab 5    amLODIPine (NORVASC) 10 mg tablet Take 10 mg by mouth daily. 6    OLANZapine (ZYPREXA) 2.5 mg tablet Take 2.5 mg by mouth daily. Take 1 tab PO at bedtime  6    fluticasone (FLOVENT HFA) 44 mcg/actuation inhaler Take  by inhalation. 2 inhalation inhale orally every 12 ours       No Known Allergies  Past Medical History:   Diagnosis Date    Adrenal myelolipoma     Benign non-nodular prostatic hyperplasia with lower urinary tract symptoms     Beta thalassemia trait     CAD (coronary artery disease)     Cardiomyopathy (HCC)     EF 30%    Diabetes mellitus ([address]     Diabetic nephropathy ([address]     Diverticulosis     Elevated PSA     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  Faecalis    Enlarged prostate     GERD (gastroesophageal reflux disease)     Gout     Heart failure (HCC)     Hematuria     Hyperlipidemia     Hypertension     Incomplete emptying of bladder     Lacunar infarction (HCC)     left basal ganglia, right cerebellar    Other ill-defined conditions(799.89)     gout    PAF (paroxysmal atrial fibrillation) (HCC)     Pulmonary hypertension (HCC)     Rectal polyp     Right renal mass     Schizophrenia ([facility]     Stopped smoking with greater than 10 pack year history     Vitamin D deficiency     Weight loss, unintentional      Past Surgical History:   Procedure Laterality Date    HX COLONOSCOPY  04/17/2013    Dr. Jesus Manuel Mo    HX ORTHOPAEDIC  7[address]    wound repair L. arm    HX TURP  03/01/2017    [facility] History   Problem Relation Age of Onset    Hypertension Mother     Heart Disease Mother     Hypertension Father     Hypertension Brother      Social History     Tobacco Use   Smoking Status Former Smoker    Packs/day: 0.25    Years: 30.00    Pack years: 7.50    Types: Cigarettes   Smokeless Tobacco Never Used          Review of Systems, additional:  Constitutional: negative  Eyes: negative  Respiratory: positive for dyspnea on exertion - improving  Cardiovascular: positive for dyspnea on exertion - improving  Gastrointestinal: negative  Musculoskeletal:negative  Neurological: negative  Behvioral/Psych: negative  Endocrine: negative  ENT: negative    Objective:     Visit Vitals  /85   Pulse 71   Wt 76.2 kg (168 lb)   SpO2 93%   BMI 30.73 kg/m²     General:  alert, cooperative, no distress   Chest Wall: inspection normal - no chest wall deformities or tenderness, respiratory effort normal   Lung: clear to auscultation bilaterally   Heart:  normal rate and regular rhythm, S1 and S2 normal, systolic murmur 2/6 at the left second intercostal space border   Abdomen: soft, non-tender. Bowel sounds normal. No masses,  no organomegaly   Extremities: 1+ pitting edema to lower extremities Skin: no rashes   Neuro: alert, oriented, normal speech, no focal findings or movement disorder noted         Assessment/Plan:         ICD-10-CM ICD-9-CM    1. LVH (left ventricular hypertrophy), moderate by recent echo 02/07/2018. Other findings including severely dilated RV and pulmonary hypertension, moderate pulmonic stenosis with last transvalvular velocity in 2016, 3.2 m/s. Will repeat echocardiogram next visit I51.7 429.3    2. Hyperlipidemia, unspecified hyperlipidemia type E78.5 272.4    3. Chronic coronary artery disease, nuclear stress test done 02/2016; small apical lateral scar with no significant ischemia. I25.10 414.00    4.  Congestive heart failure, unspecified congestive heart failure chronicity, unspecified congestive heart failure type (HCC) RT 6 mos I50.9 428.0    5. Essential hypertension, moderate elevation of systolic  BP in office today. I10 401.9    6. Type 2 diabetes mellitus with nephropathy (HCC) E11.21 250.40      583.81    7. Chronic kidney disease, unspecified CKD stage N18.9 585.9    8. Chronic schizophrenia ([facility] F20.9 295.62    9. On amiodarone therapy, taking 100 mg daily. LFTs on 4/16/2019; ALT 54, AST 35, total bilirubin 1.4. Return in 6 months. Check thyroid functions at that time and chest x-ray Z79.899 V58.69    10. Paroxysmal atrial fibrillation ([facility] S/P cardioversion 1/4/2016, on amiodarone  I48.0 427.31    11. Pulmonic stenosis, arrange for repeat echocardiogram at next visit.

## 2020-01-06 DIAGNOSIS — Z79.899 MEDICATION MANAGEMENT: ICD-10-CM

## 2020-01-06 RX ORDER — LISINOPRIL 40 MG/1
TABLET ORAL
Qty: 90 TAB | Refills: 1 | Status: SHIPPED | OUTPATIENT
Start: 2020-01-06 | End: 2020-07-02

## 2020-01-18 DIAGNOSIS — I25.10 CHRONIC CORONARY ARTERY DISEASE: Chronic | ICD-10-CM

## 2020-01-18 DIAGNOSIS — E78.5 HYPERLIPIDEMIA, UNSPECIFIED HYPERLIPIDEMIA TYPE: Chronic | ICD-10-CM

## 2020-01-18 RX ORDER — CARVEDILOL 6.25 MG/1
TABLET ORAL
Qty: 180 TAB | Refills: 1 | Status: SHIPPED | OUTPATIENT
Start: 2020-01-18 | End: 2020-08-02

## 2020-01-27 DIAGNOSIS — I50.9 CONGESTIVE HEART FAILURE (HCC): ICD-10-CM

## 2020-01-27 DIAGNOSIS — I25.10 CHRONIC CORONARY ARTERY DISEASE: Chronic | ICD-10-CM

## 2020-01-27 DIAGNOSIS — E78.5 HYPERLIPIDEMIA, UNSPECIFIED HYPERLIPIDEMIA TYPE: Chronic | ICD-10-CM

## 2020-01-27 RX ORDER — AMLODIPINE BESYLATE 10 MG/1
10 TABLET ORAL DAILY
Qty: 30 TAB | Refills: 2 | Status: SHIPPED | OUTPATIENT
Start: 2020-01-27 | End: 2020-04-15

## 2020-01-29 NOTE — TELEPHONE ENCOUNTER
Attempted to contact pt at  number, no answer. Lvm for pt to return call to office at [phone] . Jane Kendall

## 2020-02-19 DIAGNOSIS — E78.5 HYPERLIPIDEMIA, UNSPECIFIED HYPERLIPIDEMIA TYPE: Chronic | ICD-10-CM

## 2020-02-19 DIAGNOSIS — I25.10 CHRONIC CORONARY ARTERY DISEASE: Chronic | ICD-10-CM

## 2020-02-19 RX ORDER — ASPIRIN 81 MG/1
TABLET ORAL
Qty: 60 TAB | Refills: 0 | Status: SHIPPED | OUTPATIENT
Start: 2020-02-19 | End: 2020-04-13

## 2020-02-22 DIAGNOSIS — I25.10 CHRONIC CORONARY ARTERY DISEASE: Chronic | ICD-10-CM

## 2020-02-22 DIAGNOSIS — E78.5 HYPERLIPIDEMIA, UNSPECIFIED HYPERLIPIDEMIA TYPE: Chronic | ICD-10-CM

## 2020-02-22 DIAGNOSIS — I50.9 CONGESTIVE HEART FAILURE, UNSPECIFIED HF CHRONICITY, UNSPECIFIED HEART FAILURE TYPE (HCC): ICD-10-CM

## 2020-02-22 RX ORDER — ATORVASTATIN CALCIUM 40 MG/1
TABLET, FILM COATED ORAL
Qty: 90 TAB | Refills: 1 | Status: SHIPPED | OUTPATIENT
Start: 2020-02-22 | End: 2020-08-22

## 2020-02-26 DIAGNOSIS — Z79.899 MEDICATION MANAGEMENT: ICD-10-CM

## 2020-02-26 RX ORDER — POTASSIUM CHLORIDE 750 MG/1
TABLET, FILM COATED, EXTENDED RELEASE ORAL
Qty: 90 TAB | Refills: 1 | Status: SHIPPED | OUTPATIENT
Start: 2020-02-26 | End: 2020-08-16

## 2020-03-27 ENCOUNTER — TELEPHONE (OUTPATIENT)
Dept: INTERNAL MEDICINE CLINIC | Age: 80
End: 2020-03-27

## 2020-03-27 NOTE — TELEPHONE ENCOUNTER
Pt requesting to come in the office for a hospital f/u patient stated Er visit to [facility] was a month ago I inform the patient pt most er f/u require ov within a week not a month patient still questing to come do a er f/u for nose bleeding

## 2020-03-27 NOTE — TELEPHONE ENCOUNTER
Call made to pt. Pt states that he is not having any problems and his nose is no longer bleeding.   Pt just want to know if he should continue taking his Eliquis 5 mg BID      Please advise

## 2020-04-13 DIAGNOSIS — I25.10 CHRONIC CORONARY ARTERY DISEASE: Chronic | ICD-10-CM

## 2020-04-13 DIAGNOSIS — E78.5 HYPERLIPIDEMIA, UNSPECIFIED HYPERLIPIDEMIA TYPE: Chronic | ICD-10-CM

## 2020-04-13 RX ORDER — ASPIRIN 81 MG/1
TABLET ORAL
Qty: 60 TAB | Refills: 0 | Status: SHIPPED | OUTPATIENT
Start: 2020-04-13 | End: 2020-06-08

## 2020-04-15 RX ORDER — APIXABAN 5 MG/1
TABLET, FILM COATED ORAL
Qty: 180 TAB | Refills: 3 | Status: SHIPPED | OUTPATIENT
Start: 2020-04-15 | End: 2021-03-29

## 2020-07-02 DIAGNOSIS — Z79.899 MEDICATION MANAGEMENT: ICD-10-CM

## 2020-07-02 RX ORDER — LISINOPRIL 40 MG/1
TABLET ORAL
Qty: 90 TAB | Refills: 1 | Status: SHIPPED | OUTPATIENT
Start: 2020-07-02 | End: 2020-10-19

## 2020-07-18 DIAGNOSIS — Z79.899 MEDICATION MANAGEMENT: ICD-10-CM

## 2020-07-18 RX ORDER — ISOSORBIDE MONONITRATE 30 MG/1
TABLET, EXTENDED RELEASE ORAL
Qty: 90 TAB | Refills: 4 | Status: SHIPPED | OUTPATIENT
Start: 2020-07-18 | End: 2021-10-08

## 2020-07-20 DIAGNOSIS — E87.6 HYPOKALEMIA: ICD-10-CM

## 2020-07-20 DIAGNOSIS — Z79.899 MEDICATION MANAGEMENT: ICD-10-CM

## 2020-07-20 RX ORDER — LANOLIN ALCOHOL/MO/W.PET/CERES
CREAM (GRAM) TOPICAL
Qty: 90 TAB | Refills: 1 | Status: SHIPPED | OUTPATIENT
Start: 2020-07-20 | End: 2021-01-19

## 2020-08-16 DIAGNOSIS — Z79.899 MEDICATION MANAGEMENT: ICD-10-CM

## 2020-08-16 RX ORDER — POTASSIUM CHLORIDE 750 MG/1
TABLET, FILM COATED, EXTENDED RELEASE ORAL
Qty: 90 TAB | Refills: 1 | Status: SHIPPED | OUTPATIENT
Start: 2020-08-16 | End: 2021-02-07

## 2020-08-22 DIAGNOSIS — I25.10 CHRONIC CORONARY ARTERY DISEASE: Chronic | ICD-10-CM

## 2020-08-22 DIAGNOSIS — E78.5 HYPERLIPIDEMIA, UNSPECIFIED HYPERLIPIDEMIA TYPE: Chronic | ICD-10-CM

## 2020-08-22 DIAGNOSIS — I50.9 CONGESTIVE HEART FAILURE, UNSPECIFIED HF CHRONICITY, UNSPECIFIED HEART FAILURE TYPE (HCC): ICD-10-CM

## 2020-08-22 RX ORDER — ATORVASTATIN CALCIUM 40 MG/1
TABLET, FILM COATED ORAL
Qty: 90 TAB | Refills: 1 | Status: SHIPPED | OUTPATIENT
Start: 2020-08-22 | End: 2021-02-10 | Stop reason: SDUPTHER

## 2020-09-08 ENCOUNTER — TELEPHONE (OUTPATIENT)
Dept: CARDIOLOGY CLINIC | Age: 80
End: 2020-09-08

## 2020-09-08 NOTE — TELEPHONE ENCOUNTER
[email or web address] reports following serial blood pressures.        BP:    9/8 152/85  9/7 175/91  9/6 165/83  9/5 150/81  9/4 168/97

## 2020-09-09 NOTE — TELEPHONE ENCOUNTER
Contacted pt at [facility] number. Two patient Identifiers confirmed. Inquired when he took his BP. Pt stated he takes his BP before meds. Advised pt to take his BP before and  three hours after meds and call next week and advise of those number. Pt verbalized understanding.

## 2020-09-29 ENCOUNTER — TELEPHONE (OUTPATIENT)
Dept: CARDIOLOGY CLINIC | Age: 80
End: 2020-09-29

## 2020-10-05 ENCOUNTER — OFFICE VISIT (OUTPATIENT)
Dept: INTERNAL MEDICINE CLINIC | Age: 80
End: 2020-10-05
Payer: MEDICARE

## 2020-10-05 ENCOUNTER — HOSPITAL ENCOUNTER (OUTPATIENT)
Dept: LAB | Age: 80
Discharge: HOME | End: 2020-10-05
Payer: MEDICARE

## 2020-10-05 VITALS
WEIGHT: 164 LBS | DIASTOLIC BLOOD PRESSURE: 92 MMHG | OXYGEN SATURATION: 94 % | RESPIRATION RATE: 16 BRPM | BODY MASS INDEX: 30.18 KG/M2 | SYSTOLIC BLOOD PRESSURE: 164 MMHG | TEMPERATURE: 98.4 F | HEIGHT: 62 IN | HEART RATE: 74 BPM

## 2020-10-05 DIAGNOSIS — E11.21 TYPE 2 DIABETES MELLITUS WITH NEPHROPATHY (HCC): Chronic | ICD-10-CM

## 2020-10-05 DIAGNOSIS — E78.5 HYPERLIPIDEMIA, UNSPECIFIED HYPERLIPIDEMIA TYPE: Chronic | ICD-10-CM

## 2020-10-05 DIAGNOSIS — I48.0 PAROXYSMAL ATRIAL FIBRILLATION (HCC): ICD-10-CM

## 2020-10-05 DIAGNOSIS — J44.9 CHRONIC OBSTRUCTIVE PULMONARY DISEASE, UNSPECIFIED COPD TYPE (HCC): ICD-10-CM

## 2020-10-05 DIAGNOSIS — I25.10 CHRONIC CORONARY ARTERY DISEASE: Chronic | ICD-10-CM

## 2020-10-05 DIAGNOSIS — I50.22 CHRONIC SYSTOLIC CONGESTIVE HEART FAILURE (HCC): ICD-10-CM

## 2020-10-05 DIAGNOSIS — F20.9 CHRONIC SCHIZOPHRENIA (HCC): ICD-10-CM

## 2020-10-05 DIAGNOSIS — Z00.00 MEDICARE ANNUAL WELLNESS VISIT, SUBSEQUENT: Primary | ICD-10-CM

## 2020-10-05 DIAGNOSIS — E27.8 LEFT ADRENAL MASS (HCC): ICD-10-CM

## 2020-10-05 DIAGNOSIS — I10 ESSENTIAL HYPERTENSION: Chronic | ICD-10-CM

## 2020-10-05 LAB
ALBUMIN SERPL-MCNC: 4.6 G/DL (ref 3.4–5)
ALBUMIN/GLOB SERPL: 1.2 {RATIO} (ref 0.8–1.7)
ALP SERPL-CCNC: 148 U/L (ref 45–117)
ALT SERPL-CCNC: 53 U/L (ref 16–61)
ANION GAP SERPL CALC-SCNC: 10 MMOL/L (ref 3–18)
APPEARANCE UR: CLEAR
AST SERPL-CCNC: 33 U/L (ref 10–38)
BACTERIA URNS QL MICRO: NEGATIVE /HPF
BILIRUB SERPL-MCNC: 1.7 MG/DL (ref 0.2–1)
BILIRUB UR QL: NEGATIVE
BUN SERPL-MCNC: 20 MG/DL (ref 7–18)
BUN/CREAT SERPL: 14 (ref 12–20)
CALCIUM SERPL-MCNC: 9.7 MG/DL (ref 8.5–10.1)
CHLORIDE SERPL-SCNC: 107 MMOL/L (ref 100–111)
CHOLEST SERPL-MCNC: 116 MG/DL
CO2 SERPL-SCNC: 24 MMOL/L (ref 21–32)
COLOR UR: YELLOW
CREAT SERPL-MCNC: 1.46 MG/DL (ref 0.6–1.3)
CREAT UR-MCNC: 80 MG/DL (ref 30–125)
EPITH CASTS URNS QL MICRO: NEGATIVE /LPF (ref 0–5)
GLOBULIN SER CALC-MCNC: 3.8 G/DL (ref 2–4)
GLUCOSE SERPL-MCNC: 87 MG/DL (ref 74–99)
GLUCOSE UR STRIP.AUTO-MCNC: NEGATIVE MG/DL
HBA1C MFR BLD: 5.5 % (ref 4.2–5.6)
HDLC SERPL-MCNC: 64 MG/DL (ref 40–60)
HDLC SERPL: 1.8 {RATIO} (ref 0–5)
HGB UR QL STRIP: ABNORMAL
KETONES UR QL STRIP.AUTO: NEGATIVE MG/DL
LDLC SERPL CALC-MCNC: 37 MG/DL (ref 0–100)
LEUKOCYTE ESTERASE UR QL STRIP.AUTO: NEGATIVE
LIPID PROFILE,FLP: ABNORMAL
MICROALBUMIN UR-MCNC: 107 MG/DL (ref 0–3)
MICROALBUMIN/CREAT UR-RTO: 1338 MG/G (ref 0–30)
NITRITE UR QL STRIP.AUTO: NEGATIVE
PH UR STRIP: 5.5 [PH] (ref 5–8)
POTASSIUM SERPL-SCNC: 4 MMOL/L (ref 3.5–5.5)
PROT SERPL-MCNC: 8.4 G/DL (ref 6.4–8.2)
PROT UR STRIP-MCNC: 100 MG/DL
RBC #/AREA URNS HPF: NORMAL /HPF (ref 0–5)
SODIUM SERPL-SCNC: 141 MMOL/L (ref 136–145)
SP GR UR REFRACTOMETRY: 1.01 (ref 1–1.03)
TRIGL SERPL-MCNC: 75 MG/DL (ref ?–150)
UROBILINOGEN UR QL STRIP.AUTO: 0.2 EU/DL (ref 0.2–1)
VLDLC SERPL CALC-MCNC: 15 MG/DL
WBC URNS QL MICRO: NORMAL /HPF (ref 0–4)

## 2020-10-05 PROCEDURE — 36415 COLL VENOUS BLD VENIPUNCTURE: CPT

## 2020-10-05 PROCEDURE — G8536 NO DOC ELDER MAL SCRN: HCPCS | Performed by: INTERNAL MEDICINE

## 2020-10-05 PROCEDURE — 83036 HEMOGLOBIN GLYCOSYLATED A1C: CPT

## 2020-10-05 PROCEDURE — G8417 CALC BMI ABV UP PARAM F/U: HCPCS | Performed by: INTERNAL MEDICINE

## 2020-10-05 PROCEDURE — G8753 SYS BP > OR = 140: HCPCS | Performed by: INTERNAL MEDICINE

## 2020-10-05 PROCEDURE — G0444 DEPRESSION SCREEN ANNUAL: HCPCS | Performed by: INTERNAL MEDICINE

## 2020-10-05 PROCEDURE — G8510 SCR DEP NEG, NO PLAN REQD: HCPCS | Performed by: INTERNAL MEDICINE

## 2020-10-05 PROCEDURE — 80061 LIPID PANEL: CPT

## 2020-10-05 PROCEDURE — G8427 DOCREV CUR MEDS BY ELIG CLIN: HCPCS | Performed by: INTERNAL MEDICINE

## 2020-10-05 PROCEDURE — 99214 OFFICE O/P EST MOD 30 MIN: CPT | Performed by: INTERNAL MEDICINE

## 2020-10-05 PROCEDURE — 81001 URINALYSIS AUTO W/SCOPE: CPT

## 2020-10-05 PROCEDURE — 82043 UR ALBUMIN QUANTITATIVE: CPT

## 2020-10-05 PROCEDURE — 80053 COMPREHEN METABOLIC PANEL: CPT

## 2020-10-05 PROCEDURE — 1101F PT FALLS ASSESS-DOCD LE1/YR: CPT | Performed by: INTERNAL MEDICINE

## 2020-10-05 PROCEDURE — G0439 PPPS, SUBSEQ VISIT: HCPCS | Performed by: INTERNAL MEDICINE

## 2020-10-05 PROCEDURE — G8755 DIAS BP > OR = 90: HCPCS | Performed by: INTERNAL MEDICINE

## 2020-10-05 RX ORDER — CARVEDILOL 12.5 MG/1
12.5 TABLET ORAL 2 TIMES DAILY
Qty: 180 TAB | Refills: 3 | Status: SHIPPED | OUTPATIENT
Start: 2020-10-05 | End: 2021-10-14

## 2020-10-05 NOTE — PATIENT INSTRUCTIONS
Medicare Wellness Visit, Male The best way to live healthy is to have a lifestyle where you eat a well-balanced diet, exercise regularly, limit alcohol use, and quit all forms of tobacco/nicotine, if applicable. Regular preventive services are another way to keep healthy. Preventive services (vaccines, screening tests, monitoring & exams) can help personalize your care plan, which helps you manage your own care. Screening tests can find health problems at the earliest stages, when they are easiest to treat. [facility] follows the current, evidence-based guidelines published by the [facility] Felisha ([facility]) when recommending preventive services for our patients. Because we follow these guidelines, sometimes recommendations change over time as research supports it. (For example, a prostate screening blood test is no longer routinely recommended for men with no symptoms). Of course, you and your doctor may decide to screen more often for some diseases, based on your risk and co-morbidities (chronic disease you are already diagnosed with). Preventive services for you include: - Medicare offers their members a free annual wellness visit, which is time for you and your primary care provider to discuss and plan for your preventive service needs. Take advantage of this benefit every year! 
-All adults over age 72 should receive the recommended pneumonia vaccines. Current USPSTF guidelines recommend a series of two vaccines for the best pneumonia protection.  
-All adults should have a flu vaccine yearly and tetanus vaccine every 10 years. 
-All adults age 48 and older should receive the shingles vaccines (series of two vaccines).       
-All adults age 38-68 who are overweight should have a diabetes screening test once every three years.  
-Other screening tests & preventive services for persons with diabetes include: an eye exam to screen for diabetic retinopathy, a kidney function test, a foot exam, and stricter control over your cholesterol.  
-Cardiovascular screening for adults with routine risk involves an electrocardiogram (ECG) at intervals determined by the provider.  
-Colorectal cancer screening should be done for adults age 54-65 with no increased risk factors for colorectal cancer. There are a number of acceptable methods of screening for this type of cancer. Each test has its own benefits and drawbacks. Discuss with your provider what is most appropriate for you during your annual wellness visit. The different tests include: colonoscopy (considered the best screening method), a fecal occult blood test, a fecal DNA test, and sigmoidoscopy. 
-All adults born between [facility] should be screened once for Hepatitis C. 
-An Abdominal Aortic Aneurysm (AAA) Screening is recommended for men age 73-68 who has ever smoked in their lifetime. Here is a list of your current Health Maintenance items (your personalized list of preventive services) with a due date: 
Health Maintenance Due Topic Date Due  Eye Exam  02/14/1950  Shingles Vaccine (1 of 2) 02/14/1990  Glaucoma Screening   02/14/2005 [facility] Annual Well Visit  05/11/2019 [facility] Diabetic Foot Care  07/19/2019  Albumin Urine Test  04/16/2020  Cholesterol Test   04/16/2020

## 2020-10-05 NOTE — PROGRESS NOTES
The following is a separate encounter visit:    HISTORY OF PRESENT ILLNESS  Julio C Curran is a [de-identified] y.o. male. Visit Vitals  BP (!) 164/92 (BP 1 Location: Left arm, BP Patient Position: Sitting)   Pulse 74   Temp 98.4 °F (36.9 °C) (Oral)   Resp 16   Ht 5' 2\" (1.575 m)   Wt 164 lb (74.4 kg)   SpO2 94%   BMI 30.00 kg/m²       At home before meds:       After meds:       153/84                                 148/85       157/81                                 133/83       156/82                                 146/82       154/80                                 140/80    Hypertension    The history is provided by the patient. This is a chronic problem. The current episode started more than 1 week ago. The problem has not changed since onset. Pertinent negatives include no chest pain, no palpitations and no shortness of breath. Diabetes   Pertinent negatives include no chest pain and no shortness of breath. Review of Systems   Constitutional: Negative. HENT: Negative for congestion and sore throat. Respiratory: Negative for cough and shortness of breath. Cardiovascular: Negative for chest pain, palpitations and leg swelling. Gastrointestinal: Negative. Physical Exam  Vitals signs and nursing note reviewed. Constitutional:       General: He is not in acute distress. Appearance: Normal appearance. He is well-developed. He is not diaphoretic. HENT:      Head: Normocephalic and atraumatic. Cardiovascular:      Rate and Rhythm: Normal rate and regular rhythm. Heart sounds: Murmur present. Crescendo  decrescendo  systolic murmur present with a grade of 3/6. Pulmonary:      Effort: Pulmonary effort is normal.      Breath sounds: Normal breath sounds. Skin:     General: Skin is warm and dry. Neurological:      General: No focal deficit present. Mental Status: He is alert and oriented to person, place, and time.       Comments: Diabetic foot exam:     Left Foot:   Visual Exam: normal    Pulse DP: 2+ (normal)   Filament test: normal sensation    Vibratory sensation: diminished      Right Foot:   Visual Exam: normal    Pulse DP: 2+ (normal)   Filament test: normal sensation    Vibratory sensation: diminished     Psychiatric:         Mood and Affect: Mood normal.         Behavior: Behavior normal.         ASSESSMENT and PLAN    ICD-10-CM ICD-9-CM           2. Type 2 diabetes mellitus with nephropathy (HCC)  J43.91 863.29 METABOLIC PANEL, COMPREHENSIVE     583.81 URINALYSIS W/ RFLX MICROSCOPIC      MICROALBUMIN, UR, RAND W/ MICROALB/CREAT RATIO      HEMOGLOBIN A1C W/O EAG      HM DIABETES FOOT EXAM   3. Essential hypertension  O01 096.5 METABOLIC PANEL, COMPREHENSIVE      carvediloL (COREG) 12.5 mg tablet   4. Hyperlipidemia, unspecified hyperlipidemia type  E78.5 272.4 LIPID PANEL   5. Chronic schizophrenia ([facility]  F20.9 295.62     chronic, stable. Managed by psychiatrist   6. Chronic obstructive pulmonary disease, unspecified COPD type ([facility]  J44.9 496     chronic, stable   7. Chronic systolic congestive heart failure (HCC)  I50.22 428.22      428.0     managed by cardiology   8. Paroxysmal atrial fibrillation (HCC)  I48.0 427.31     managed by cardiology   9. Left adrenal mass (HCC)  E27.8 255.8     felt benign on radiological studies   10. Chronic coronary artery disease  I25.10 414.00 carvediloL (COREG) 12.5 mg tablet       BP up today. As his hear rate is good, will increase the carvedilol to 12.5 mg BID  F/u with cardiology as appt next week    Continue other meds    Update lab today    F/u here 4 months                This is the Subsequent Medicare Annual Wellness Exam, performed 12 months or more after the Initial AWV or the last Subsequent AWV    I have reviewed the patient's medical history in detail and updated the computerized patient record.      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    Swelling of lower extremity M79.89    Hypokalemia E87.6    Visual hallucinations R44.1    Essential hypertension I10    Congestive heart failure ([facility]) I50.9    Chronic schizophrenia ([facility]) F20.9    Chronic kidney disease N18.9    Gout M10.9    Chronic coronary artery disease I25.10    Hematuria R31.9    Type 2 diabetes mellitus without complication, without long-term current use of insulin ([facility]) E11.9    Hyperlipidemia E78.5    Type 2 diabetes mellitus with nephropathy ([facility]) E11.21    Pneumonia due to infectious organism J18.9    Lung nodule R91.1    LVH (left ventricular hypertrophy) I51.7    On amiodarone therapy Z79.899    RBBB I45.10    Paroxysmal atrial fibrillation ([facility]) J73.2    Diastolic dysfunction G46.48    Pulmonary hypertension ([facility]) I27.20    Pulmonic stenosis I37.0    Acute bacterial endocarditis I33.0    Acute on chronic diastolic heart failure ([facility]) I50.33    ENID (acute kidney injury) ([facility]) N17.9    Altered mental status R41.82    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    Hypoxia R09.02    Heart valve disease I38    Gram-positive bacteremia R78.81    GERD (gastroesophageal reflux disease) K21.9    Syncope and collapse R55    SIRS (systemic inflammatory response syndrome) ([facility]) R65.10    Shortness of breath R06.02    Sensory hearing loss H90.5    Retention of urine R33.9    Refusal of blood transfusions as patient is [facility] Z53.1    QT prolongation R94.31    Personal history of other medical treatment Z92.89    Palpitations R00.2    Non-adherence to medical treatment Z91.19     Past Medical History:   Diagnosis Date    Adrenal myelolipoma     Benign non-nodular prostatic hyperplasia with lower urinary tract symptoms     Beta thalassemia trait     CAD (coronary artery disease)     Cardiomyopathy (HCC)     EF 30%    Diabetes mellitus ([facility]     Diabetic nephropathy ([facility]     Diverticulosis     Elevated PSA     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 Faecalis    Enlarged prostate     GERD (gastroesophageal reflux disease)     Gout     Heart failure (HCC)     Hematuria     Hyperlipidemia     Hypertension     Incomplete emptying of bladder     Lacunar infarction (HCC)     left basal ganglia, right cerebellar    Other ill-defined conditions(799.89)     gout    PAF (paroxysmal atrial fibrillation) (HCC)     Pulmonary hypertension (HCC)     Rectal polyp     Right renal mass     Schizophrenia ([facility]     Stopped smoking with greater than 10 pack year history     Vitamin D deficiency     Weight loss, unintentional       Past Surgical History:   Procedure Laterality Date    HX COLONOSCOPY  04/17/2013    Dr. Shona Szymanski    HX ORTHOPAEDIC  5569'I    wound repair L. arm    HX TURP  03/01/2017    [facility]     Current Outpatient Medications   Medication Sig Dispense Refill    carvediloL (COREG) 12.5 mg tablet Take 1 Tab by mouth two (2) times a day. 180 Tab 3    atorvastatin (LIPITOR) 40 mg tablet TAKE 1 TABLET BY MOUTH EVERY DAY 90 Tab 1    potassium chloride SR (KLOR-CON 10) 10 mEq tablet TAKE 1 TABLET BY MOUTH EVERY DAY 90 Tab 1    magnesium oxide (MAG-OX) 400 mg tablet TAKE 1 TABLET BY MOUTH EVERY DAY 90 Tab 1    isosorbide mononitrate ER (IMDUR) 30 mg tablet TAKE 1 TABLET BY MOUTH EVERY DAY.  PLEASE SCHEDULE FOLLOW UP WITH DOCTOR 90 Tab 4    lisinopriL (PRINIVIL, ZESTRIL) 40 mg tablet TAKE 1 TABLET BY MOUTH EVERY DAY 90 Tab 1    aspirin delayed-release 81 mg tablet TAKE 1 TABLET BY MOUTH EVERY DAY 60 Tab 5    Eliquis 5 mg tablet TAKE 1 TABLET BY MOUTH TWICE DAILY 180 Tab 3    amLODIPine (NORVASC) 10 mg tablet TAKE 1 TABLET BY MOUTH DAILY 30 Tab 5    omeprazole (PRILOSEC) 20 mg capsule Take 20 mg by mouth.  nitroglycerin (NITROSTAT) 0.4 mg SL tablet 0.4 mg by SubLINGual route.  amiodarone (CORDARONE) 200 mg tablet Take 0.5 Tabs by mouth daily. 30 Tab 5    OLANZapine (ZYPREXA) 2.5 mg tablet Take 2.5 mg by mouth daily. Take 1 tab PO at bedtime  6    fluticasone (FLOVENT HFA) 44 mcg/actuation inhaler Take  by inhalation. 2 inhalation inhale orally every 12 ours       No Known Allergies    Family History   Problem Relation Age of Onset    Hypertension Mother     Heart Disease Mother     Hypertension Father     Hypertension Brother      Social History     Tobacco Use    Smoking status: Former Smoker     Packs/day: 0.25     Years: 30.00     Pack years: 7.50     Types: Cigarettes    Smokeless tobacco: Never Used   Substance Use Topics    Alcohol use: No       Depression Risk Factor Screening:     3 most recent PHQ Screens 10/5/2020   Little interest or pleasure in doing things Not at all   Feeling down, depressed, irritable, or hopeless Not at all   Total Score PHQ 2 0       Alcohol Risk Screen   Do you average more than 1 drink per night or more than 7 drinks a week: No    In the past three months have you have had more than 4 drinks containing alcohol on one occasion: No        Functional Ability and Level of Safety:   Hearing: mild hearing loss in one ear     Activities of Daily Living: The home contains: grab bars and lives in a senior complex handicapp accessible. Pull cord  Patient does total self care     Ambulation: with mild difficulty     Fall Risk:  Fall Risk Assessment, last 12 mths 10/5/2020   Able to walk? Yes   Fall in past 12 months?  No   Fall with injury? -   Number of falls in past 12 months -   Fall Risk Score -     Abuse Screen:  Patient is not abused       Cognitive Screening   Has your family/caregiver stated any concerns about your memory: no     Cognitive Screening: Normal - Animal Naming Test    Patient Care Team   Patient Care Team:  Mer Sung MD as PCP - General (Internal Medicine)  Mer Sung MD as PCP - [facility]  Merly Lloyd MD as Consulting Provider (Cardiology)  Garret James (Physician Assistant)    Assessment/Plan   Education and counseling provided:  Are appropriate based on today's review and evaluation  Screening Mammography    Diagnoses and all orders for this visit:    1.  Medicare annual wellness visit, subsequent          Health Maintenance Due   Topic Date Due    Eye Exam Retinal or Dilated  02/14/1950    Shingrix Vaccine Age 50> (1 of 2) 02/14/1990    GLAUCOMA SCREENING Q2Y  02/14/2005    Foot Exam Q1  07/19/2019    MICROALBUMIN Q1  04/16/2020    Lipid Screen  04/16/2020

## 2020-10-12 ENCOUNTER — OFFICE VISIT (OUTPATIENT)
Dept: CARDIOLOGY CLINIC | Age: 80
End: 2020-10-12
Payer: MEDICARE

## 2020-10-12 VITALS
SYSTOLIC BLOOD PRESSURE: 180 MMHG | HEART RATE: 70 BPM | BODY MASS INDEX: 30 KG/M2 | WEIGHT: 163 LBS | OXYGEN SATURATION: 90 % | TEMPERATURE: 97.7 F | HEIGHT: 62 IN | DIASTOLIC BLOOD PRESSURE: 86 MMHG

## 2020-10-12 DIAGNOSIS — I25.10 CHRONIC CORONARY ARTERY DISEASE: Chronic | ICD-10-CM

## 2020-10-12 DIAGNOSIS — I50.9 CONGESTIVE HEART FAILURE (HCC): ICD-10-CM

## 2020-10-12 DIAGNOSIS — E78.5 HYPERLIPIDEMIA, UNSPECIFIED HYPERLIPIDEMIA TYPE: Chronic | ICD-10-CM

## 2020-10-12 DIAGNOSIS — I48.0 PAROXYSMAL ATRIAL FIBRILLATION (HCC): Primary | ICD-10-CM

## 2020-10-12 PROCEDURE — 93000 ELECTROCARDIOGRAM COMPLETE: CPT | Performed by: INTERNAL MEDICINE

## 2020-10-12 PROCEDURE — G8754 DIAS BP LESS 90: HCPCS | Performed by: INTERNAL MEDICINE

## 2020-10-12 PROCEDURE — G8417 CALC BMI ABV UP PARAM F/U: HCPCS | Performed by: INTERNAL MEDICINE

## 2020-10-12 PROCEDURE — G8536 NO DOC ELDER MAL SCRN: HCPCS | Performed by: INTERNAL MEDICINE

## 2020-10-12 PROCEDURE — G8753 SYS BP > OR = 140: HCPCS | Performed by: INTERNAL MEDICINE

## 2020-10-12 PROCEDURE — 99214 OFFICE O/P EST MOD 30 MIN: CPT | Performed by: INTERNAL MEDICINE

## 2020-10-12 PROCEDURE — G8427 DOCREV CUR MEDS BY ELIG CLIN: HCPCS | Performed by: INTERNAL MEDICINE

## 2020-10-12 PROCEDURE — G8510 SCR DEP NEG, NO PLAN REQD: HCPCS | Performed by: INTERNAL MEDICINE

## 2020-10-12 RX ORDER — AMLODIPINE BESYLATE 10 MG/1
TABLET ORAL
Qty: 30 TAB | Refills: 5 | Status: SHIPPED | OUTPATIENT
Start: 2020-10-12 | End: 2021-02-10 | Stop reason: SDUPTHER

## 2020-10-12 NOTE — PROGRESS NOTES
Ritika Monterroso presents today for   Chief Complaint   Patient presents with    Follow-up       Ritika Monterroso preferred language for health care discussion is english/other. Is someone accompanying this pt? no    Is the patient using any DME equipment during [address]? no    Depression Screening:  3 most recent PHQ Screens 10/12/2020   Little interest or pleasure in doing things Not at all   Feeling down, depressed, irritable, or hopeless Not at all   Total Score PHQ 2 0       Learning Assessment:  Learning Assessment 6/12/2019   PRIMARY LEARNER Patient   HIGHEST LEVEL OF EDUCATION - PRIMARY LEARNER  -   BARRIERS PRIMARY LEARNER -   CO-LEARNER CAREGIVER -   PRIMARY LANGUAGE ENGLISH   LEARNER PREFERENCE PRIMARY DEMONSTRATION   ANSWERED BY pt   RELATIONSHIP SELF       Abuse Screening:  Abuse Screening Questionnaire 5/10/2018   Do you ever feel afraid of your partner? N   Are you in a relationship with someone who physically or mentally threatens you? N   Is it safe for you to go home? Y       Fall Risk  Fall Risk Assessment, last 12 mths 10/5/2020   Able to walk? Yes   Fall in past 12 months? No   Fall with injury? -   Number of falls in past 12 months -   Fall Risk Score -       Pt currently taking Anticoagulant therapy? no    Coordination of Care:  1. Have you been to the ER, urgent care clinic since your last visit? Hospitalized since your last visit? yes    2. Have you seen or consulted any other health care providers outside of the [address] [place] [address] since your last visit? Include any pap smears or colon screening.  no

## 2020-10-15 RX ORDER — AMIODARONE HYDROCHLORIDE 200 MG/1
100 TABLET ORAL DAILY
Qty: 30 TAB | Refills: 5 | Status: SHIPPED | OUTPATIENT
Start: 2020-10-15 | End: 2021-10-20 | Stop reason: SDUPTHER

## 2020-10-18 NOTE — PROGRESS NOTES
Subjective:      Augustine Castelan is seen today for cardiac re-evaluation. He is a [de-identified] y.o. man with multiple medical problems including coronary artery disease, cardiomyopathy with ejection fraction of 50%, pulmonic stenosis, prior history of endocarditis of the pulmonic valve, paroxysmal atrial fibrillation, pulmonary hypertension, schizophrenia, chronic Amiodarone therapy, hyperlipidemia, diabetes and hypertension. The patient was seen in early 2019 for follow up of  elevated LFTs. The patient is on Amiodarone therapy with a starting date which was not completely clear. He did require a cardioversion  in 2016 which was likely to have been the time he was started on Amiodarone. His Amiodarone was decreased to 100 mg daily at last visit. His LFTs were rechecked and April of this year of 2019. Results are listed below. He is also on statin therapy. In the office today, he reports that he feels \"pretty good \". He has had no palpitations. He has had no PND or orthopnea. He does report some occasional shortness of breath sometimes with conversation. His carvedilol was recently increased by Dr. Jm Welch on a routine visit. His systolic blood pressure was 180 in the office today. He was recently evaluated at [facility] emergency department for epistaxis. He had an area in his left nare that was amenable to cautery. He has had no further epistaxis.       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    Chronic obstructive pulmonary disease ([facility] 11/08/2019    Enlarged prostate 11/08/2019    Fall 11/08/2019    Infective endocarditis 11/08/2019    Hypoxia 11/08/2019    Heart valve disease 11/08/2019    Gram-positive bacteremia 11/08/2019    Shortness of breath 11/08/2019    Sensory hearing loss 11/08/2019    Retention of urine 11/08/2019    Personal history of other medical treatment 11/08/2019    Palpitations 11/08/2019    Pulmonic stenosis 12/28/2018    Pulmonary hypertension (HCC)     LVH (left ventricular hypertrophy) 03/17/2018    On amiodarone therapy 03/17/2018    RBBB 03/17/2018    [facility]ox[facility] fibrillation ([facility] 07/34/2614    Diastolic dysfunction 17/22/9341    Pneumonia due to infectious organism 03/08/2018    Lung nodule 03/08/2018    SIRS (systemic inflammatory response syndrome) ([facility] 02/08/2018    Elevated troponin 02/07/2018    Type 2 diabetes mellitus with nephropathy ([facility] 02/03/2018    Type 2 diabetes mellitus without complication, without long-term current use of insulin ([facility] 01/25/2018    Hyperlipidemia 01/25/2018    Syncope and collapse 07/19/2017    Left adrenal mass ([facility] 06/25/2017    Acute bacterial endocarditis 09/28/2016    ENID (acute kidney injury) ([facility] 09/09/2016    Chronic systolic congestive heart failure ([facility] 02/08/2016    Thalassemia 01/27/2016    Gastroesophageal reflux disease with esophagitis 01/27/2016    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    Swelling of lower extremity 01/27/2016    Hypokalemia 01/27/2016    Visual hallucinations 01/27/2016    Essential hypertension 01/27/2016    Congestive heart failure (HCC) 01/27/2016    Chronic schizophrenia ([facility] 01/27/2016    Chronic kidney disease 01/27/2016    Gout 01/27/2016    Chronic coronary artery disease 01/27/2016    Hematuria 01/27/2016    Acute on chronic diastolic heart failure ([facility] 12/30/2015    Non-adherence to medical treatment 11/26/2015    GERD (gastroesophageal reflux disease) 04/23/2011    QT prolongation 04/23/2011    Refusal of blood transfusions as patient is [employer] 03/17/2011     Current Outpatient Medications   Medication Sig Dispense Refill    amLODIPine (NORVASC) 10 mg tablet TAKE 1 TABLET BY MOUTH DAILY 30 Tab 5    carvediloL (COREG) 12.5 mg tablet Take 1 Tab by mouth two (2) times a day.  180 Tab 3    atorvastatin (LIPITOR) 40 mg tablet TAKE 1 TABLET BY MOUTH EVERY DAY 90 Tab 1    potassium chloride SR (KLOR-CON 10) 10 mEq tablet TAKE 1 TABLET BY MOUTH EVERY DAY 90 Tab 1    magnesium oxide (MAG-OX) 400 mg tablet TAKE 1 TABLET BY MOUTH EVERY DAY 90 Tab 1    isosorbide mononitrate ER (IMDUR) 30 mg tablet TAKE 1 TABLET BY MOUTH EVERY DAY. PLEASE SCHEDULE FOLLOW UP WITH DOCTOR 90 Tab 4    lisinopriL (PRINIVIL, ZESTRIL) 40 mg tablet TAKE 1 TABLET BY MOUTH EVERY DAY 90 Tab 1    aspirin delayed-release 81 mg tablet TAKE 1 TABLET BY MOUTH EVERY DAY 60 Tab 5    Eliquis 5 mg tablet TAKE 1 TABLET BY MOUTH TWICE DAILY 180 Tab 3    omeprazole (PRILOSEC) 20 mg capsule Take 20 mg by mouth.  nitroglycerin (NITROSTAT) 0.4 mg SL tablet 0.4 mg by SubLINGual route.  OLANZapine (ZYPREXA) 2.5 mg tablet Take 2.5 mg by mouth daily. Take 1 tab PO at bedtime  6    fluticasone (FLOVENT HFA) 44 mcg/actuation inhaler Take  by inhalation. 2 inhalation inhale orally every 12 ours      amiodarone (CORDARONE) 200 mg tablet Take 0.5 Tabs by mouth daily. 30 Tab 5     No Known Allergies  Past Medical History:   Diagnosis Date    Adrenal myelolipoma     Benign non-nodular prostatic hyperplasia with lower urinary tract symptoms     Beta thalassemia trait     CAD (coronary artery disease)     Cardiomyopathy (HCC)     EF 30%    Diabetes mellitus ([facility]     Diabetic nephropathy (HCC)     Diverticulosis     Elevated PSA     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  Faecalis    Enlarged prostate     GERD (gastroesophageal reflux disease)     Gout     Heart failure (HCC)     Hematuria     Hyperlipidemia     Hypertension     Incomplete emptying of bladder     Lacunar infarction (HCC)     left basal ganglia, right cerebellar    Other ill-defined conditions(799.89)     gout    PAF (paroxysmal atrial fibrillation) (HCC)     Pulmonary hypertension (HCC)     Rectal polyp     Right renal mass     Schizophrenia ([facility]     Stopped smoking with greater than 10 pack year history     Vitamin D deficiency     Weight loss, unintentional      Past Surgical History:   Procedure Laterality Date    HX COLONOSCOPY  04/17/2013    Dr. Janett Munoz    [address]  [address]    wound repair L. arm    HX TURP  03/01/2017    [facility]     Family History   Problem Relation Age of Onset    Hypertension Mother     Heart Disease Mother     Hypertension Father     Hypertension Brother      Social History     Tobacco Use   Smoking Status Former Smoker    Packs/day: 0.25    Years: 30.00    Pack years: 7.50    Types: Cigarettes   Smokeless Tobacco Never Used          Review of Systems, additional:  Constitutional: negative  Eyes: negative  Respiratory: positive for dyspnea on exertion - improving  Cardiovascular: positive for dyspnea on exertion - improving  Gastrointestinal: negative  Musculoskeletal:negative  Neurological: negative  Behvioral/Psych: negative  Endocrine: negative  ENT: negative    Objective:     Visit Vitals  BP (!) 180/86   Pulse 70   Temp 97.7 °F (36.5 °C) (Temporal)   Ht 5' 2\" (1.575 m)   Wt 163 lb (73.9 kg)   SpO2 90%   BMI 29.81 kg/m²     General:  alert, cooperative, no distress   Chest Wall: inspection normal - no chest wall deformities or tenderness, respiratory effort normal   Lung: clear to auscultation bilaterally   Heart:  normal rate and regular rhythm, S1 and S2 normal, systolic murmur 2/6 at the left second intercostal space border   Abdomen: soft, non-tender. Bowel sounds normal. No masses,  no organomegaly   Extremities: 1+ pitting edema to lower extremities Skin: no rashes   Neuro: alert, oriented, normal speech, no focal findings or movement disorder noted         Assessment/Plan:         ICD-10-CM ICD-9-CM    1. LVH (left ventricular hypertrophy), moderate by recent echo 02/07/2018. Other findings including severely dilated RV and pulmonary hypertension, moderate pulmonic stenosis with last transvalvular velocity in 2016, 3.2 m/s.   Will repeat echocardiogram next visit I51.7 429.3    2. Hyperlipidemia, unspecified hyperlipidemia type E78.5 272.4    3. Chronic coronary artery disease, nuclear stress test done 02/2016; small apical lateral scar with no significant ischemia. I25.10 414.00    4. Congestive heart failure, unspecified congestive heart failure chronicity, unspecified congestive heart failure type (HCC) RT 6 mos I50.9 428.0    5. Essential hypertension, severe elevation of systolic  BP in office today. Blood pressure check in the office in 2 weeks. Return in 6 months I10 401.9    6. Type 2 diabetes mellitus with nephropathy (HCC) E11.21 250.40      583.81    7. Chronic kidney disease, unspecified CKD stage N18.9 585.9    8. Chronic schizophrenia ([facility] F20.9 295.62    9. On amiodarone therapy, taking 100 mg daily. LFTs on 4/16/2019; ALT 54, AST 35, total bilirubin 1.4. Check thyroid functions at that time and chest x-ray Z79.899 V58.69    10. Paroxysmal atrial fibrillation ([facility] S/P cardioversion 1/4/2016, on amiodarone  I48.0 427.31    11. Pulmonic stenosis, arrange for repeat echocardiogram at next visit.

## 2020-10-19 DIAGNOSIS — Z79.899 MEDICATION MANAGEMENT: ICD-10-CM

## 2020-10-19 RX ORDER — LISINOPRIL 40 MG/1
TABLET ORAL
Qty: 90 TAB | Refills: 1 | Status: SHIPPED | OUTPATIENT
Start: 2020-10-19 | End: 2020-10-30 | Stop reason: ALTCHOICE

## 2020-10-30 ENCOUNTER — CLINICAL SUPPORT (OUTPATIENT)
Dept: CARDIOLOGY CLINIC | Age: 80
End: 2020-10-30

## 2020-10-30 VITALS
DIASTOLIC BLOOD PRESSURE: 83 MMHG | TEMPERATURE: 97.8 F | HEART RATE: 67 BPM | OXYGEN SATURATION: 96 % | SYSTOLIC BLOOD PRESSURE: 158 MMHG | RESPIRATION RATE: 16 BRPM

## 2020-10-30 DIAGNOSIS — I27.20 PULMONARY HYPERTENSION (HCC): Primary | ICD-10-CM

## 2020-10-30 DIAGNOSIS — Z79.899 MEDICATION MANAGEMENT: ICD-10-CM

## 2020-10-30 RX ORDER — OLMESARTAN MEDOXOMIL 20 MG/1
20 TABLET ORAL DAILY
Qty: 90 TAB | Refills: 3 | Status: SHIPPED | OUTPATIENT
Start: 2020-10-30 | End: 2020-12-23 | Stop reason: SDUPTHER

## 2020-10-30 NOTE — PROGRESS NOTES
Teresa Monsivais is a [de-identified] y.o. male that is here for a blood pressure check. His current medications are listed below. Current Outpatient Medications   Medication Sig    lisinopriL (PRINIVIL, ZESTRIL) 40 mg tablet TAKE 1 TABLET BY MOUTH EVERY DAY    amiodarone (CORDARONE) 200 mg tablet Take 0.5 Tabs by mouth daily.  amLODIPine (NORVASC) 10 mg tablet TAKE 1 TABLET BY MOUTH DAILY    carvediloL (COREG) 12.5 mg tablet Take 1 Tab by mouth two (2) times a day.  atorvastatin (LIPITOR) 40 mg tablet TAKE 1 TABLET BY MOUTH EVERY DAY    potassium chloride SR (KLOR-CON 10) 10 mEq tablet TAKE 1 TABLET BY MOUTH EVERY DAY    magnesium oxide (MAG-OX) 400 mg tablet TAKE 1 TABLET BY MOUTH EVERY DAY    isosorbide mononitrate ER (IMDUR) 30 mg tablet TAKE 1 TABLET BY MOUTH EVERY DAY. PLEASE SCHEDULE FOLLOW UP WITH DOCTOR    aspirin delayed-release 81 mg tablet TAKE 1 TABLET BY MOUTH EVERY DAY    Eliquis 5 mg tablet TAKE 1 TABLET BY MOUTH TWICE DAILY    omeprazole (PRILOSEC) 20 mg capsule Take 20 mg by mouth.  nitroglycerin (NITROSTAT) 0.4 mg SL tablet 0.4 mg by SubLINGual route.  OLANZapine (ZYPREXA) 2.5 mg tablet Take 2.5 mg by mouth daily. Take 1 tab PO at bedtime    fluticasone (FLOVENT HFA) 44 mcg/actuation inhaler Take  by inhalation. 2 inhalation inhale orally every 12 ours     No current facility-administered medications for this visit. His   Visit Vitals  BP (!) 158/83 (BP 1 Location: Left arm, BP Patient Position: Sitting)   Pulse 67   Temp 97.8 °F (36.6 °C) (Temporal)   Resp 16   SpO2 96%     Verbal order and read back per Dr. Dylon Grady. D/C Lisinopril  START Benicar (olmesartan) 20 mg daily     Keep daily BP log, call the office if BP >160/>80 for 2 times in a row. Return in 2 weeks for BP check.

## 2020-11-13 ENCOUNTER — CLINICAL SUPPORT (OUTPATIENT)
Dept: CARDIOLOGY CLINIC | Age: 80
End: 2020-11-13

## 2020-11-13 VITALS — SYSTOLIC BLOOD PRESSURE: 154 MMHG | DIASTOLIC BLOOD PRESSURE: 81 MMHG | HEART RATE: 71 BPM

## 2020-11-13 DIAGNOSIS — I15.9 SECONDARY HYPERTENSION: Primary | ICD-10-CM

## 2020-11-13 NOTE — PROGRESS NOTES
Steph Lennon is a [de-identified] y.o. male that is here for a blood pressure check. His current medications are listed below. Current Outpatient Medications   Medication Sig    olmesartan (BENICAR) 20 mg tablet Take 1 Tab by mouth daily.  amiodarone (CORDARONE) 200 mg tablet Take 0.5 Tabs by mouth daily.  amLODIPine (NORVASC) 10 mg tablet TAKE 1 TABLET BY MOUTH DAILY    carvediloL (COREG) 12.5 mg tablet Take 1 Tab by mouth two (2) times a day.  atorvastatin (LIPITOR) 40 mg tablet TAKE 1 TABLET BY MOUTH EVERY DAY    potassium chloride SR (KLOR-CON 10) 10 mEq tablet TAKE 1 TABLET BY MOUTH EVERY DAY    magnesium oxide (MAG-OX) 400 mg tablet TAKE 1 TABLET BY MOUTH EVERY DAY    isosorbide mononitrate ER (IMDUR) 30 mg tablet TAKE 1 TABLET BY MOUTH EVERY DAY. PLEASE SCHEDULE FOLLOW UP WITH DOCTOR    aspirin delayed-release 81 mg tablet TAKE 1 TABLET BY MOUTH EVERY DAY    Eliquis 5 mg tablet TAKE 1 TABLET BY MOUTH TWICE DAILY    omeprazole (PRILOSEC) 20 mg capsule Take 20 mg by mouth.  nitroglycerin (NITROSTAT) 0.4 mg SL tablet 0.4 mg by SubLINGual route.  OLANZapine (ZYPREXA) 2.5 mg tablet Take 2.5 mg by mouth daily. Take 1 tab PO at bedtime    fluticasone (FLOVENT HFA) 44 mcg/actuation inhaler Take  by inhalation. 2 inhalation inhale orally every 12 ours     No current facility-administered medications for this visit.                 His   Visit Vitals  BP (!) 154/81   Pulse 71

## 2020-11-19 ENCOUNTER — TELEPHONE (OUTPATIENT)
Dept: CARDIOLOGY CLINIC | Age: 80
End: 2020-11-19

## 2020-11-19 NOTE — TELEPHONE ENCOUNTER
Verbal order and read back per Chidi Hobbs MD  After BP check review -Increase Benicar to 40 mg daily

## 2020-11-19 NOTE — TELEPHONE ENCOUNTER
Contacted pt at [facility] number. Two patient Identifiers confirmed. Advised pt per Dr Bob Esqueda. Pt verbalized understanding.

## 2020-12-23 NOTE — TELEPHONE ENCOUNTER
PCP: Umu Roberts MD    Last appt: 11/13/2020  Future Appointments   Date Time Provider Solitario Fuchs   2/8/2021  2:00 PM Umu Roberts MD [facility] AMB       Requested Prescriptions     Pending Prescriptions Disp Refills    olmesartan (BENICAR) 20 mg tablet 90 Tab 3     Sig: Take 1 Tab by mouth daily.

## 2020-12-23 NOTE — TELEPHONE ENCOUNTER
Requested Prescriptions     Pending Prescriptions Disp Refills    olmesartan (BENICAR) 20 mg tablet 90 Tab 3     Sig: Take 1 Tab by mouth daily.        Patient taking 2 tabs per day

## 2020-12-25 RX ORDER — OLMESARTAN MEDOXOMIL 20 MG/1
20 TABLET ORAL DAILY
Qty: 90 TAB | Refills: 3 | Status: SHIPPED | OUTPATIENT
Start: 2020-12-25 | End: 2020-12-28

## 2020-12-28 NOTE — TELEPHONE ENCOUNTER
PCP: Ana Luisa Ronquillo MD    Last appt: 11/13/2020  Future Appointments   Date Time Provider Solitario Fuchs   2/8/2021  2:00 PM Ana Luisa Ronquillo MD [facility] AM[facility]       Requested Prescriptions     Pending Prescriptions Disp Refills    olmesartan (BENICAR) 20 mg tablet 180 Tab 3     Sig: Take 2 Tabs by mouth daily. Request for a 30 or 90 day supply? Provider Discretion    Pharmacy: Confirmed    Other Comments:  Medication refill request via phone. Updated prescription was not sent to pharmacy.

## 2020-12-28 NOTE — TELEPHONE ENCOUNTER
Patient contacted office to check the status of the medication refill request. Patient stated that he is out of medication. Please advise.  Thank you

## 2020-12-29 ENCOUNTER — TELEPHONE (OUTPATIENT)
Dept: CARDIOLOGY CLINIC | Age: 80
End: 2020-12-29

## 2020-12-29 RX ORDER — OLMESARTAN MEDOXOMIL 20 MG/1
40 TABLET ORAL DAILY
Qty: 180 TAB | Refills: 3 | Status: SHIPPED | OUTPATIENT
Start: 2020-12-29 | End: 2021-02-10 | Stop reason: DRUGHIGH

## 2020-12-29 NOTE — TELEPHONE ENCOUNTER
Torie Tripathi, [address] [place] [address] pharmacist, contacted the office and stated that the insurance will cover the increase in Benicar if it is 40mg tablet. Verbal order was provided to change the medication to 40 mg tablets with read back. Pharmacist verbalized understanding and tolerated well. Encounter closed.

## 2020-12-30 ENCOUNTER — TELEPHONE (OUTPATIENT)
Dept: INTERNAL MEDICINE CLINIC | Age: 80
End: 2020-12-30

## 2021-01-19 DIAGNOSIS — E87.6 HYPOKALEMIA: ICD-10-CM

## 2021-01-19 DIAGNOSIS — Z79.899 MEDICATION MANAGEMENT: ICD-10-CM

## 2021-01-19 RX ORDER — LANOLIN ALCOHOL/MO/W.PET/CERES
CREAM (GRAM) TOPICAL
Qty: 90 TAB | Refills: 1 | Status: SHIPPED | OUTPATIENT
Start: 2021-01-19 | End: 2021-07-27

## 2021-02-06 DIAGNOSIS — Z79.899 MEDICATION MANAGEMENT: ICD-10-CM

## 2021-02-07 RX ORDER — POTASSIUM CHLORIDE 750 MG/1
TABLET, FILM COATED, EXTENDED RELEASE ORAL
Qty: 90 TAB | Refills: 1 | Status: SHIPPED | OUTPATIENT
Start: 2021-02-07 | End: 2021-08-06

## 2021-02-08 DIAGNOSIS — Z23 ENCOUNTER FOR IMMUNIZATION: Primary | ICD-10-CM

## 2021-02-10 ENCOUNTER — DOCUMENTATION ONLY (OUTPATIENT)
Dept: INTERNAL MEDICINE CLINIC | Age: 81
End: 2021-02-10

## 2021-02-10 ENCOUNTER — HOSPITAL ENCOUNTER (OUTPATIENT)
Dept: LAB | Age: 81
Discharge: HOME | End: 2021-02-10
Payer: MEDICARE

## 2021-02-10 ENCOUNTER — OFFICE VISIT (OUTPATIENT)
Dept: INTERNAL MEDICINE CLINIC | Age: 81
End: 2021-02-10
Payer: MEDICARE

## 2021-02-10 ENCOUNTER — IMMUNIZATION (OUTPATIENT)
Dept: INTERNAL MEDICINE CLINIC | Age: 81
End: 2021-02-10

## 2021-02-10 VITALS
HEIGHT: 62 IN | SYSTOLIC BLOOD PRESSURE: 149 MMHG | WEIGHT: 161 LBS | TEMPERATURE: 97.9 F | OXYGEN SATURATION: 91 % | RESPIRATION RATE: 20 BRPM | DIASTOLIC BLOOD PRESSURE: 83 MMHG | BODY MASS INDEX: 29.63 KG/M2 | HEART RATE: 55 BPM

## 2021-02-10 DIAGNOSIS — E11.21 TYPE 2 DIABETES MELLITUS WITH NEPHROPATHY (HCC): Primary | ICD-10-CM

## 2021-02-10 DIAGNOSIS — E27.8 LEFT ADRENAL MASS (HCC): ICD-10-CM

## 2021-02-10 DIAGNOSIS — F20.9 CHRONIC SCHIZOPHRENIA (HCC): ICD-10-CM

## 2021-02-10 DIAGNOSIS — J44.9 CHRONIC OBSTRUCTIVE PULMONARY DISEASE, UNSPECIFIED COPD TYPE (HCC): ICD-10-CM

## 2021-02-10 DIAGNOSIS — I10 ESSENTIAL HYPERTENSION: ICD-10-CM

## 2021-02-10 DIAGNOSIS — I50.9 CONGESTIVE HEART FAILURE, UNSPECIFIED HF CHRONICITY, UNSPECIFIED HEART FAILURE TYPE (HCC): ICD-10-CM

## 2021-02-10 DIAGNOSIS — E78.5 HYPERLIPIDEMIA, UNSPECIFIED HYPERLIPIDEMIA TYPE: Chronic | ICD-10-CM

## 2021-02-10 DIAGNOSIS — E11.21 TYPE 2 DIABETES MELLITUS WITH NEPHROPATHY (HCC): ICD-10-CM

## 2021-02-10 DIAGNOSIS — I48.0 PAROXYSMAL ATRIAL FIBRILLATION (HCC): ICD-10-CM

## 2021-02-10 DIAGNOSIS — I25.10 CHRONIC CORONARY ARTERY DISEASE: Chronic | ICD-10-CM

## 2021-02-10 LAB — HBA1C MFR BLD: 5.6 % (ref 4.2–5.6)

## 2021-02-10 PROCEDURE — G8427 DOCREV CUR MEDS BY ELIG CLIN: HCPCS | Performed by: INTERNAL MEDICINE

## 2021-02-10 PROCEDURE — 99214 OFFICE O/P EST MOD 30 MIN: CPT | Performed by: INTERNAL MEDICINE

## 2021-02-10 PROCEDURE — G8753 SYS BP > OR = 140: HCPCS | Performed by: INTERNAL MEDICINE

## 2021-02-10 PROCEDURE — G8510 SCR DEP NEG, NO PLAN REQD: HCPCS | Performed by: INTERNAL MEDICINE

## 2021-02-10 PROCEDURE — G8754 DIAS BP LESS 90: HCPCS | Performed by: INTERNAL MEDICINE

## 2021-02-10 PROCEDURE — 0011A COVID-19, MRNA, LNP-S, PF, 100MCG/0.5ML DOSE(MODERNA): CPT | Performed by: FAMILY MEDICINE

## 2021-02-10 PROCEDURE — G8417 CALC BMI ABV UP PARAM F/U: HCPCS | Performed by: INTERNAL MEDICINE

## 2021-02-10 PROCEDURE — 91301 COVID-19, MRNA, LNP-S, PF, 100MCG/0.5ML DOSE(MODERNA): CPT | Performed by: FAMILY MEDICINE

## 2021-02-10 PROCEDURE — 1101F PT FALLS ASSESS-DOCD LE1/YR: CPT | Performed by: INTERNAL MEDICINE

## 2021-02-10 PROCEDURE — 83036 HEMOGLOBIN GLYCOSYLATED A1C: CPT

## 2021-02-10 PROCEDURE — G8536 NO DOC ELDER MAL SCRN: HCPCS | Performed by: INTERNAL MEDICINE

## 2021-02-10 RX ORDER — ATORVASTATIN CALCIUM 40 MG/1
TABLET, FILM COATED ORAL
Qty: 90 TAB | Refills: 4 | Status: SHIPPED | OUTPATIENT
Start: 2021-02-10 | End: 2022-03-09 | Stop reason: SDUPTHER

## 2021-02-10 RX ORDER — OLANZAPINE 5 MG/1
5 TABLET ORAL
COMMUNITY

## 2021-02-10 RX ORDER — OLMESARTAN MEDOXOMIL 40 MG/1
40 TABLET ORAL DAILY
COMMUNITY
End: 2021-12-28

## 2021-02-10 RX ORDER — AMLODIPINE BESYLATE 10 MG/1
TABLET ORAL
Qty: 90 TAB | Refills: 3 | Status: SHIPPED | OUTPATIENT
Start: 2021-02-10 | End: 2022-03-09 | Stop reason: SDUPTHER

## 2021-02-10 NOTE — PROGRESS NOTES
HISTORY OF PRESENT ILLNESS  Tyler Lara is a [de-identified] y.o. male. BP (!) 149/83 (BP 1 Location: Right upper arm)   Pulse (!) 55   Temp 97.9 °F (36.6 °C) (Temporal)   Resp 20   Ht 5' 2\" (1.575 m)   Wt 161 lb (73 kg)   SpO2 91%   BMI 29.45 kg/m²     Hypertension   The history is provided by the patient. This is a chronic problem. The current episode started more than 1 week ago. The problem has been gradually improving. Pertinent negatives include no chest pain, no palpitations, no headaches, no dizziness and no shortness of breath. Risk factors include dyslipidemia and diabetes mellitus. Diabetes  The history is provided by the patient (stable, nml HBA1c on diet only). This is a chronic problem. The current episode started more than 1 week ago. Pertinent negatives include no chest pain, no headaches and no shortness of breath. Cholesterol Problem  Pertinent negatives include no chest pain, no headaches and no shortness of breath. Review of Systems   Constitutional: Negative for chills and fever. Respiratory: Negative for shortness of breath. Cardiovascular: Negative for chest pain and palpitations. Neurological: Negative for dizziness and headaches. Physical Exam  Vitals signs and nursing note reviewed. Constitutional:       General: He is not in acute distress. Appearance: Normal appearance. He is well-developed. He is not diaphoretic. Cardiovascular:      Rate and Rhythm: Normal rate and regular rhythm. Pulmonary:      Effort: Pulmonary effort is normal.      Breath sounds: Normal breath sounds. Musculoskeletal:      Right lower leg: No edema. Left lower leg: No edema. Skin:     General: Skin is warm and dry. Neurological:      Mental Status: He is alert and oriented to person, place, and time. Psychiatric:         Mood and Affect: Mood normal.         Behavior: Behavior normal.         ASSESSMENT and PLAN    ICD-10-CM ICD-9-CM    1.  Type 2 diabetes mellitus with nephropathy ([facility]  N05.74 286.65 METABOLIC PANEL, COMPREHENSIVE     583.81 HEMOGLOBIN A1C W/O EAG   2. Essential hypertension  I10 401.9    3. Hyperlipidemia, unspecified hyperlipidemia type  E78.5 272.4 atorvastatin (LIPITOR) 40 mg tablet      amLODIPine (NORVASC) 10 mg tablet      LIPID PANEL   4. Chronic coronary artery disease  I25.10 414.00 atorvastatin (LIPITOR) 40 mg tablet      amLODIPine (NORVASC) 10 mg tablet   5. Paroxysmal atrial fibrillation (HCC)  I48.0 427.31     managed by cardiology   6. Congestive heart failure, unspecified HF chronicity, unspecified heart failure type (HCC)  I50.9 428.0 atorvastatin (LIPITOR) 40 mg tablet    managed by cardiology   7. Chronic obstructive pulmonary disease, unspecified COPD type ([place] [address].)  J44.9 496    8. Chronic schizophrenia ([facility]  F20.9 295.62    9. Left adrenal mass (HCC)  E27.8 255.8     inactive  Stable as of 2017 after several checks         BP coming down.  Not yet fully at goal    DM controlled on diet alone    PAF--followed by cardiology    F/u 6 months for HTN, DM, chol

## 2021-02-10 NOTE — PROGRESS NOTES
Heena Encarnacion is a [de-identified] y.o. male who presents for routine immunizations. He denies any symptoms , reactions or allergies that would exclude them from being immunized today. Risks and adverse reactions were discussed and the VIS was given to them. All questions were addressed. He was observed for 15 min post injection. There were no reactions observed. Bethany Landers LPN

## 2021-02-10 NOTE — PROGRESS NOTES
ROOM # 4  Identified pt with two pt identifiers(name and ). Reviewed record in preparation for visit and have obtained necessary documentation. Chief Complaint   Patient presents with    Hypertension     3 month f/i     Diabetes     3 month fu    Cholesterol Problem     3 month fu       Requested Prescriptions     Pending Prescriptions Disp Refills    atorvastatin (LIPITOR) 40 mg tablet 90 Tab 1     Sig: TAKE 1 TABLET BY MOUTH EVERY DAY       Mike Kessler preferred language for health care discussion is english/other. Is the patient using any DME equipment during OV? Sedrick Mccarty is due for:  Health Maintenance Due   Topic    Eye Exam Retinal or Dilated     Shingrix Vaccine Age 50> (1 of 2)    GLAUCOMA SCREENING Q2Y      Health Maintenance reviewed and discussed per provider  Please order/place referral if appropriate. Advance Directive:  1. Do you have an advance directive in place? Patient Reply: NO    2. If not, would you like material regarding how to put one in place? NO    Coordination of Care:  1. Have you been to the ER, urgent care clinic since your last visit? Hospitalized since your last visit? NO    2. Have you seen or consulted any other health care providers outside of the [address] [place] [address] since your last visit? Include any pap smears or colon screening. NO    Patient is accompanied by self I have received verbal consent from Mike Kessler to discuss any/all medical information while they are present in the room.     Learning Assessment:  Learning Assessment 2019   PRIMARY LEARNER Patient Patient   HIGHEST LEVEL OF EDUCATION - PRIMARY LEARNER  - DID NOT GRADUATE HIGH SCHOOL   BARRIERS PRIMARY LEARNER - NONE   CO-LEARNER CAREGIVER - No   PRIMARY LANGUAGE ENGLISH ENGLISH   LEARNER PREFERENCE PRIMARY DEMONSTRATION VIDEOS   ANSWERED BY pt patient   RELATIONSHIP SELF SELF     Depression Screening:  3 most recent [facility] Screens 2/10/2021 2020 10/12/2020 10/5/2020 7/19/2018 5/10/2018 4/5/2018   Little interest or pleasure in doing things Not at all Not at all Not at all Not at all Not at all Not at all Not at all   Feeling down, depressed, irritable, or hopeless Not at all Not at all Not at all Not at all Not at all Not at all Not at all   Total Score PHQ 2 0 0 0 0 0 0 0     Abuse Screening:  Abuse Screening Questionnaire 5/10/2018 4/5/2018 3/8/2018   Do you ever feel afraid of your partner? N N N   Are you in a relationship with someone who physically or mentally threatens you? N N N   Is it safe for you to go home? Etienne Martinez     Fall Risk  Fall Risk Assessment, last 12 mths 11/13/2020 10/5/2020 6/12/2019 7/19/2018 5/10/2018 4/5/2018 3/8/2018   Able to walk? Yes Yes Yes Yes Yes Yes Yes   Fall in past 12 months? No No No No No No No   Number of falls in past 12 months - - - - - - -   Fall with injury? - - - - - - -     Recent Travel Screening and Travel History documentation     Travel Screening     Question   Response    In the last month, have you been in contact with someone who was confirmed or suspected to have Coronavirus / COVID-19? No / Unsure    Have you had a COVID-19 viral test in the last 14 days? No    Do you have any of the following new or worsening symptoms? None of these    Have you traveled internationally in the last month?   No      Travel History   Travel since 01/10/21     No documented travel since 01/10/21

## 2021-02-24 ENCOUNTER — HOSPITAL ENCOUNTER (OUTPATIENT)
Dept: LAB | Age: 81
Discharge: HOME | End: 2021-02-24
Payer: MEDICARE

## 2021-02-24 DIAGNOSIS — E78.5 HYPERLIPIDEMIA, UNSPECIFIED HYPERLIPIDEMIA TYPE: Chronic | ICD-10-CM

## 2021-02-24 DIAGNOSIS — I10 ESSENTIAL HYPERTENSION: Chronic | ICD-10-CM

## 2021-02-24 DIAGNOSIS — I10 ESSENTIAL HYPERTENSION: Primary | Chronic | ICD-10-CM

## 2021-02-24 LAB
ALBUMIN SERPL-MCNC: 4.1 G/DL (ref 3.4–5)
ALBUMIN/GLOB SERPL: 1.1 {RATIO} (ref 0.8–1.7)
ALP SERPL-CCNC: 143 U/L (ref 45–117)
ALT SERPL-CCNC: 47 U/L (ref 16–61)
ANION GAP SERPL CALC-SCNC: 5 MMOL/L (ref 3–18)
AST SERPL-CCNC: 30 U/L (ref 10–38)
BILIRUB SERPL-MCNC: 1.7 MG/DL (ref 0.2–1)
BUN SERPL-MCNC: 18 MG/DL (ref 7–18)
BUN/CREAT SERPL: 13 (ref 12–20)
CALCIUM SERPL-MCNC: 9 MG/DL (ref 8.5–10.1)
CHLORIDE SERPL-SCNC: 109 MMOL/L (ref 100–111)
CHOLEST SERPL-MCNC: 116 MG/DL
CO2 SERPL-SCNC: 28 MMOL/L (ref 21–32)
CREAT SERPL-MCNC: 1.44 MG/DL (ref 0.6–1.3)
GLOBULIN SER CALC-MCNC: 3.6 G/DL (ref 2–4)
GLUCOSE SERPL-MCNC: 99 MG/DL (ref 74–99)
HDLC SERPL-MCNC: 53 MG/DL (ref 40–60)
HDLC SERPL: 2.2 {RATIO} (ref 0–5)
LDLC SERPL CALC-MCNC: 52.2 MG/DL (ref 0–100)
LIPID PROFILE,FLP: NORMAL
POTASSIUM SERPL-SCNC: 3.8 MMOL/L (ref 3.5–5.5)
PROT SERPL-MCNC: 7.7 G/DL (ref 6.4–8.2)
SODIUM SERPL-SCNC: 142 MMOL/L (ref 136–145)
TRIGL SERPL-MCNC: 54 MG/DL (ref ?–150)
VLDLC SERPL CALC-MCNC: 10.8 MG/DL

## 2021-02-24 PROCEDURE — 80061 LIPID PANEL: CPT

## 2021-02-24 PROCEDURE — 80053 COMPREHEN METABOLIC PANEL: CPT

## 2021-02-24 PROCEDURE — 36415 COLL VENOUS BLD VENIPUNCTURE: CPT

## 2021-03-04 DIAGNOSIS — Z23 ENCOUNTER FOR IMMUNIZATION: Primary | ICD-10-CM

## 2021-03-10 ENCOUNTER — DOCUMENTATION ONLY (OUTPATIENT)
Dept: INTERNAL MEDICINE CLINIC | Age: 81
End: 2021-03-10

## 2021-03-10 ENCOUNTER — IMMUNIZATION (OUTPATIENT)
Dept: INTERNAL MEDICINE CLINIC | Age: 81
End: 2021-03-10
Payer: MEDICARE

## 2021-03-10 DIAGNOSIS — Z23 ENCOUNTER FOR IMMUNIZATION: ICD-10-CM

## 2021-03-10 PROCEDURE — 0012A COVID-19, MRNA, LNP-S, PF, 100MCG/0.5ML DOSE(MODERNA): CPT | Performed by: FAMILY MEDICINE

## 2021-03-10 PROCEDURE — 91301 COVID-19, MRNA, LNP-S, PF, 100MCG/0.5ML DOSE(MODERNA): CPT | Performed by: FAMILY MEDICINE

## 2021-03-10 NOTE — PROGRESS NOTES
Spencer Black is a 80 y.o. male who presents for   Chief Complaint   Patient presents with    Immunization/Injection     2nd covid [address] [facility] vaccination   ,  He denies any symptoms , reactions or allergies that would exclude them from being immunized today. Risks and adverse reactions were discussed and the VIS was given to them. All questions were addressed. He was observed for 15 min post injection. There were no reactions observed.     Janet Villarreal LPN

## 2021-03-24 DIAGNOSIS — E78.5 HYPERLIPIDEMIA, UNSPECIFIED HYPERLIPIDEMIA TYPE: Chronic | ICD-10-CM

## 2021-03-24 DIAGNOSIS — I25.10 CHRONIC CORONARY ARTERY DISEASE: Chronic | ICD-10-CM

## 2021-03-24 RX ORDER — ASPIRIN 81 MG/1
TABLET ORAL
Qty: 90 TAB | Refills: 3 | Status: SHIPPED | OUTPATIENT
Start: 2021-03-24 | End: 2022-03-09 | Stop reason: SDUPTHER

## 2021-03-29 RX ORDER — APIXABAN 5 MG/1
TABLET, FILM COATED ORAL
Qty: 180 TAB | Refills: 3 | Status: SHIPPED | OUTPATIENT
Start: 2021-03-29 | End: 2022-02-16

## 2021-05-10 ENCOUNTER — TELEPHONE (OUTPATIENT)
Dept: INTERNAL MEDICINE CLINIC | Age: 81
End: 2021-05-10

## 2021-05-10 DIAGNOSIS — J96.11 CHRONIC HYPOXEMIC RESPIRATORY FAILURE (HCC): Primary | ICD-10-CM

## 2021-05-10 NOTE — TELEPHONE ENCOUNTER
Patient needs an appointment for post hospitalization, but can't come in because his current oxygen tank is only good for one hour. Please order a concentrator for patient so he can come in.

## 2021-05-10 NOTE — TELEPHONE ENCOUNTER
Called pt and left message. Call back number left and will attempt to contact again. The call was r/t recent discharge and follow up.

## 2021-05-10 NOTE — TELEPHONE ENCOUNTER
Reviewed Chart regarding oxygen request.   Inocencio Gary RN with [employer] and left message. Call back number left and I myself or one of the other nurses will attempt to contact again. The call was to inform that pt was prescribed during Discharge O2 Therapy and rate of 4 LPM  Continuously.  :  Discharge: Home Oxygen. .. Continuous Humidified Oxygen (Will Include a Home Portable Unit)   Provide gaseous stationary concentrator and/or portable unit. Evaluate for conserving device. Evaluate for home fill. Length of need = 99 months.     Per Sacha Brown MD  5/8/2021 10:46 PM

## 2021-05-11 NOTE — TELEPHONE ENCOUNTER
Gloria Justin with [facility] is returning Merly's call. States the patient sotelo not want a regular O2 tank to lug around. He wants the small, lightweight O2 concentrator.     Gloria Justin [phone] [address] [facility]

## 2021-05-12 NOTE — TELEPHONE ENCOUNTER
Called and left voicemail for Rob Miller, RN with [employer] and left message. Call back number left and I myself or one of the other nurses will attempt to contact again. The call was to advise that Dr. Melania Stovall will send a referral to Jeffery Garza for follow up on this matter.

## 2021-06-01 DIAGNOSIS — Z76.0 MEDICATION REFILL: Primary | ICD-10-CM

## 2021-06-01 RX ORDER — TAMSULOSIN HYDROCHLORIDE 0.4 MG/1
0.4 CAPSULE ORAL
COMMUNITY
Start: 2021-05-08 | End: 2021-06-01 | Stop reason: SDUPTHER

## 2021-06-01 RX ORDER — FUROSEMIDE 20 MG/1
20 TABLET ORAL DAILY
Qty: 90 TABLET | Refills: 1 | Status: SHIPPED | OUTPATIENT
Start: 2021-06-01 | End: 2021-11-22

## 2021-06-01 RX ORDER — FUROSEMIDE 20 MG/1
20 TABLET ORAL DAILY
COMMUNITY
Start: 2021-05-08 | End: 2021-06-01 | Stop reason: SDUPTHER

## 2021-06-01 RX ORDER — TAMSULOSIN HYDROCHLORIDE 0.4 MG/1
0.4 CAPSULE ORAL DAILY
Qty: 90 CAPSULE | Refills: 1 | Status: SHIPPED | OUTPATIENT
Start: 2021-06-01 | End: 2021-11-28

## 2021-06-01 RX ORDER — FINASTERIDE 5 MG/1
5 TABLET, FILM COATED ORAL DAILY
Qty: 90 TABLET | Refills: 1 | Status: SHIPPED | OUTPATIENT
Start: 2021-06-01 | End: 2021-11-30

## 2021-06-01 RX ORDER — FINASTERIDE 5 MG/1
5 TABLET, FILM COATED ORAL DAILY
COMMUNITY
Start: 2021-05-09 | End: 2021-06-01 | Stop reason: SDUPTHER

## 2021-06-01 NOTE — TELEPHONE ENCOUNTER
Patient is calling in to get refills on the medication he was put on in the hospital.  States he has not followed up with you since he was out of the hospital since he has no way to get here. States he is almost out of the following medications. Finasteride 5mg tabs 1 a day.     Tamsulosin HCL 4mg caps 1 a day    Furosemide 20mg tabs 1 a day

## 2021-07-07 ENCOUNTER — DOCUMENTATION ONLY (OUTPATIENT)
Dept: PULMONOLOGY | Age: 81
End: 2021-07-07

## 2021-07-07 NOTE — PROGRESS NOTES
Giuseppe vm to sched np appt per  [facility] Seth/Chronic hypoxemic respiratory failure

## 2021-07-16 ENCOUNTER — PATIENT OUTREACH (OUTPATIENT)
Dept: CASE MANAGEMENT | Age: 81
End: 2021-07-16

## 2021-07-16 NOTE — PROGRESS NOTES
Complex Case Management      Date/Time:  2021 12:28 PM    Method of communication with patient:phone    [facility] ([facility]) contacted the patient by telephone to perform Ambulatory Care Coordination. Verified name and  (PHI) with patient as identifiers. Provided introduction to self, and explanation of the Ambulatory Care Manager's role. Reviewed most recent clinic visit w/ patient who verbalized understanding. Patient given an opportunity to ask questions. Top Challenges reviewed with the patient   1. States he does not have glucometer to check BS  2. Reports that his sister will be taking him to PCP appointment next week     The patient agrees to contact the PCP office or the [facility] for questions related to their healthcare. Provided contact information for future reference. Disease Specific:   CHF - weight - 150 lbs. Denies edema, chest pain. Reports occasional dry cough and shortness of breath noted with exertion. Home Health Active: No    DME Active: Yes    Barriers to care? Support system, Sister takes him to appointments occasionally, daughter lives in [place]. Y.   Utilization of services - states he needs assistance with light housekeeping, [facility] will research agencies (states daughter is willing to pay for assistance). Advance Care Planning:   Does patient have an Advance Directive:  not on file states he has 'policy' which states his wishes, instructed to take with him to PCP appointment to be entered into EMR. Medication(s):   Medication reconciliation was performed with patient, who verbalizes understanding of administration of home medications. There were no barriers to obtaining medications identified at this time. Referral to Pharm D needed: no     Current Outpatient Medications   Medication Sig    Oxygen 3LPM continuous    finasteride (PROSCAR) 5 mg tablet Take 1 Tablet by mouth daily.     tamsulosin (FLOMAX) 0.4 mg capsule Take 1 Capsule by mouth daily.    aspirin delayed-release 81 mg tablet TAKE 1 TABLET BY MOUTH EVERY DAY    olmesartan (BENICAR) 40 mg tablet Take 40 mg by mouth daily. Indications: high blood pressure    OLANZapine (ZyPREXA) 5 mg tablet Take 5 mg by mouth nightly.  atorvastatin (LIPITOR) 40 mg tablet TAKE 1 TABLET BY MOUTH EVERY DAY    amLODIPine (NORVASC) 10 mg tablet TAKE 1 TABLET BY MOUTH DAILY    potassium chloride SR (KLOR-CON 10) 10 mEq tablet TAKE 1 TABLET BY MOUTH EVERY DAY    magnesium oxide (MAG-OX) 400 mg tablet TAKE 1 TABLET BY MOUTH EVERY DAY    amiodarone (CORDARONE) 200 mg tablet Take 0.5 Tabs by mouth daily.  carvediloL (COREG) 12.5 mg tablet Take 1 Tab by mouth two (2) times a day.  isosorbide mononitrate ER (IMDUR) 30 mg tablet TAKE 1 TABLET BY MOUTH EVERY DAY. PLEASE SCHEDULE FOLLOW UP WITH DOCTOR    OLANZapine (ZYPREXA) 2.5 mg tablet Take 2.5 mg by mouth daily. Take 1 tab PO at bedtime    furosemide (LASIX) 20 mg tablet Take 1 Tablet by mouth daily. (Patient not taking: Reported on 7/16/2021)    Eliquis 5 mg tablet TAKE 1 TABLET BY MOUTH TWICE DAILY (Patient not taking: Reported on 7/16/2021)     No current facility-administered medications for this visit. [facility] follow up appointment(s):   Future Appointments   Date Time Provider Solitario Fuchs   7/20/2021 11:30 AM Patrizia Mendez MD [facility]   8/16/2021 11:45 AM Jennifer Dimas MD [address]   9/15/2021 10:30 AM Ike Youssef MD [facility] AMB   12/27/2021  2:40 PM Alicja Nevarez PA-C [address]        Non-[facility] follow up appointment(s): NA    Goals Addressed                 This Visit's Progress     Prevent Heart Failure worsening        Patient will begin an exercise program- cycling 15 min. , 3 times a week. Will slowly increase time and days. -Adhere to cardiac diet- low fat, low cholesterol, no added salt  -Patient will assess heart failure zone daily and notify physician if  in Yellow zone    Green zone.    · weight is stable. This means it is not going up or down. · breathing easily. · sleeping well, able to lie flat without shortness of breath. · can perform usual activities. Yellow zone. symptoms are changing. Call doctor. · new or increased shortness of breath. · dizzy or lightheaded, feeling faint. · sudden weight gain, such as 3 pounds or more in 2 to 3 days. · increased swelling in legs, ankles, or feet. · to tired or weak , unable to perform usual activities. · not sleeping well. Shortness of breath at night. need for extra pillows. Red zone. This is an emergency. Call 911.                 symptoms of sudden heart failure, such as:  · severe trouble breathing. · coughing up pink, foamy mucus. · a new irregular or fast heartbeat.   Patient will make and go to all appointments     Patient will be able to verbalize HF zones and when to contact physician by 7/30/21

## 2021-07-20 ENCOUNTER — OFFICE VISIT (OUTPATIENT)
Dept: INTERNAL MEDICINE CLINIC | Age: 81
End: 2021-07-20
Payer: MEDICARE

## 2021-07-20 VITALS
OXYGEN SATURATION: 90 % | BODY MASS INDEX: 28.71 KG/M2 | HEART RATE: 61 BPM | HEIGHT: 62 IN | WEIGHT: 156 LBS | TEMPERATURE: 97 F | DIASTOLIC BLOOD PRESSURE: 87 MMHG | SYSTOLIC BLOOD PRESSURE: 164 MMHG | RESPIRATION RATE: 18 BRPM

## 2021-07-20 DIAGNOSIS — R31.0 GROSS HEMATURIA: Primary | ICD-10-CM

## 2021-07-20 DIAGNOSIS — J44.9 CHRONIC OBSTRUCTIVE PULMONARY DISEASE, UNSPECIFIED COPD TYPE (HCC): ICD-10-CM

## 2021-07-20 PROCEDURE — G8754 DIAS BP LESS 90: HCPCS | Performed by: INTERNAL MEDICINE

## 2021-07-20 PROCEDURE — 99213 OFFICE O/P EST LOW 20 MIN: CPT | Performed by: INTERNAL MEDICINE

## 2021-07-20 PROCEDURE — G8753 SYS BP > OR = 140: HCPCS | Performed by: INTERNAL MEDICINE

## 2021-07-20 PROCEDURE — G8417 CALC BMI ABV UP PARAM F/U: HCPCS | Performed by: INTERNAL MEDICINE

## 2021-07-20 PROCEDURE — 1101F PT FALLS ASSESS-DOCD LE1/YR: CPT | Performed by: INTERNAL MEDICINE

## 2021-07-20 PROCEDURE — G8536 NO DOC ELDER MAL SCRN: HCPCS | Performed by: INTERNAL MEDICINE

## 2021-07-20 PROCEDURE — G8510 SCR DEP NEG, NO PLAN REQD: HCPCS | Performed by: INTERNAL MEDICINE

## 2021-07-20 PROCEDURE — G8427 DOCREV CUR MEDS BY ELIG CLIN: HCPCS | Performed by: INTERNAL MEDICINE

## 2021-07-20 NOTE — PROGRESS NOTES
Progress Note    Patient: Landon Gonzalez               Sex: male                  YOB: 1940      Age:  80 y.o.                    HPI:     Landon Gonzalez is a 80 y.o. male who has been seen for followup of his admission  [facility] . He had gross hematuria  and was taken off eliquis. He had  run out of his O2. 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  Faecalis    Enlarged prostate     GERD (gastroesophageal reflux disease)     Gout     Heart failure (HCC)     Hematuria     Hyperlipidemia     Hypertension     Incomplete emptying of bladder     Lacunar infarction (HCC)     left basal ganglia, right cerebellar    Other ill-defined conditions(799.89)     gout    PAF (paroxysmal atrial fibrillation) (HCC)     Pulmonary hypertension (HCC)     Rectal polyp     Right renal mass     Schizophrenia ([facility]     Stopped smoking with greater than 10 pack year history     Vitamin D deficiency     Weight loss, unintentional        Past Surgical History:   Procedure Laterality Date    HX COLONOSCOPY  04/17/2013    Dr. Espinosa Neighbor    HX ORTHOPAEDIC  [address]    wound repair L. arm    HX TURP  03/01/2017    [facility]       Family History   Problem Relation Age of Onset    Hypertension Mother     Heart Disease Mother     Hypertension Father     Hypertension Brother        Social History     Socioeconomic History    Marital status:      Spouse name: Not on file    Number of children: Not on file    Years of education: Not on file    Highest education level: Not on file   Occupational History    Occupation: plumbing   Tobacco Use    Smoking status: Former Smoker     Packs/day: 0.25     Years: 30.00     Pack years: 7.50     Types: Cigarettes    Smokeless tobacco: Never Used   Substance and Sexual Activity    Alcohol use: No    Drug use: No    Sexual activity: Never     Social Determinants of Health     Financial Resource Strain:     Difficulty of Paying Living Expenses:    Food Insecurity:     Worried About Running Out of Food in the Last Year:     Ran Out of Food in the Last Year:    Transportation Needs:     Lack of Transportation (Medical):  Lack of Transportation (Non-Medical):    Physical Activity:     Days of Exercise per Week:     Minutes of Exercise per Session:    Stress:     Feeling of Stress :    Social Connections:     Frequency of Communication with Friends and Family:     Frequency of Social Gatherings with Friends and Family:     Attends Church Services:     Active Member of Clubs or Organizations:     Attends Club or Organization Meetings:     Marital Status:          Current Outpatient Medications:     Oxygen, 3LPM continuous, Disp: , Rfl:     finasteride (PROSCAR) 5 mg tablet, Take 1 Tablet by mouth daily. , Disp: 90 Tablet, Rfl: 1    furosemide (LASIX) 20 mg tablet, Take 1 Tablet by mouth daily. , Disp: 90 Tablet, Rfl: 1    tamsulosin (FLOMAX) 0.4 mg capsule, Take 1 Capsule by mouth daily. , Disp: 90 Capsule, Rfl: 1    Eliquis 5 mg tablet, TAKE 1 TABLET BY MOUTH TWICE DAILY, Disp: 180 Tab, Rfl: 3    aspirin delayed-release 81 mg tablet, TAKE 1 TABLET BY MOUTH EVERY DAY, Disp: 90 Tab, Rfl: 3    olmesartan (BENICAR) 40 mg tablet, Take 40 mg by mouth daily.  Indications: high blood pressure, Disp: , Rfl:     OLANZapine (ZyPREXA) 5 mg tablet, Take 5 mg by mouth nightly., Disp: , Rfl:     atorvastatin (LIPITOR) 40 mg tablet, TAKE 1 TABLET BY MOUTH EVERY DAY, Disp: 90 Tab, Rfl: 4    amLODIPine (NORVASC) 10 mg tablet, TAKE 1 TABLET BY MOUTH DAILY, Disp: 90 Tab, Rfl: 3    potassium chloride SR (KLOR-CON 10) 10 mEq tablet, TAKE 1 TABLET BY MOUTH EVERY DAY, Disp: 90 Tab, Rfl: 1    magnesium oxide (MAG-OX) 400 mg tablet, TAKE 1 TABLET BY MOUTH EVERY DAY, Disp: 90 Tab, Rfl: 1    amiodarone (CORDARONE) 200 mg tablet, Take 0.5 Tabs by mouth daily. , Disp: 30 Tab, Rfl: 5    carvediloL (COREG) 12.5 mg tablet, Take 1 Tab by mouth two (2) times a day., Disp: 180 Tab, Rfl: 3    isosorbide mononitrate ER (IMDUR) 30 mg tablet, TAKE 1 TABLET BY MOUTH EVERY DAY. PLEASE SCHEDULE FOLLOW UP WITH DOCTOR, Disp: 90 Tab, Rfl: 4    OLANZapine (ZYPREXA) 2.5 mg tablet, Take 2.5 mg by mouth daily. Take 1 tab PO at bedtime, Disp: , Rfl: 6     No Known Allergies    ROS     Physical Exam:      Visit Vitals  BP (!) 164/87 (BP 1 Location: Right upper arm, BP Patient Position: Sitting, BP Cuff Size: Adult)   Pulse 61   Temp 97 °F (36.1 °C) (Temporal)   Resp 18   Ht 5' 2\" (1.575 m)   Wt 156 lb (70.8 kg)   SpO2 90%   BMI 28.53 kg/m²       Physical Exam  Constitutional:       Appearance: Normal appearance. HENT:      Head: Normocephalic and atraumatic. Cardiovascular:      Rate and Rhythm: Normal rate and regular rhythm. Pulmonary:      Effort: Pulmonary effort is normal. No respiratory distress. Breath sounds: No stridor. No wheezing, rhonchi or rales. Skin:     General: Skin is warm and dry. Neurological:      Mental Status: He is alert. Labs Reviewed:      Assessment/Plan       ICD-10-CM ICD-9-CM    1. Gross hematuria  R31.0 599.71 REFERRAL TO UROLOGY   2.  Chronic obstructive pulmonary disease, unspecified COPD type ([facility]  J44.9 [address] MD

## 2021-07-20 NOTE — PROGRESS NOTES
Diamond Maravilla is a 80 y.o. male (: 1940) presenting to address:    Chief Complaint   Patient presents with   [facility]       Vitals:    21 1108   BP: (!) 164/87   Pulse: 61   Resp: 18   Temp: 97 °F (36.1 °C)   TempSrc: Temporal   SpO2: 90%   Weight: 156 lb (70.8 kg)   Height: 5' 2\" (1.575 m)   PainSc:   0 - No pain       Hearing/Vision:   No exam data present    Learning Assessment:     Learning Assessment 2019   PRIMARY LEARNER Patient   HIGHEST LEVEL OF EDUCATION - PRIMARY LEARNER  -   BARRIERS PRIMARY LEARNER -   CO-LEARNER CAREGIVER -   PRIMARY LANGUAGE ENGLISH   LEARNER PREFERENCE PRIMARY DEMONSTRATION   ANSWERED BY pt   RELATIONSHIP SELF     Depression Screening:     3 most recent PHQ Screens 2021   Little interest or pleasure in doing things Not at all   Feeling down, depressed, irritable, or hopeless Not at all   Total Score PHQ 2 0     Fall Risk Assessment:     Fall Risk Assessment, last 12 mths 2021   Able to walk? Yes   Fall in past 12 months? 0   Number of falls in past 12 months -   Fall with injury? -     Abuse Screening:     Abuse Screening Questionnaire 5/10/2018   Do you ever feel afraid of your partner? N   Are you in a relationship with someone who physically or mentally threatens you? N   Is it safe for you to go home? Y     Coordination of Care Questionaire:   1. Have you been to the ER, urgent care clinic since your last visit? Hospitalized since your last visit? YES [facility]    2. Have you seen or consulted any other health care providers outside of the [address] [place] [address] since your last visit? Include any pap smears or colon screening. NO    Advanced Directive:   1. Do you have an Advanced Directive? NO    2. Would you like information on Advanced Directives?  NO

## 2021-07-21 ENCOUNTER — DOCUMENTATION ONLY (OUTPATIENT)
Dept: INTERNAL MEDICINE CLINIC | Age: 81
End: 2021-07-21

## 2021-07-21 ENCOUNTER — PATIENT OUTREACH (OUTPATIENT)
Dept: CASE MANAGEMENT | Age: 81
End: 2021-07-21

## 2021-07-21 NOTE — PROGRESS NOTES
Complex Case Management      Date/Time:  2021 1:04 PM    Method of communication with patient:phone    [facility] ([facility]) contacted the patient by telephone to perform Ambulatory Care Coordination. Verified name and  (PHI) with patient as identifiers. Provided introduction to self, and explanation of the Ambulatory Care Manager's role. Top Challenges reviewed with the patient   1. Patient given contact information for [facility] [employer] and [employer], he wants his daughter to call and see if he can qualify for any services. The patient agrees to contact the PCP office or the [address] [place] [address] for questions related to their healthcare. Provided contact information for future reference. Disease Specific:   CHF  - weight 152.2. Denies any edema, wheezing, cough or chest pain, states shortness of breath is 'same'. Medication(s):   Medication reconciliation was performed with patient, who verbalizes understanding of administration of home medications. There were no barriers to obtaining medications identified at this time. Referral to Pharm D needed: no     Current Outpatient Medications   Medication Sig    Oxygen 3LPM continuous    finasteride (PROSCAR) 5 mg tablet Take 1 Tablet by mouth daily.  furosemide (LASIX) 20 mg tablet Take 1 Tablet by mouth daily.  tamsulosin (FLOMAX) 0.4 mg capsule Take 1 Capsule by mouth daily.  aspirin delayed-release 81 mg tablet TAKE 1 TABLET BY MOUTH EVERY DAY    olmesartan (BENICAR) 40 mg tablet Take 40 mg by mouth daily. Indications: high blood pressure    OLANZapine (ZyPREXA) 5 mg tablet Take 5 mg by mouth nightly.     atorvastatin (LIPITOR) 40 mg tablet TAKE 1 TABLET BY MOUTH EVERY DAY    amLODIPine (NORVASC) 10 mg tablet TAKE 1 TABLET BY MOUTH DAILY    potassium chloride SR (KLOR-CON 10) 10 mEq tablet TAKE 1 TABLET BY MOUTH EVERY DAY    magnesium oxide (MAG-OX) 400 mg tablet TAKE 1 TABLET BY MOUTH EVERY DAY    amiodarone (CORDARONE) 200 mg tablet Take 0.5 Tabs by mouth daily.  carvediloL (COREG) 12.5 mg tablet Take 1 Tab by mouth two (2) times a day.  isosorbide mononitrate ER (IMDUR) 30 mg tablet TAKE 1 TABLET BY MOUTH EVERY DAY. PLEASE SCHEDULE FOLLOW UP WITH DOCTOR    OLANZapine (ZYPREXA) 2.5 mg tablet Take 2.5 mg by mouth daily. Take 1 tab PO at bedtime    Eliquis 5 mg tablet TAKE 1 TABLET BY MOUTH TWICE DAILY (Patient not taking: Reported on 7/21/2021)     No current facility-administered medications for this visit. [facility] follow up appointment(s):   Future Appointments   Date Time Provider Solitario Shila   8/16/2021 11:45 AM Corey Naqvi MD [address] [place] [address]   9/15/2021 10:30 AM Keely Fitzpatrick MD [facility] BS AMB   10/20/2021  1:30 PM Tanya Joe MD [facility] BS AMB   12/27/2021  2:40 PM Eric Horner PA-C [address] [place] [address]        Non-[facility] follow up appointment(s): NA    Goals Addressed                 This Visit's Progress     Prevent Heart Failure worsening   No change     Patient will begin an exercise program- cycling 15 min. , 3 times a week. Will slowly increase time and days. -Adhere to cardiac diet- low fat, low cholesterol, no added salt  -Patient will assess heart failure zone daily and notify physician if  in Yellow zone    Green zone. · weight is stable. This means it is not going up or down. · breathing easily. · sleeping well, able to lie flat without shortness of breath. · can perform usual activities. Yellow zone. symptoms are changing. Call doctor. · new or increased shortness of breath. · dizzy or lightheaded, feeling faint. · sudden weight gain, such as 3 pounds or more in 2 to 3 days. · increased swelling in legs, ankles, or feet. · to tired or weak , unable to perform usual activities. · not sleeping well. Shortness of breath at night. need for extra pillows. Red zone. This is an emergency.  Call 911.                 symptoms of sudden heart failure, such as:  · severe trouble breathing. · coughing up pink, foamy mucus. · a new irregular or fast heartbeat.   Patient will make and go to all appointments     Patient will be able to verbalize HF zones and when to contact physician by 7/30/21

## 2021-07-27 DIAGNOSIS — E87.6 HYPOKALEMIA: ICD-10-CM

## 2021-07-27 DIAGNOSIS — Z79.899 MEDICATION MANAGEMENT: ICD-10-CM

## 2021-07-27 RX ORDER — LANOLIN ALCOHOL/MO/W.PET/CERES
CREAM (GRAM) TOPICAL
Qty: 90 TABLET | Refills: 1 | Status: SHIPPED | OUTPATIENT
Start: 2021-07-27 | End: 2022-01-24

## 2021-07-28 ENCOUNTER — PATIENT OUTREACH (OUTPATIENT)
Dept: CASE MANAGEMENT | Age: 81
End: 2021-07-28

## 2021-07-28 NOTE — PROGRESS NOTES
Complex Case Management      Date/Time:  2021 3:55 PM    Method of communication with patient:phone    [address] [facility] ([facility]) contacted the patient by telephone to perform Ambulatory Care Coordination. Verified name and  (PHI) with patient as identifiers. Provided introduction to self, and explanation of the Ambulatory Care Manager's role. Reviewed most recent clinic visit w/ patient who verbalized understanding. Patient given an opportunity to ask questions. Top Challenges reviewed with the patient   1. Weight 151.2 lbs. Denies edema, shortness of breath or chest pain  2. Patient able to verbalize signs of yellow zone and when to call physician  3. Patient/daughter have not yet attempted to contact agencies about assistance. The patient agrees to contact the PCP office or the [facility] for questions related to their healthcare. Provided contact information for future reference. Medication(s):   Medication reconciliation was performed with patient, who verbalizes understanding of administration of home medications. There were no barriers to obtaining medications identified at this time. Referral to Pharm D needed: no     Current Outpatient Medications   Medication Sig    magnesium oxide (MAG-OX) 400 mg tablet TAKE 1 TABLET BY MOUTH EVERY DAY    Oxygen 3LPM continuous    finasteride (PROSCAR) 5 mg tablet Take 1 Tablet by mouth daily.  furosemide (LASIX) 20 mg tablet Take 1 Tablet by mouth daily.  tamsulosin (FLOMAX) 0.4 mg capsule Take 1 Capsule by mouth daily.  Eliquis 5 mg tablet TAKE 1 TABLET BY MOUTH TWICE DAILY    aspirin delayed-release 81 mg tablet TAKE 1 TABLET BY MOUTH EVERY DAY    olmesartan (BENICAR) 40 mg tablet Take 40 mg by mouth daily. Indications: high blood pressure    OLANZapine (ZyPREXA) 5 mg tablet Take 5 mg by mouth nightly.     atorvastatin (LIPITOR) 40 mg tablet TAKE 1 TABLET BY MOUTH EVERY DAY    amLODIPine (NORVASC) 10 mg tablet TAKE 1 TABLET BY MOUTH DAILY    potassium chloride SR (KLOR-CON 10) 10 mEq tablet TAKE 1 TABLET BY MOUTH EVERY DAY    amiodarone (CORDARONE) 200 mg tablet Take 0.5 Tabs by mouth daily.  carvediloL (COREG) 12.5 mg tablet Take 1 Tab by mouth two (2) times a day.  isosorbide mononitrate ER (IMDUR) 30 mg tablet TAKE 1 TABLET BY MOUTH EVERY DAY. PLEASE SCHEDULE FOLLOW UP WITH DOCTOR    OLANZapine (ZYPREXA) 2.5 mg tablet Take 2.5 mg by mouth daily. Take 1 tab PO at bedtime     No current facility-administered medications for this visit. [facility] follow up appointment(s):   Future Appointments   Date Time Provider Solitario Shila   8/16/2021 11:45 AM Mikhail Caballero MD [address]   9/15/2021 10:30 AM Sharath Jiménez MD [facility] BS AMB   10/20/2021  1:30 PM MD ALEXI Schafer BS AMB   12/27/2021  2:40 PM Prashanth Jenkins PA-C [address]        Non-[facility] follow up appointment(s): NA    Goals Addressed                 This Visit's Progress     Prevent Heart Failure worsening   On track     Patient will begin an exercise program- cycling 15 min. , 3 times a week. Will slowly increase time and days. -Adhere to cardiac diet- low fat, low cholesterol, no added salt  -Patient will assess heart failure zone daily and notify physician if  in Yellow zone    Green zone. · weight is stable. This means it is not going up or down. · breathing easily. · sleeping well, able to lie flat without shortness of breath. · can perform usual activities. Yellow zone. symptoms are changing. Call doctor. · new or increased shortness of breath. · dizzy or lightheaded, feeling faint. · sudden weight gain, such as 3 pounds or more in 2 to 3 days. · increased swelling in legs, ankles, or feet. · to tired or weak , unable to perform usual activities. · not sleeping well. Shortness of breath at night. need for extra pillows. Red zone. This is an emergency.  Call 911.                 symptoms of sudden heart failure, such as:  · severe trouble breathing. · coughing up pink, foamy mucus. · a new irregular or fast heartbeat.   Patient will make and go to all appointments     Patient will be able to verbalize HF zones and when to contact physician by 7/30/21

## 2021-08-04 ENCOUNTER — PATIENT OUTREACH (OUTPATIENT)
Dept: CASE MANAGEMENT | Age: 81
End: 2021-08-04

## 2021-08-04 NOTE — PROGRESS NOTES
Complex Case Management      Date/Time:  2021 2:45 PM    Method of communication with patient:phone    [address] [place] [address] ([facility]) contacted the patient by telephone to perform Ambulatory Care Coordination. Verified name and  (PHI) with patient as identifiers. Provided introduction to self, and explanation of the Ambulatory Care Manager's role. Top Challenges reviewed with the patient   1. Patient states he received phone call from an agency (but he can't remember the name of agency), he was told that a  would be contacting him to evaluate for assistance. The patient agrees to contact the PCP office or the [address] [place] [address] for questions related to their healthcare. Provided contact information for future reference. Disease Specific:   CHF - denies any edema, shortness of breath or chest pain. Weight - 148 lbs. Able to verbalize yellow zone and when to call physician. Medication(s):   Medication reconciliation was performed with patient, who verbalizes understanding of administration of home medications. There were no barriers to obtaining medications identified at this time. Referral to Pharm D needed: no     Current Outpatient Medications   Medication Sig    magnesium oxide (MAG-OX) 400 mg tablet TAKE 1 TABLET BY MOUTH EVERY DAY    Oxygen 3LPM continuous    finasteride (PROSCAR) 5 mg tablet Take 1 Tablet by mouth daily.  tamsulosin (FLOMAX) 0.4 mg capsule Take 1 Capsule by mouth daily.  aspirin delayed-release 81 mg tablet TAKE 1 TABLET BY MOUTH EVERY DAY    OLANZapine (ZyPREXA) 5 mg tablet Take 5 mg by mouth nightly.  atorvastatin (LIPITOR) 40 mg tablet TAKE 1 TABLET BY MOUTH EVERY DAY    amLODIPine (NORVASC) 10 mg tablet TAKE 1 TABLET BY MOUTH DAILY    potassium chloride SR (KLOR-CON 10) 10 mEq tablet TAKE 1 TABLET BY MOUTH EVERY DAY    amiodarone (CORDARONE) 200 mg tablet Take 0.5 Tabs by mouth daily.     carvediloL (COREG) 12.5 mg tablet Take 1 Tab by mouth two (2) times a day.  isosorbide mononitrate ER (IMDUR) 30 mg tablet TAKE 1 TABLET BY MOUTH EVERY DAY. PLEASE SCHEDULE FOLLOW UP WITH DOCTOR    furosemide (LASIX) 20 mg tablet Take 1 Tablet by mouth daily. (Patient not taking: Reported on 8/4/2021)    Eliquis 5 mg tablet TAKE 1 TABLET BY MOUTH TWICE DAILY (Patient not taking: Reported on 8/4/2021)    olmesartan (BENICAR) 40 mg tablet Take 40 mg by mouth daily. Indications: high blood pressure (Patient not taking: Reported on 8/4/2021)    OLANZapine (ZYPREXA) 2.5 mg tablet Take 2.5 mg by mouth daily. Take 1 tab PO at bedtime (Patient not taking: Reported on 8/4/2021)     No current facility-administered medications for this visit. [facility] follow up appointment(s):   Future Appointments   Date Time Provider Solitario Shila   8/16/2021 11:45 AM Larisa Villasenor MD [address] [place] [address]   9/15/2021 10:30 AM Severa Salk, MD [facility] BS AMB   10/20/2021  1:30 PM Sharda Recio MD A BS AMB   12/27/2021  2:40 PM Chitra Rodriguez PA-C [address] [place] [address]        Non-[facility] follow up appointment(s): NA    Goals Addressed                 This Visit's Progress     Prevent Heart Failure worsening   On track     Patient will begin an exercise program- cycling 15 min. , 3 times a week. Will slowly increase time and days. -Adhere to cardiac diet- low fat, low cholesterol, no added salt  -Patient will assess heart failure zone daily and notify physician if  in Yellow zone    Green zone. · weight is stable. This means it is not going up or down. · breathing easily. · sleeping well, able to lie flat without shortness of breath. · can perform usual activities. Yellow zone. symptoms are changing. Call doctor. · new or increased shortness of breath. · dizzy or lightheaded, feeling faint. · sudden weight gain, such as 3 pounds or more in 2 to 3 days. · increased swelling in legs, ankles, or feet.   · to tired or weak , unable to perform usual activities. · not sleeping well. Shortness of breath at night. need for extra pillows. Red zone. This is an emergency. Call 911.                 symptoms of sudden heart failure, such as:  · severe trouble breathing. · coughing up pink, foamy mucus. · a new irregular or fast heartbeat.   Patient will make and go to all appointments     Patient will be able to verbalize HF zones and when to contact physician by 7/30/21

## 2021-08-06 DIAGNOSIS — Z79.899 MEDICATION MANAGEMENT: ICD-10-CM

## 2021-08-06 RX ORDER — POTASSIUM CHLORIDE 750 MG/1
TABLET, FILM COATED, EXTENDED RELEASE ORAL
Qty: 90 TABLET | Refills: 1 | Status: SHIPPED | OUTPATIENT
Start: 2021-08-06 | End: 2021-12-01

## 2021-08-12 ENCOUNTER — PATIENT OUTREACH (OUTPATIENT)
Dept: CASE MANAGEMENT | Age: 81
End: 2021-08-12

## 2021-08-12 NOTE — PROGRESS NOTES
Complex Case Management      Date/Time:  2021 3:14 PM    Method of communication with patient:phone    [address] [facility] ([facility]) contacted the patient by telephone to perform Ambulatory Care Coordination. Verified name and  (PHI) with patient as identifiers. Provided introduction to self, and explanation of the Ambulatory Care Manager's role. Top Challenges reviewed with the patient   1. States he had to cancel physician appointment because he did not have portable O2. States [employer] delivered a small tank today, told him it would last up to 3 hours. He says DME has to have order from physician to provide him with portable O2.   2. States he received call from agency about assistance in home but was told that they did not service [place] so they gave him a number to call, says he will call tomorrow (was waiting to get O2 issue \"straightened out\" before calling). The patient agrees to contact the PCP office or the [facility] for questions related to their healthcare. Provided contact information for future reference. Disease Specific:   CHF  Weight 150 lbs today. Denies any edema, increased shortness of breath. Verbalizes that he is in green zone today. DME Active: Yes  [employer] - Oxygen and supplies   [phone]    Medication(s):   Medication reconciliation was performed with patient, who verbalizes understanding of administration of home medications. There were no barriers to obtaining medications identified at this time. Referral to Pharm D needed: no     Current Outpatient Medications   Medication Sig    potassium chloride SR (KLOR-CON 10) 10 mEq tablet TAKE 1 TABLET BY MOUTH EVERY DAY    magnesium oxide (MAG-OX) 400 mg tablet TAKE 1 TABLET BY MOUTH EVERY DAY    Oxygen 3LPM continuous    finasteride (PROSCAR) 5 mg tablet Take 1 Tablet by mouth daily.  tamsulosin (FLOMAX) 0.4 mg capsule Take 1 Capsule by mouth daily.     aspirin delayed-release 81 mg tablet TAKE 1 TABLET BY MOUTH EVERY DAY    OLANZapine (ZyPREXA) 5 mg tablet Take 5 mg by mouth nightly.  atorvastatin (LIPITOR) 40 mg tablet TAKE 1 TABLET BY MOUTH EVERY DAY    amLODIPine (NORVASC) 10 mg tablet TAKE 1 TABLET BY MOUTH DAILY    amiodarone (CORDARONE) 200 mg tablet Take 0.5 Tabs by mouth daily.  carvediloL (COREG) 12.5 mg tablet Take 1 Tab by mouth two (2) times a day.  isosorbide mononitrate ER (IMDUR) 30 mg tablet TAKE 1 TABLET BY MOUTH EVERY DAY. PLEASE SCHEDULE FOLLOW UP WITH DOCTOR    furosemide (LASIX) 20 mg tablet Take 1 Tablet by mouth daily. (Patient not taking: Reported on 8/4/2021)    Eliquis 5 mg tablet TAKE 1 TABLET BY MOUTH TWICE DAILY (Patient not taking: Reported on 8/4/2021)    olmesartan (BENICAR) 40 mg tablet Take 40 mg by mouth daily. Indications: high blood pressure (Patient not taking: Reported on 8/4/2021)    OLANZapine (ZYPREXA) 2.5 mg tablet Take 2.5 mg by mouth daily. Take 1 tab PO at bedtime (Patient not taking: Reported on 8/4/2021)     No current facility-administered medications for this visit. BS follow up appointment(s):   Future Appointments   Date Time Provider Solitario Fuchs   9/15/2021 10:30 AM Cecilia Jacinto MD [facility] BS AMB   10/20/2021  1:30 PM Leonel Dash MD [facility] BS AMB   12/27/2021  2:40 PM Cherelle Horner PA-C [address]        Non-[facility] follow up appointment(s): NA    Goals Addressed                 This Visit's Progress     Prevent Heart Failure worsening   On track     Patient will begin an exercise program- cycling 15 min. , 3 times a week. Will slowly increase time and days. -Adhere to cardiac diet- low fat, low cholesterol, no added salt  -Patient will assess heart failure zone daily and notify physician if  in Yellow zone    Green zone. · weight is stable. This means it is not going up or down. · breathing easily. · sleeping well, able to lie flat without shortness of breath.   · can perform usual activities. Yellow zone. symptoms are changing. Call doctor. · new or increased shortness of breath. · dizzy or lightheaded, feeling faint. · sudden weight gain, such as 3 pounds or more in 2 to 3 days. · increased swelling in legs, ankles, or feet. · to tired or weak , unable to perform usual activities. · not sleeping well. Shortness of breath at night. need for extra pillows. Red zone. This is an emergency. Call 911.                 symptoms of sudden heart failure, such as:  · severe trouble breathing. · coughing up pink, foamy mucus. · a new irregular or fast heartbeat.   Patient will make and go to all appointments     Patient will be able to verbalize HF zones and when to contact physician by 7/30/21

## 2021-09-02 ENCOUNTER — PATIENT OUTREACH (OUTPATIENT)
Dept: CASE MANAGEMENT | Age: 81
End: 2021-09-02

## 2021-09-02 NOTE — PROGRESS NOTES
Complex Case Management      Date/Time:  2021 3:22 PM    Method of communication with patient:phone    [facility] ([facility]) contacted the patient by telephone to perform Ambulatory Care Coordination. Verified name and  (PHI) with patient as identifiers. Provided introduction to self, and explanation of the Ambulatory Care Manager's role. Top Challenges reviewed with the patient   1. States he now has someone coming in to clean bathroom and kitchen (which is what he wanted)       The patient agrees to contact the PCP office or the [address] [place] [address] for questions related to their healthcare. Provided contact information for future reference. Disease Specific:   CHF - weight 151.8 lbs. Denies increased shortness of breath or edema, denies feeling \"full\" around abdomen      Medication(s):   Medication reconciliation was performed with patient, who verbalizes understanding of administration of home medications. There were no barriers to obtaining medications identified at this time. Referral to Pharm D needed: no     Current Outpatient Medications   Medication Sig    potassium chloride SR (KLOR-CON 10) 10 mEq tablet TAKE 1 TABLET BY MOUTH EVERY DAY    magnesium oxide (MAG-OX) 400 mg tablet TAKE 1 TABLET BY MOUTH EVERY DAY    Oxygen 3LPM continuous    finasteride (PROSCAR) 5 mg tablet Take 1 Tablet by mouth daily.  tamsulosin (FLOMAX) 0.4 mg capsule Take 1 Capsule by mouth daily.  aspirin delayed-release 81 mg tablet TAKE 1 TABLET BY MOUTH EVERY DAY    OLANZapine (ZyPREXA) 5 mg tablet Take 5 mg by mouth nightly.  atorvastatin (LIPITOR) 40 mg tablet TAKE 1 TABLET BY MOUTH EVERY DAY    amLODIPine (NORVASC) 10 mg tablet TAKE 1 TABLET BY MOUTH DAILY    amiodarone (CORDARONE) 200 mg tablet Take 0.5 Tabs by mouth daily.  carvediloL (COREG) 12.5 mg tablet Take 1 Tab by mouth two (2) times a day.     isosorbide mononitrate ER (IMDUR) 30 mg tablet TAKE 1 TABLET BY MOUTH EVERY DAY. PLEASE SCHEDULE FOLLOW UP WITH DOCTOR    OLANZapine (ZYPREXA) 2.5 mg tablet Take 2.5 mg by mouth daily. Take 1 tab PO at bedtime    furosemide (LASIX) 20 mg tablet Take 1 Tablet by mouth daily. (Patient not taking: Reported on 9/2/2021)    Eliquis 5 mg tablet TAKE 1 TABLET BY MOUTH TWICE DAILY (Patient not taking: Reported on 8/4/2021)    olmesartan (BENICAR) 40 mg tablet Take 40 mg by mouth daily. Indications: high blood pressure (Patient not taking: Reported on 8/4/2021)     No current facility-administered medications for this visit. [facility] follow up appointment(s):   Future Appointments   Date Time Provider Solitario Eugenei   9/15/2021 10:30 AM Ania Abarca MD [facility] BS AMB   10/20/2021  1:30 PM Eric Torres MD [facility] BS AMB   12/27/2021  2:40 PM Cherelle Horner PA-C [facility]        Non-[facility] follow up appointment(s): NA    Goals Addressed                 This Visit's Progress     Prevent Heart Failure worsening   On track     Patient will begin an exercise program- cycling 15 min. , 3 times a week. Will slowly increase time and days. -Adhere to cardiac diet- low fat, low cholesterol, no added salt  -Patient will assess heart failure zone daily and notify physician if  in Yellow zone    Green zone. · weight is stable. This means it is not going up or down. · breathing easily. · sleeping well, able to lie flat without shortness of breath. · can perform usual activities. Yellow zone. symptoms are changing. Call doctor. · new or increased shortness of breath. · dizzy or lightheaded, feeling faint. · sudden weight gain, such as 3 pounds or more in 2 to 3 days. · increased swelling in legs, ankles, or feet. · to tired or weak , unable to perform usual activities. · not sleeping well. Shortness of breath at night. need for extra pillows. Red zone. This is an emergency.  Call 911.                 symptoms of sudden heart failure, such as:  · severe trouble breathing. · coughing up pink, foamy mucus. · a new irregular or fast heartbeat.   Patient will make and go to all appointments     Patient will be able to verbalize HF zones and when to contact physician by 7/30/21

## 2021-09-15 ENCOUNTER — OFFICE VISIT (OUTPATIENT)
Dept: PULMONOLOGY | Age: 81
End: 2021-09-15
Payer: MEDICARE

## 2021-09-15 VITALS
HEIGHT: 62 IN | SYSTOLIC BLOOD PRESSURE: 140 MMHG | TEMPERATURE: 97.7 F | BODY MASS INDEX: 28.85 KG/M2 | RESPIRATION RATE: 18 BRPM | OXYGEN SATURATION: 96 % | WEIGHT: 156.8 LBS | DIASTOLIC BLOOD PRESSURE: 80 MMHG | HEART RATE: 66 BPM

## 2021-09-15 DIAGNOSIS — Z99.81 HYPOXEMIA REQUIRING SUPPLEMENTAL OXYGEN: ICD-10-CM

## 2021-09-15 DIAGNOSIS — R91.1 LUNG NODULE: Primary | ICD-10-CM

## 2021-09-15 DIAGNOSIS — I27.20 PULMONARY HYPERTENSION (HCC): ICD-10-CM

## 2021-09-15 DIAGNOSIS — R09.02 HYPOXEMIA REQUIRING SUPPLEMENTAL OXYGEN: ICD-10-CM

## 2021-09-15 DIAGNOSIS — I50.30 HEART FAILURE WITH PRESERVED EJECTION FRACTION, UNSPECIFIED HF CHRONICITY (HCC): ICD-10-CM

## 2021-09-15 PROCEDURE — G8536 NO DOC ELDER MAL SCRN: HCPCS | Performed by: INTERNAL MEDICINE

## 2021-09-15 PROCEDURE — 99204 OFFICE O/P NEW MOD 45 MIN: CPT | Performed by: INTERNAL MEDICINE

## 2021-09-15 PROCEDURE — G8427 DOCREV CUR MEDS BY ELIG CLIN: HCPCS | Performed by: INTERNAL MEDICINE

## 2021-09-15 PROCEDURE — G8432 DEP SCR NOT DOC, RNG: HCPCS | Performed by: INTERNAL MEDICINE

## 2021-09-15 PROCEDURE — G8754 DIAS BP LESS 90: HCPCS | Performed by: INTERNAL MEDICINE

## 2021-09-15 PROCEDURE — 1101F PT FALLS ASSESS-DOCD LE1/YR: CPT | Performed by: INTERNAL MEDICINE

## 2021-09-15 PROCEDURE — G8753 SYS BP > OR = 140: HCPCS | Performed by: INTERNAL MEDICINE

## 2021-09-15 PROCEDURE — G8417 CALC BMI ABV UP PARAM F/U: HCPCS | Performed by: INTERNAL MEDICINE

## 2021-09-15 NOTE — PROGRESS NOTES
HISTORY OF PRESENT ILLNESS  Jacqueline Mcguire is a 80 y.o. male referred for chronic hypoxemia. Pt is a former smoker with complaint of shortness of breath with mild exertion. History is obtained mainly from pt's sister and outside records review. Dyspnea progressed and now occurs after walking 1 block. This improved somewhat with starting supplemental O2 after a hospital admission. He is able to ambulate better but is finds the portable O2 tanks cumbersome. Pt denies orthopnea or PND. He has intermittent pedal edema. Pt has a history of cardiomyopathy with slightly reduced EF. Echo also shows pulmonary hypertension, PASP 65 mm Hg. Review of historical Echos also show a positive bubble study in 2016. CT scan in 2018 revealed a new 2 x 1.6 cm pulmonary nodule. No follow up CT available. Review of Systems   Constitutional: Positive for malaise/fatigue. Negative for chills, diaphoresis, fever and weight loss. HENT: Negative for congestion, ear discharge, ear pain, hearing loss, nosebleeds, sinus pain and tinnitus. Eyes: Negative for blurred vision, double vision, photophobia, pain, discharge and redness. Respiratory: Positive for shortness of breath. Negative for cough, hemoptysis and sputum production. Wheezing: rare. Cardiovascular: Negative for chest pain, palpitations, orthopnea, claudication, leg swelling and PND. Gastrointestinal: Negative for abdominal pain, constipation, diarrhea, heartburn, nausea and vomiting. Genitourinary: Negative for dysuria, flank pain, frequency, hematuria and urgency. Musculoskeletal: Positive for back pain and neck pain. Negative for joint pain and myalgias. Skin: Negative for itching and rash. Neurological: Negative for dizziness, tingling, tremors, sensory change, speech change, focal weakness, seizures, loss of consciousness, weakness and headaches. Endo/Heme/Allergies: Negative for environmental allergies and polydipsia.  Does not bruise/bleed easily. Psychiatric/Behavioral: Positive for depression. Negative for hallucinations, memory loss, substance abuse and suicidal ideas. The patient is not nervous/anxious. 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 Faecalis    Enlarged prostate     GERD (gastroesophageal reflux disease)     Gout     Heart failure (HCC)     Hematuria     Hyperlipidemia     Hypertension     Incomplete emptying of bladder     Lacunar infarction (HCC)     left basal ganglia, right cerebellar    Other ill-defined conditions(799.89)     gout    PAF (paroxysmal atrial fibrillation) (HCC)     Pulmonary hypertension (HCC)     Rectal polyp     Right renal mass     Schizophrenia (HCC)     Stopped smoking with greater than 10 pack year history     Vitamin D deficiency     Weight loss, unintentional      Past Surgical History:   Procedure Laterality Date    HX COLONOSCOPY  04/17/2013    Dr. Butch Delgado    HX ENDOSCOPY      HX ORTHOPAEDIC  0244'K    wound repair L. arm    HX TURP  03/01/2017     TRANSURETHRAL RESECTION OF PROSTATE;  Surgeon: Suraj Ratliff MD; [facility]     Current Outpatient Medications on File Prior to Visit   Medication Sig Dispense Refill    potassium chloride SR (KLOR-CON 10) 10 mEq tablet TAKE 1 TABLET BY MOUTH EVERY DAY 90 Tablet 1    magnesium oxide (MAG-OX) 400 mg tablet TAKE 1 TABLET BY MOUTH EVERY DAY 90 Tablet 1    Oxygen 3LPM continuous      finasteride (PROSCAR) 5 mg tablet Take 1 Tablet by mouth daily. 90 Tablet 1    tamsulosin (FLOMAX) 0.4 mg capsule Take 1 Capsule by mouth daily.  90 Capsule 1    aspirin delayed-release 81 mg tablet TAKE 1 TABLET BY MOUTH EVERY DAY 90 Tab 3    OLANZapine (ZyPREXA) 5 mg tablet Take 5 mg by mouth nightly.  atorvastatin (LIPITOR) 40 mg tablet TAKE 1 TABLET BY MOUTH EVERY DAY 90 Tab 4    amLODIPine (NORVASC) 10 mg tablet TAKE 1 TABLET BY MOUTH DAILY 90 Tab 3    amiodarone (CORDARONE) 200 mg tablet Take 0.5 Tabs by mouth daily. 30 Tab 5    carvediloL (COREG) 12.5 mg tablet Take 1 Tab by mouth two (2) times a day. 180 Tab 3    isosorbide mononitrate ER (IMDUR) 30 mg tablet TAKE 1 TABLET BY MOUTH EVERY DAY. PLEASE SCHEDULE FOLLOW UP WITH DOCTOR 90 Tab 4    furosemide (LASIX) 20 mg tablet Take 1 Tablet by mouth daily. (Patient not taking: Reported on 9/2/2021) 90 Tablet 1    Eliquis 5 mg tablet TAKE 1 TABLET BY MOUTH TWICE DAILY (Patient not taking: Reported on 8/4/2021) 180 Tab 3    olmesartan (BENICAR) 40 mg tablet Take 40 mg by mouth daily. Indications: high blood pressure (Patient not taking: Reported on 8/4/2021)      OLANZapine (ZYPREXA) 2.5 mg tablet Take 2.5 mg by mouth daily. Take 1 tab PO at bedtime (Patient not taking: Reported on 9/15/2021)  6     No current facility-administered medications on file prior to visit.      No Known Allergies  Family History   Problem Relation Age of Onset    Hypertension Mother     Heart Disease Mother     Hypertension Father     Hypertension Brother      Social History     Socioeconomic History    Marital status: SINGLE     Spouse name: Not on file    Number of children: Not on file    Years of education: Not on file    Highest education level: Not on file   Occupational History    Occupation: plumbing   Tobacco Use    Smoking status: Former Smoker     Packs/day: 0.50     Years: 30.00     Pack years: 15.00     Types: Cigarettes    Smokeless tobacco: Never Used   Substance and Sexual Activity    Alcohol use: No    Drug use: No    Sexual activity: Never   Other Topics Concern    Not on file   Social History Narrative    Not on file     Social Determinants of Health     Financial Resource Strain:     Difficulty of Paying Living Expenses:    Food Insecurity:     Worried About Running Out of Food in the Last Year:     [address] in the Last Year:    Transportation Needs:     Lack of Transportation (Medical):  Lack of Transportation (Non-Medical):    Physical Activity:     Days of Exercise per Week:     Minutes of Exercise per Session:    Stress:     Feeling of Stress :    Social Connections:     Frequency of Communication with Friends and Family:     Frequency of Social Gatherings with Friends and Family:     Attends Worship Services:     Active Member of Clubs or Organizations:     Attends Club or Organization Meetings:     Marital Status:    Intimate Partner Violence:     Fear of Current or Ex-Partner:     Emotionally Abused:     Physically Abused:     Sexually Abused:      Blood pressure (!) 140/80, pulse 66, temperature 97.7 °F (36.5 °C), temperature source Oral, resp. rate 18, height 5' 2\" (1.575 m), weight 71.1 kg (156 lb 12.8 oz), SpO2 96 %. Physical Exam  Constitutional:       General: He is not in acute distress. Appearance: He is not ill-appearing, toxic-appearing or diaphoretic. HENT:      Head: Normocephalic and atraumatic. Right Ear: External ear normal.      Left Ear: External ear normal.      Nose:      Comments: Deferred      Mouth/Throat:      Comments: Deferred   Eyes:      General:         Right eye: No discharge. Left eye: No discharge. Extraocular Movements: Extraocular movements intact. Conjunctiva/sclera: Conjunctivae normal.      Pupils: Pupils are equal, round, and reactive to light. Neck:      Vascular: No carotid bruit. Cardiovascular:      Rate and Rhythm: Normal rate and regular rhythm. Heart sounds: Murmur (harsh holosystolic clarissa LSB, lower pitched systolic at R base) heard. Pulmonary:      Effort: Pulmonary effort is normal. No respiratory distress. Breath sounds: Normal breath sounds. No stridor. No wheezing or rhonchi. Rales: fleeting R base. Chest:      Chest wall: No tenderness. Abdominal:      General: Abdomen is flat. Palpations: Abdomen is soft. There is no mass. Tenderness: There is no abdominal tenderness. Musculoskeletal:         General: No swelling, tenderness, deformity or signs of injury. Cervical back: No rigidity or tenderness. Right lower leg: Right lower leg edema: 1+      Left lower leg: Left lower leg edema: 1+   Lymphadenopathy:      Cervical: No cervical adenopathy. Skin:     General: Skin is warm and dry. Coloration: Skin is not jaundiced or pale. Findings: No bruising, erythema, lesion or rash. Neurological:      General: No focal deficit present. Mental Status: He is alert and oriented to person, place, and time. Coordination: Coordination normal.   Psychiatric:         Mood and Affect: Mood normal.      Comments: Slow speech and reaction time       Echo Cardiogram Complete    Component 4 mo ago   EF Echo  50       Narrative    CONCLUSIONS     * Left ventricular chamber volume is normal with moderate concentric   hypertrophy.     * Left ventricular systolic function is mildly reduced with an ejection   fraction of 50 % by Fay's.     * The anterior wall is hypokinetic. Abnormal paradoxical septal motion   consistent with right ventricular volume overload.     * Flattening of the septum in diastole and systole consistent with right   ventricular volume and pressure overload.     * Right ventricular cavity size is severely dilated with severely reduced   systolic function. FAC 22%.     * Right atrial chamber volume is severely enlarged (69 ml/m2).  Prominent   chiari network.     * There is mild eccentric mitral valve regurgitation.     * There is moderate tricuspid valve regurgitation.     * There is moderate pulmonic regurgitation.     * Across PV  peak velocity of 3.38 m/s, peak  gradient of 46 mmHg; mean   gradient of 18 mmHg and PVA of 1.72 cm2. Increased gradients could be partly   from increase strike volume from PV regurgitation.     * Moderate pulmonary hypertension, estimated pulmonary arterial systolic   pressure is 64 mmHg.     * There is trivial pericardial effusion. Comparison     * Previous echo from 18 indicated a postive bubble study, EF of 50%,   severe RV dilatation with moderately reduced function, pulmonic stenosis with   a peak velocity of 3.2 m/; MG of 23 mmHg, moderate PI/TR, and PAP of 65 mmHg. Patient Info   Name:     Maxine Howell   Age:     80 years   :     1940   Gender:     Male   MRN:     [id number]   Ht:     62 in   Wt:     150 lb   BSA:     1.74 m2   HR:     66 bpm   BP:     167   /     89 mmHg   Exam Date:     2021 8:59 AM   Site Location:     [facility]   Exam Location:     [facility]   Patient Status:     Inpatient     Exam Type:     ECHO CARDIOGRAM COMPLETE     Indications        - shortness of breath       Left Ventricle     Left ventricular chamber volume is normal with moderate concentric   hypertrophy.     Left ventricular systolic function is mildly reduced with an ejection   fraction of 50 % by Fay's.     The anterior wall is hypokinetic. Abnormal paradoxical septal motion   consistent with right ventricular volume overload.     Flattening of the septum in diastole and systole consistent with right   ventricular volume and pressure overload.     Left ventricular diastolic function: indeterminate. Right Ventricle     Right ventricular cavity size is severely dilated with severely reduced   systolic function. FAC 22%. Left Atrium     Left atrial chamber is mildly enlarged with a left atrial volume index of   38.00 ml/m^2 by BP MOD. Right Atrium     Right atrial chamber volume is severely enlarged (69 ml/m2). Prominent   chiari network.     Right atrial chamber volume is severely enlarged.      Atrial Septum   Previously positive bubble study (2/4/18). Aortic Valve     The aortic valve is tricuspid.     There is no aortic valve stenosis.     There is trace aortic valve regurgitation. Pulmonic Valve     The pulmonic valve is mildly thickened with what appears normal excursion.     Across PV  peak velocity of 3.38 m/s, peak  gradient of 46 mmHg; mean   gradient of 18 mmHg and PVA of 1.72 cm2. Increased gradients could be partly   from increase strike volume from PV regurgitation.     There is moderate pulmonic regurgitation. Mitral Valve     The mitral valve has normal leaflets.     There is no mitral valve stenosis.     There is mild eccentric mitral valve regurgitation. Tricuspid Valve     The tricuspid valve leaflets are normal and thickened. There is leaflet   prolapse seen.     There is no tricuspid valve stenosis.     There is moderate tricuspid valve regurgitation.     Moderate pulmonary hypertension, estimated pulmonary arterial systolic   pressure is 64 mmHg. Pericardium/Pleural     There is trivial pericardial effusion. Inferior Vena Cava     Dilated inferior vena cava with >50% collapse upon inspiration consistent   with elevated right atrial pressure, 8 mmHg. Aorta     The prox ascending aorta is mildly dilated measuring 3.6 cm with an index of   2.06 cm/m2.     The aortic root at the sinus of Valsalva is normal measuring 3.2 cm with an   index of 1.83 cm/m2.     The aortic measurements are indexed to age and body surface area.        Pulmonic Valve   ----------------------------------------------------------------------   Name                                 Value        Normal   ----------------------------------------------------------------------     PV 2D   ----------------------------------------------------------------------   RVOT Diameter (2D)                 2.70 cm     1.70-2.70     RVOT Doppler   ---------------------------------------------------------------------- RVOT Peak Velocity             111.00 cm/s                 RVOT Mean Gradient                  2 mmHg                 RVOT Area                         5.73 cm2                   PV Doppler   ----------------------------------------------------------------------   PV Peak Velocity               337.84 cm/s                 PV Peak Gradient                   46 mmHg                 PV Mean Gradient                   18 mmHg                 PV Area (Cont Eq VTI)             1.72 cm2                 PV Area Index (Cont Eq VTI)    0.98 cm2/m2                 PV Area (Cont Eq Darnell)             1.88 cm2                 PV Area Index (Cont Eq Darnell)    1.08 cm2/m2     Mitral Valve   ----------------------------------------------------------------------   Name                                 Value        Normal   ----------------------------------------------------------------------     MV Doppler   ----------------------------------------------------------------------   MV Decel Haywood                   174 cm/s2                 MV PHT                               98 ms                 MV Area (PHT)                     2.24 cm2     6.74-6.31     MV Diastolic Function   ----------------------------------------------------------------------   MV E Peak Velocity              58.70 cm/s       <=50.00   MV A Peak Velocity             119.00 cm/s                 MV E/A                                0.49        <=1.60   MV Decel Time                       338 ms                   MV Annular TDI   ----------------------------------------------------------------------   MV Lateral e' Velocity           6.96 cm/s       >=10.00     Tricuspid Valve   ----------------------------------------------------------------------   Name                                 Value        Normal   ----------------------------------------------------------------------     TV Regurgitation Doppler ----------------------------------------------------------------------   TR Peak Velocity               373.00 cm/s      <=280.00     Estimated PAP/RSVP   ----------------------------------------------------------------------   RA Pressure                         8 mmHg            <8   PA Systolic Pressure               64 mmHg           <88   RV Systolic Pressure               64 mmHg           <36     Aorta   ----------------------------------------------------------------------   Name                                 Value        Normal   ----------------------------------------------------------------------     Ascending Aorta   ----------------------------------------------------------------------   Sinus of Valsalva Diameter          3.2 cm         <=3.7   Sinus of Valsalva Index         1.83 cm/m2        <=1.90   Prox Asc Ao Diameter                3.6 cm         <=3.4   Prox Asc Ao Diameter Index      2.06 cm/m2        <=1.70     Venous   ----------------------------------------------------------------------   Name                                 Value        Normal   ----------------------------------------------------------------------     IVC/SVC   ----------------------------------------------------------------------   IVC Diameter (Exp 2D)              2.30 cm        <=2.10   IVC Diameter Percent Change   (2D)                                  43 %          >=50     Ventricles   ----------------------------------------------------------------------   Name                                 Value        Normal   ----------------------------------------------------------------------     LV Dimensions 2D/MM   ----------------------------------------------------------------------   IVS Diastolic Thickness (2D)       1.50 cm        <=1.04   LVID Diastole (2D)                 4.70 cm     7.59-9.82   LVPW Diastolic Thickness   (2D)                               1.50 cm        <=1.04   LVID Systole (2D)                  3.20 cm     2.28-0.79   LVID Diastolic Index (2D)       2.69 cm/m2     2.20-3.00   LV Mass (2D Cubed)                294.05 g  88..00   LV Mass Index (2D Cubed)        0.02 g/cm2     0.00-0.01   Relative Wall Thickness (2D)          0.64                   LV Fractional Shortening/Ejection Fraction 2D/MM   ----------------------------------------------------------------------   LV Diastolic Length (4C)           6.90 cm                 LV Systolic Length (4C)            6.05 cm                 LV Stroke Volume (4C MOD)         39.40 ml                   RV Dimensions 2D/MM   ----------------------------------------------------------------------   RV Basal Diastolic Dimension       9.66 cm     4.68-0.23   RV Diastolic Area (4C)           43.70 cm2   47.01-53.55   RV Systolic Area (4C)            34.10 cm2    3.00-15.00   TAPSE                               2.2 cm         >=1.7     RV Fractional Shortening 2D   ----------------------------------------------------------------------   RV FAC (4C)                           22 %          >=35     Atria   ----------------------------------------------------------------------   Name                                 Value        Normal   ----------------------------------------------------------------------     LA Dimensions   ----------------------------------------------------------------------   LA Dimension (2D)                  5.60 cm     3.00-4.10   LA Dimen Index (2D)             3.21 cm/m2                 LA Volume Index (BP A-L)       37.84 ml/m2        <35.00   LA Volume Index (BP MOD)       38.00 ml/m2   16.00-34.00     RA Dimensions   ----------------------------------------------------------------------   RA Diastolic Major Axis   Length (4C)                        7.19 cm                 RA Diastolic Major Axis   Length Index (4C)                     0.41                 RA Area (4C)                     33.00 cm2        <21.00   RA ESV (4C MOD)                  116.00 ml   18.00-32.00   RA ESV Index (4C MOD)          66.50 ml/m2       <=32.00       Report Signatures   Finalized by Sheryle Square MD on 4/30/2021 12:00 PM       Technical Quality:     Fair     Staff   Ordering Provider:     Ericka Staples A     Complete two-dimensional, color flow and Doppler transthoracic echocardiogram   is performed. [id number]     Sonographer:     Leobardo Monahan [facility]  Other Result Text    This result has an attachment that is not available. Interface, Stacy White - 04/30/2021 12:00 PM EDT   Formatting of this note might be different from the original.   CONCLUSIONS     * Left ventricular chamber volume is normal with moderate concentric   hypertrophy.     * Left ventricular systolic function is mildly reduced with an ejection   fraction of 50 % by Fay's.     * The anterior wall is hypokinetic. Abnormal paradoxical septal motion   consistent with right ventricular volume overload.     * Flattening of the septum in diastole and systole consistent with right   ventricular volume and pressure overload.     * Right ventricular cavity size is severely dilated with severely reduced   systolic function. FAC 22%.     * Right atrial chamber volume is severely enlarged (69 ml/m2). Prominent   chiari network.     * There is mild eccentric mitral valve regurgitation.     * There is moderate tricuspid valve regurgitation.     * There is moderate pulmonic regurgitation.     * Across PV  peak velocity of 3.38 m/s, peak  gradient of 46 mmHg; mean   gradient of 18 mmHg and PVA of 1.72 cm2. Increased gradients could be partly   from increase strike volume from PV regurgitation.     * Moderate pulmonary hypertension, estimated pulmonary arterial systolic   pressure is 64 mmHg.     * There is trivial pericardial effusion.      Comparison     * Previous echo from 2/7/18 indicated a postive bubble study, EF of 50%,   severe RV dilatation with moderately reduced function, pulmonic stenosis with   a peak velocity of 3.2 m/; MG of 23 mmHg, moderate PI/TR, and PAP of 65 mmHg. Patient Info   Name:     Dago James   Age:     80 years   :     1940   Gender:     Male   MRN:     [id number]   Ht:     62 in   Wt:     150 lb   BSA:     1.74 m2   HR:     66 bpm   BP:     167   /     89 mmHg   Exam Date:     2021 8:59 AM   Site Location:     [facility]   Exam Location:     [facility]   Patient Status:     Inpatient     Exam Type:     ECHO CARDIOGRAM COMPLETE     Indications        - shortness of breath       Left Ventricle     Left ventricular chamber volume is normal with moderate concentric   hypertrophy.     Left ventricular systolic function is mildly reduced with an ejection   fraction of 50 % by Fay's.     The anterior wall is hypokinetic. Abnormal paradoxical septal motion   consistent with right ventricular volume overload.     Flattening of the septum in diastole and systole consistent with right   ventricular volume and pressure overload.     Left ventricular diastolic function: indeterminate. Right Ventricle     Right ventricular cavity size is severely dilated with severely reduced   systolic function. FAC 22%. Left Atrium     Left atrial chamber is mildly enlarged with a left atrial volume index of   38.00 ml/m^2 by BP MOD. Right Atrium     Right atrial chamber volume is severely enlarged (69 ml/m2). Prominent   chiari network.     Right atrial chamber volume is severely enlarged. Atrial Septum     Previously positive bubble study (18). Aortic Valve     The aortic valve is tricuspid.     There is no aortic valve stenosis.     There is trace aortic valve regurgitation. Pulmonic Valve     The pulmonic valve is mildly thickened with what appears normal excursion.     Across PV  peak velocity of 3.38 m/s, peak  gradient of 46 mmHg; mean   gradient of 18 mmHg and PVA of 1.72 cm2.  Increased gradients could be partly   from increase strike volume from PV regurgitation.     There is moderate pulmonic regurgitation. Mitral Valve     The mitral valve has normal leaflets.     There is no mitral valve stenosis.     There is mild eccentric mitral valve regurgitation. Tricuspid Valve     The tricuspid valve leaflets are normal and thickened. There is leaflet   prolapse seen.     There is no tricuspid valve stenosis.     There is moderate tricuspid valve regurgitation.     Moderate pulmonary hypertension, estimated pulmonary arterial systolic   pressure is 64 mmHg. Pericardium/Pleural     There is trivial pericardial effusion. Inferior Vena Cava     Dilated inferior vena cava with >50% collapse upon inspiration consistent   with elevated right atrial pressure, 8 mmHg. Aorta     The prox ascending aorta is mildly dilated measuring 3.6 cm with an index of   2.06 cm/m2.     The aortic root at the sinus of Valsalva is normal measuring 3.2 cm with an   index of 1.83 cm/m2.     The aortic measurements are indexed to age and body surface area.        Pulmonic Valve   ----------------------------------------------------------------------   Name                                 Value        Normal   ----------------------------------------------------------------------     PV 2D   ----------------------------------------------------------------------   RVOT Diameter (2D)                 2.70 cm     1.70-2.70     RVOT Doppler   ----------------------------------------------------------------------   RVOT Peak Velocity             111.00 cm/s                 RVOT Mean Gradient                  2 mmHg                 RVOT Area                         5.73 cm2                   PV Doppler   ----------------------------------------------------------------------   PV Peak Velocity               337.84 cm/s                 PV Peak Gradient                   46 mmHg                 PV Mean Gradient                   18 mmHg                 PV Area (Cont Eq VTI)             1.72 cm2                 PV Area Index (Cont Eq VTI)    0.98 cm2/m2                 PV Area (Cont Eq Darnell)             1.88 cm2                 PV Area Index (Cont Eq Darnell)    1.08 cm2/m2     Mitral Valve   ----------------------------------------------------------------------   Name                                 Value        Normal   ----------------------------------------------------------------------     MV Doppler   ----------------------------------------------------------------------   MV Decel Faulkner                   174 cm/s2                 MV PHT                               98 ms                  MV Area (PHT)                     2.24 cm2     8.60-6.76     MV Diastolic Function   ----------------------------------------------------------------------   MV E Peak Velocity              58.70 cm/s       <=50.00   MV A Peak Velocity             119.00 cm/s                 MV E/A                                0.49        <=1.60   MV Decel Time                       338 ms                   MV Annular TDI   ----------------------------------------------------------------------   MV Lateral e' Velocity           6.96 cm/s       >=10.00     Tricuspid Valve   ----------------------------------------------------------------------   Name                                 Value        Normal   ----------------------------------------------------------------------     TV Regurgitation Doppler   ----------------------------------------------------------------------   TR Peak Velocity               373.00 cm/s      <=280.00     Estimated PAP/RSVP   ----------------------------------------------------------------------   RA Pressure                         8 mmHg            <8   PA Systolic Pressure               64 mmHg           <20   RV Systolic Pressure               64 mmHg           <36     Aorta   ----------------------------------------------------------------------   Name                                 Value        Normal   ----------------------------------------------------------------------     Ascending Aorta   ----------------------------------------------------------------------   Sinus of Valsalva Diameter          3.2 cm         <=3.7   Sinus of Valsalva Index         1.83 cm/m2        <=1.90   Prox Asc Ao Diameter                3.6 cm         <=3.4   Prox Asc Ao Diameter Index      2.06 cm/m2        <=1.70     Venous   ----------------------------------------------------------------------   Name                                 Value        Normal   ----------------------------------------------------------------------     IVC/SVC   ----------------------------------------------------------------------   IVC Diameter (Exp 2D)              2.30 cm        <=2.10   IVC Diameter Percent Change   (2D)                                  43 %          >=50     Ventricles   ----------------------------------------------------------------------   Name                                 Value        Normal   ----------------------------------------------------------------------     LV Dimensions 2D/MM   ----------------------------------------------------------------------   IVS Diastolic Thickness (2D)       1.50 cm        <=1.04   LVID Diastole (2D)                 4.70 cm     4.34-8.31   LVPW Diastolic Thickness   (2D)                               1.50 cm        <=1.04   LVID Systole (2D)                  3.20 cm     7.68-7.95   LVID Diastolic Index (2D)       2.69 cm/m2     2.20-3.00   LV Mass (2D Cubed)                294.05 g  88..00   LV Mass Index (2D Cubed)        0.02 g/cm2     0.00-0.01   Relative Wall Thickness (2D)          0.64                   LV Fractional Shortening/Ejection Fraction 2D/MM   ----------------------------------------------------------------------   LV Diastolic Length (4C)           6.90 cm                 LV Systolic Length (4C)            6.05 cm                 LV Stroke Volume (4C MOD)         39.40 ml                   RV Dimensions 2D/MM   ----------------------------------------------------------------------   RV Basal Diastolic Dimension       6.91 cm     6.17-4.73   RV Diastolic Area (4C)           43.70 cm2   88.18-59.00   RV Systolic Area (4C)            34.10 cm2    3.00-15.00   TAPSE                               2.2 cm         >=1.7     RV Fractional Shortening 2D   ----------------------------------------------------------------------   RV FAC (4C)                           22 %          >=35     Atria   ----------------------------------------------------------------------   Name                                 Value        Normal   ----------------------------------------------------------------------     LA Dimensions   ----------------------------------------------------------------------   LA Dimension (2D)                  5.60 cm     3.00-4.10   LA Dimen Index (2D)             3.21 cm/m2                 LA Volume Index (BP A-L)       37.84 ml/m2        <35.00   LA Volume Index (BP MOD)       38.00 ml/m2   16.00-34.00     RA Dimensions   ----------------------------------------------------------------------   RA Diastolic Major Axis   Length (4C)                        7.19 cm                 RA Diastolic Major Axis   Length Index (4C)                     0.41                 RA Area (4C)                     33.00 cm2        <21.00   RA ESV (4C MOD)                  116.00 ml   18.00-32.00   RA ESV Index (4C MOD)          66.50 ml/m2       <=32.00       Report Signatures   Finalized by Adrianna Fink MD on 4/30/2021 12:00 PM       Technical Quality:     Fair     Staff   Ordering Provider:     Charlie Bowman A     Complete two-dimensional, color flow and Doppler transthoracic echocardiogram   is performed.      [id number]     Sonographer:     Raj Aase RCS     Date: 04/30/21   Received From: [address]       CT CHEST-NO IV/ORAL CONTRAST    Impression    Impression:   Multifocal infiltrates in the left upper lobe, lingula, and left lower lobe, consistent with multifocal pneumonia. Right middle lobe nodular density measuring approximately 2.0 x 1.6 cm on axial image 61. This is at the site of the prior pneumonia and immediately abuts the right hilum. This likely represents a growing pulmonary nodule. Further evaluation with PET CT or endobronchial ultrasound guided biopsy would be suggested. Mild mediastinal lymphadenopathy, similar to prior. Aspirated secretions in the right main bronchus. Stable left adrenal myolipoma. Right thyroid nodule. Narrative    CT chest without enhancement     INDICATION: Fever with productive cough for one week. COMPARISON: July 25, 2016. Chest radiograph dated February 7, 2018     TECHNIQUE: 5 mm collimation axial images obtained from the thoracic inlet to the level of the diaphragm without administration of nonionic intravenous contrast.     Dose reduction techniques used: automated exposure control, adjustment of the mAs and/or kVp according to patient size, standardized low dose protocol, and/or iterative reconstruction technique. CHEST FINDINGS:     Lymph nodes: Mild mediastinal lymphadenopathy. Prominent AP window lymph node measuring 1.1 cm. Prominent precarinal lymph node measuring 1.3 cm. Thyroid:  Right thyroid nodule is more conspicuous than on prior, but likely similar. This measures approximately 1.5 cm. Mediastinum: Heart size is mildly enlarged. There is trace pericardial fluid. There is minimal calcified coronary artery disease and atherosclerotic disease. The esophagus is under distended. Lungs: There are patchy infiltrates in the left upper lobe and lingula. There are also some patchy infiltrates in the medial left lower lobe. There is scarring and volume loss of the right middle lobe.  There is a nodular density near the right hilum and the right middle lobe measuring approximately 2.0 x 1.6 cm on axial image 61. There are aspirated secretions in the right mainstem bronchus. There is atelectasis in the right lower lobe. There is no pleural effusion or pneumothorax. Abdomen:   Large left adrenal mass is again seen measuring approximately 4.1 cm x 3.1 cm. This was previously characterized as a benign adrenal myolipoma. Cystic structure in the upper pole of the right kidney is incompletely visualized but appears to measure 2.3 cm. .     No chest wall mass. Bones: No suspicious osseous lesion. Degenerative changes of the spine. Other Result Text    Interface, [facility] Rad Res - 02/07/2018  8:31 AM EST   Formatting of this note might be different from the original.   CT chest without enhancement     INDICATION: Fever with productive cough for one week. COMPARISON: July 25, 2016. Chest radiograph dated February 7, 2018     TECHNIQUE: 5 mm collimation axial images obtained from the thoracic inlet to the level of the diaphragm without administration of nonionic intravenous contrast.     Dose reduction techniques used: automated exposure control, adjustment of the mAs and/or kVp according to patient size, standardized low dose protocol, and/or iterative reconstruction technique. CHEST FINDINGS:     Lymph nodes: Mild mediastinal lymphadenopathy. Prominent AP window lymph node measuring 1.1 cm. Prominent precarinal lymph node measuring 1.3 cm. Thyroid:  Right thyroid nodule is more conspicuous than on prior, but likely similar. This measures approximately 1.5 cm. Mediastinum: Heart size is mildly enlarged. There is trace pericardial fluid. There is minimal calcified coronary artery disease and atherosclerotic disease. The esophagus is under distended. Lungs: There are patchy infiltrates in the left upper lobe and lingula. There are also some patchy infiltrates in the medial left lower lobe.  There is scarring and volume loss of the right middle lobe. There is a nodular density near the right hilum and the right middle lobe measuring approximately 2.0 x 1.6 cm on axial image 61. There are aspirated secretions in the right mainstem bronchus. There is atelectasis in the right lower lobe. There is no pleural effusion or pneumothorax. Abdomen:   Large left adrenal mass is again seen measuring approximately 4.1 cm x 3.1 cm. This was previously characterized as a benign adrenal myolipoma. Cystic structure in the upper pole of the right kidney is incompletely visualized but appears to measure 2.3 cm. .     No chest wall mass. Bones: No suspicious osseous lesion. Degenerative changes of the spine. IMPRESSION   Impression:   Multifocal infiltrates in the left upper lobe, lingula, and left lower lobe, consistent with multifocal pneumonia. Right middle lobe nodular density measuring approximately 2.0 x 1.6 cm on axial image 61. This is at the site of the prior pneumonia and immediately abuts the right hilum. This likely represents a growing pulmonary nodule. Further evaluation with PET CT or endobronchial ultrasound guided biopsy would be suggested. Mild mediastinal lymphadenopathy, similar to prior. Aspirated secretions in the right main bronchus. Stable left adrenal myolipoma. Right thyroid nodule. Ambulatory oxymetry and titration per nurse note. ASSESSMENT and PLAN  Encounter Diagnoses   Name Primary?  Lung nodule Yes    Heart failure with preserved ejection fraction, unspecified HF chronicity (HCC)     Pulmonary hypertension (HCC)     Hypoxemia requiring supplemental oxygen        Pt with chronic hypoxemia and pulmonary hypertension likely due to chronic heart failure (Grp 2 pulmonary hypertension), however will need to R/o chronic lung disease given pt's smoking history. Suspect R heart volume and pressure overload due to valvular heart disease and an intracardiac shunt (positive bubble study).  Possible contribution from chronic kidney disease. Schedule full PFT's. Decision on bronchodilators pending PFT  DME written for portable O2 concentrator. Schedule CT chest without contrast to follow lung nodule demonstrated on CT from 2018. Pt to return after above tests as further intervention pending results.

## 2021-09-15 NOTE — PROGRESS NOTES
Estephania Noriega presents today for   Chief Complaint   Patient presents with    Shortness of Breath       Is someone accompanying this pt? Sister    Is the patient using any DME equipment during [address]? Oxygen     -D[employer]    Depression Screening:  3 most recent PHQ Screens 7/20/2021   Little interest or pleasure in doing things Not at all   Feeling down, depressed, irritable, or hopeless Not at all   Total Score PHQ 2 0       Learning Assessment:  Learning Assessment 6/12/2019   PRIMARY LEARNER Patient   HIGHEST LEVEL OF EDUCATION - PRIMARY LEARNER  -   BARRIERS PRIMARY LEARNER -   CO-LEARNER CAREGIVER -   PRIMARY LANGUAGE ENGLISH   LEARNER PREFERENCE PRIMARY DEMONSTRATION   ANSWERED BY pt   RELATIONSHIP SELF       Abuse Screening:  Abuse Screening Questionnaire 5/10/2018   Do you ever feel afraid of your partner? N   Are you in a relationship with someone who physically or mentally threatens you? N   Is it safe for you to go home? Y       Fall Risk  Fall Risk Assessment, last 12 mths 7/20/2021   Able to walk? Yes   Fall in past 12 months? 0   Number of falls in past 12 months -   Fall with injury? -         Coordination of Care:  1. Have you been to the ER, urgent care clinic since your last visit? Hospitalized since your last visit? No    2.  Have you seen or consulted any other health care providers outside of the [address] [place] [address] since your last visit No

## 2021-09-15 NOTE — PROGRESS NOTES
[facility]  [address] [facility], [address] [place] [address]  () [phone]      Simple walk test done in office today. Qualifying O2 sats:     O2 Sat at rest on room air is: 80 %, Pulse 87, SOB scale 1                  O2 Sat on 2L O2 is : 88 %, Pulse 66, SOB  @ Rest     Walked: 34   m on 3L O2 : 93 %, Pulse 76, SOB scale 2      68   m on 3L O2 : 91 %, Pulse 80, SOB scale 3         Tech comments regarding testing: Patient  Unable to continue walking   C/O   Feeling tired  Dr Jc Warner informed.      Denyce Leventhal, LPN

## 2021-09-15 NOTE — LETTER
9/15/2021    Patient: Matthew Syed   YOB: 1940   Date of Visit: 9/15/2021     Reilly Baptiste MD  [facility] [address]  [place] [address]  [address]    Dear Reilly Baptiste MD,      Thank you for referring Mr. Binh Moreno to [address] [place] [address] for evaluation. My notes for this consultation are attached. If you have questions, please do not hesitate to call me. I look forward to following your patient along with you.       Sincerely,    Jada Leal MD

## 2021-09-16 ENCOUNTER — TELEPHONE (OUTPATIENT)
Dept: PULMONOLOGY | Age: 81
End: 2021-09-16

## 2021-09-16 DIAGNOSIS — I27.20 PULMONARY HYPERTENSION (HCC): ICD-10-CM

## 2021-09-16 DIAGNOSIS — R09.02 HYPOXEMIA REQUIRING SUPPLEMENTAL OXYGEN: ICD-10-CM

## 2021-09-16 DIAGNOSIS — Z99.81 HYPOXEMIA REQUIRING SUPPLEMENTAL OXYGEN: ICD-10-CM

## 2021-09-16 DIAGNOSIS — R91.1 LUNG NODULE: Primary | ICD-10-CM

## 2021-09-16 DIAGNOSIS — I50.30 HEART FAILURE WITH PRESERVED EJECTION FRACTION, UNSPECIFIED HF CHRONICITY (HCC): ICD-10-CM

## 2021-09-16 NOTE — TELEPHONE ENCOUNTER
Gisell from [facility] [phone]. They received an order for O2 but the patient's insurance in not in their network. Please inform Dr Hans Jane.

## 2021-09-17 NOTE — TELEPHONE ENCOUNTER
Order placed for home oxygen therapy, per Verbal Order from Dr. Caitlyn Flores on 9/17/2021. Last office visit: 9/15/2021  Follow up Visit: Due Dec 2021    Provider is aware of last office visit and follow up. No further action requested from provider.

## 2021-09-20 ENCOUNTER — TELEPHONE (OUTPATIENT)
Dept: PULMONOLOGY | Age: 81
End: 2021-09-20

## 2021-09-20 NOTE — TELEPHONE ENCOUNTER
Justin Mena from   [facility] LUIS ARMANDO([phone])   Pt told her that an POC order was sent to another [employer] company and he wants order sent to [facility] because they handle his O2.

## 2021-09-20 NOTE — TELEPHONE ENCOUNTER
Pt now calling to speak with someone re his oxygen. He stated that the order was sent to the wrong place. He would also like to check status of CT scan that was ordered that he would like to have done at [facility]. Pt informed he will need to get copy of disc.  Please call pt [phone]

## 2021-09-20 NOTE — TELEPHONE ENCOUNTER
Oxygen order has been sent to [facility]. CT Order faxed to [facility]. Called patient, no answer, left message requesting return call.

## 2021-09-21 ENCOUNTER — TELEPHONE (OUTPATIENT)
Dept: PULMONOLOGY | Age: 81
End: 2021-09-21

## 2021-09-21 NOTE — TELEPHONE ENCOUNTER
Michelle from [employer] called to let us know that Dr Benigno Chirinos needs to fax an order for CT to [address] [place] [address] at [phone].

## 2021-09-28 ENCOUNTER — PATIENT OUTREACH (OUTPATIENT)
Dept: CASE MANAGEMENT | Age: 81
End: 2021-09-28

## 2021-09-28 NOTE — PROGRESS NOTES
Complex Case Management      Date/Time:  9/28/2021 4:01 PM     Attempted to reach patient by telephone. Left HIPPA compliant message requesting a return call. Will attempt to reach patient at a later time.

## 2021-10-06 ENCOUNTER — PATIENT OUTREACH (OUTPATIENT)
Dept: CASE MANAGEMENT | Age: 81
End: 2021-10-06

## 2021-10-06 NOTE — PROGRESS NOTES
Complex Case Management      Date/Time:  10/6/2021 4:33 PM    Method of communication with patient:phone    [facility] contacted the patient by telephone to perform Ambulatory Care Coordination. Verified name and  (PHI) with patient as identifiers. Provided introduction to self, and explanation of the Ambulatory Care Manager's role. Reviewed most recent clinic visit w/ patient who verbalized understanding. Patient given an opportunity to ask questions. The patient agrees to contact the PCP office or the [facility] for questions related to their healthcare. Provided contact information for future reference. Disease Specific:   CHF - weight 148 lbs. States he had some shortness of breath earlier today while walking to and from the store but denies any shortness of breath at this time. Denies any edema or chest pain. Medication(s):   Medication reconciliation was performed with patient, who verbalizes understanding of administration of home medications. There were no barriers to obtaining medications identified at this time. Referral to Pharm D needed: no     Current Outpatient Medications   Medication Sig    potassium chloride SR (KLOR-CON 10) 10 mEq tablet TAKE 1 TABLET BY MOUTH EVERY DAY    magnesium oxide (MAG-OX) 400 mg tablet TAKE 1 TABLET BY MOUTH EVERY DAY    Oxygen 3LPM continuous    finasteride (PROSCAR) 5 mg tablet Take 1 Tablet by mouth daily.  tamsulosin (FLOMAX) 0.4 mg capsule Take 1 Capsule by mouth daily.  aspirin delayed-release 81 mg tablet TAKE 1 TABLET BY MOUTH EVERY DAY    atorvastatin (LIPITOR) 40 mg tablet TAKE 1 TABLET BY MOUTH EVERY DAY    amLODIPine (NORVASC) 10 mg tablet TAKE 1 TABLET BY MOUTH DAILY    amiodarone (CORDARONE) 200 mg tablet Take 0.5 Tabs by mouth daily.  carvediloL (COREG) 12.5 mg tablet Take 1 Tab by mouth two (2) times a day.  isosorbide mononitrate ER (IMDUR) 30 mg tablet TAKE 1 TABLET BY MOUTH EVERY DAY. PLEASE SCHEDULE FOLLOW UP WITH DOCTOR    OLANZapine (ZYPREXA) 2.5 mg tablet Take 2.5 mg by mouth daily. Take 1 tab PO at bedtime    furosemide (LASIX) 20 mg tablet Take 1 Tablet by mouth daily. (Patient not taking: Reported on 9/2/2021)    Eliquis 5 mg tablet TAKE 1 TABLET BY MOUTH TWICE DAILY (Patient not taking: Reported on 8/4/2021)    olmesartan (BENICAR) 40 mg tablet Take 40 mg by mouth daily. Indications: high blood pressure (Patient not taking: Reported on 8/4/2021)    OLANZapine (ZyPREXA) 5 mg tablet Take 5 mg by mouth nightly. No current facility-administered medications for this visit. [facility] follow up appointment(s):   Future Appointments   Date Time Provider Solitario Fuchs   10/7/2021  1:00 PM [facility] CT RM 2 [facility]   10/20/2021  1:30 PM Keith Ann MD [facility]   12/27/2021  2:40 PM Cherelle Horner PA-C [address]        Non-[facility] follow up appointment(s): NA    Goals Addressed                 This Visit's Progress     Prevent Heart Failure worsening   On track     Patient will begin an exercise program- cycling 15 min. , 3 times a week. Will slowly increase time and days. -Adhere to cardiac diet- low fat, low cholesterol, no added salt  -Patient will assess heart failure zone daily and notify physician if  in Yellow zone    Green zone. · weight is stable. This means it is not going up or down. · breathing easily. · sleeping well, able to lie flat without shortness of breath. · can perform usual activities. Yellow zone. symptoms are changing. Call doctor. · new or increased shortness of breath. · dizzy or lightheaded, feeling faint. · sudden weight gain, such as 3 pounds or more in 2 to 3 days. · increased swelling in legs, ankles, or feet. · to tired or weak , unable to perform usual activities. · not sleeping well. Shortness of breath at night. need for extra pillows. Red zone. This is an emergency.  Call 911.                 symptoms of sudden heart failure, such as:  · severe trouble breathing. · coughing up pink, foamy mucus. · a new irregular or fast heartbeat. Patient will make and go to all appointments     Patient will be able to verbalize HF zones and when to contact physician by 7/30/21            States he has not been cycling as much as he was previously.

## 2021-10-07 ENCOUNTER — HOSPITAL ENCOUNTER (OUTPATIENT)
Dept: CT IMAGING | Age: 81
Discharge: HOME | End: 2021-10-07
Attending: INTERNAL MEDICINE
Payer: MEDICARE

## 2021-10-07 DIAGNOSIS — R91.1 LUNG NODULE: ICD-10-CM

## 2021-10-07 PROCEDURE — 71250 CT THORAX DX C-: CPT

## 2021-10-14 ENCOUNTER — TELEPHONE (OUTPATIENT)
Dept: INTERNAL MEDICINE CLINIC | Age: 81
End: 2021-10-14

## 2021-10-14 ENCOUNTER — TELEPHONE (OUTPATIENT)
Dept: CARDIOLOGY CLINIC | Age: 81
End: 2021-10-14

## 2021-10-14 DIAGNOSIS — I10 ESSENTIAL HYPERTENSION: Chronic | ICD-10-CM

## 2021-10-14 DIAGNOSIS — I25.10 CHRONIC CORONARY ARTERY DISEASE: Chronic | ICD-10-CM

## 2021-10-14 RX ORDER — CARVEDILOL 12.5 MG/1
TABLET ORAL
Qty: 180 TABLET | Refills: 3 | Status: SHIPPED | OUTPATIENT
Start: 2021-10-14 | End: 2022-03-09 | Stop reason: SDUPTHER

## 2021-10-14 NOTE — TELEPHONE ENCOUNTER
Patient is calling in he still does not have the portal oxygen so he is not sure if her will be able to keep his appt next week. States he keeps calling the Pulmonary office and they just keep telling him they haven't heard back from his [employer]  Wants to know if there is anything that we can do to help.

## 2021-10-14 NOTE — TELEPHONE ENCOUNTER
Patient has refill request in the system already but cannot set up an appointment at this time because his oxygen tank is only good for an hour at a time.  Working with pcp to get a portable one

## 2021-10-15 ENCOUNTER — TELEPHONE (OUTPATIENT)
Dept: PULMONOLOGY | Age: 81
End: 2021-10-15

## 2021-10-15 NOTE — TELEPHONE ENCOUNTER
Called pt and left message. Call back number left and I myself or one of the other nurses will attempt to contact again. The call was to inform pt to f/u with Pulmonology  For this order.

## 2021-10-15 NOTE — TELEPHONE ENCOUNTER
Attempted to contact pt at  number, no answer. Lvm for pt to return call to office at [phone]. Will continue to try to contact pt.

## 2021-10-15 NOTE — TELEPHONE ENCOUNTER
Pt called [facility] because he hasn't heard anything about getting portable oxygen. [facility] state they need an order. Looks like order was sent to [facility], not [facility]. Please check on this and contact pt back.

## 2021-10-20 ENCOUNTER — HOSPITAL ENCOUNTER (OUTPATIENT)
Dept: LAB | Age: 81
Discharge: HOME | End: 2021-10-20
Payer: MEDICARE

## 2021-10-20 ENCOUNTER — OFFICE VISIT (OUTPATIENT)
Dept: INTERNAL MEDICINE CLINIC | Age: 81
End: 2021-10-20
Payer: MEDICARE

## 2021-10-20 VITALS
WEIGHT: 154 LBS | HEART RATE: 63 BPM | HEIGHT: 62 IN | RESPIRATION RATE: 18 BRPM | OXYGEN SATURATION: 94 % | SYSTOLIC BLOOD PRESSURE: 143 MMHG | DIASTOLIC BLOOD PRESSURE: 80 MMHG | BODY MASS INDEX: 28.34 KG/M2 | TEMPERATURE: 96.4 F

## 2021-10-20 DIAGNOSIS — I25.10 CHRONIC CORONARY ARTERY DISEASE: ICD-10-CM

## 2021-10-20 DIAGNOSIS — E11.22 TYPE 2 DIABETES MELLITUS WITH CHRONIC KIDNEY DISEASE, WITHOUT LONG-TERM CURRENT USE OF INSULIN, UNSPECIFIED CKD STAGE (HCC): ICD-10-CM

## 2021-10-20 DIAGNOSIS — I48.0 PAROXYSMAL ATRIAL FIBRILLATION (HCC): ICD-10-CM

## 2021-10-20 DIAGNOSIS — I10 ESSENTIAL HYPERTENSION: ICD-10-CM

## 2021-10-20 DIAGNOSIS — Z51.81 MEDICATION MONITORING ENCOUNTER: ICD-10-CM

## 2021-10-20 DIAGNOSIS — Z00.00 MEDICARE ANNUAL WELLNESS VISIT, SUBSEQUENT: Primary | ICD-10-CM

## 2021-10-20 DIAGNOSIS — J44.9 CHRONIC OBSTRUCTIVE PULMONARY DISEASE, UNSPECIFIED COPD TYPE (HCC): ICD-10-CM

## 2021-10-20 DIAGNOSIS — I50.9 CONGESTIVE HEART FAILURE (HCC): ICD-10-CM

## 2021-10-20 DIAGNOSIS — E78.5 HYPERLIPIDEMIA, UNSPECIFIED HYPERLIPIDEMIA TYPE: Chronic | ICD-10-CM

## 2021-10-20 LAB
ALBUMIN SERPL-MCNC: 4.3 G/DL (ref 3.4–5)
ALBUMIN/GLOB SERPL: 1.2 {RATIO} (ref 0.8–1.7)
ALP SERPL-CCNC: 120 U/L (ref 45–117)
ALT SERPL-CCNC: 53 U/L (ref 16–61)
ANION GAP SERPL CALC-SCNC: 9 MMOL/L (ref 3–18)
AST SERPL-CCNC: 34 U/L (ref 10–38)
BASOPHILS # BLD: 0.1 K/UL (ref 0–0.1)
BASOPHILS NFR BLD: 1 % (ref 0–2)
BILIRUB SERPL-MCNC: 1.7 MG/DL (ref 0.2–1)
BUN SERPL-MCNC: 20 MG/DL (ref 7–18)
BUN/CREAT SERPL: 13 (ref 12–20)
CALCIUM SERPL-MCNC: 9 MG/DL (ref 8.5–10.1)
CHLORIDE SERPL-SCNC: 104 MMOL/L (ref 100–111)
CHOLEST SERPL-MCNC: 97 MG/DL
CO2 SERPL-SCNC: 25 MMOL/L (ref 21–32)
CREAT SERPL-MCNC: 1.59 MG/DL (ref 0.6–1.3)
CREAT UR-MCNC: 128 MG/DL (ref 30–125)
DIFFERENTIAL METHOD BLD: ABNORMAL
EOSINOPHIL # BLD: 0.1 K/UL (ref 0–0.4)
EOSINOPHIL NFR BLD: 2 % (ref 0–5)
ERYTHROCYTE [DISTWIDTH] IN BLOOD BY AUTOMATED COUNT: 20 % (ref 11.6–14.5)
GLOBULIN SER CALC-MCNC: 3.7 G/DL (ref 2–4)
GLUCOSE SERPL-MCNC: 94 MG/DL (ref 74–99)
HBA1C MFR BLD: 5.3 % (ref 4.2–5.6)
HCT VFR BLD AUTO: 48.9 % (ref 36–48)
HDLC SERPL-MCNC: 51 MG/DL (ref 40–60)
HDLC SERPL: 1.9 {RATIO} (ref 0–5)
HGB BLD-MCNC: 15.9 G/DL (ref 13–16)
LDLC SERPL CALC-MCNC: 36.2 MG/DL (ref 0–100)
LIPID PROFILE,FLP: NORMAL
LYMPHOCYTES # BLD: 1.6 K/UL (ref 0.9–3.6)
LYMPHOCYTES NFR BLD: 23 % (ref 21–52)
MAGNESIUM SERPL-MCNC: 2.1 MG/DL (ref 1.6–2.6)
MCH RBC QN AUTO: 21.2 PG (ref 24–34)
MCHC RBC AUTO-ENTMCNC: 32.5 G/DL (ref 31–37)
MCV RBC AUTO: 65.3 FL (ref 78–100)
MICROALBUMIN UR-MCNC: 59.2 MG/DL (ref 0–3)
MICROALBUMIN/CREAT UR-RTO: 463 MG/G (ref 0–30)
MONOCYTES # BLD: 0.8 K/UL (ref 0.05–1.2)
MONOCYTES NFR BLD: 12 % (ref 3–10)
NEUTS SEG # BLD: 4.3 K/UL (ref 1.8–8)
NEUTS SEG NFR BLD: 62 % (ref 40–73)
PLATELET # BLD AUTO: 188 K/UL (ref 135–420)
PLATELET COMMENTS,PCOM: ABNORMAL
POTASSIUM SERPL-SCNC: 3.9 MMOL/L (ref 3.5–5.5)
PROT SERPL-MCNC: 8 G/DL (ref 6.4–8.2)
RBC # BLD AUTO: 7.49 M/UL (ref 4.35–5.65)
RBC MORPH BLD: ABNORMAL
SODIUM SERPL-SCNC: 138 MMOL/L (ref 136–145)
TRIGL SERPL-MCNC: 49 MG/DL (ref ?–150)
TSH SERPL DL<=0.05 MIU/L-ACNC: 7 UIU/ML (ref 0.36–3.74)
VLDLC SERPL CALC-MCNC: 9.8 MG/DL
WBC # BLD AUTO: 6.9 K/UL (ref 4.6–13.2)

## 2021-10-20 PROCEDURE — G8536 NO DOC ELDER MAL SCRN: HCPCS | Performed by: INTERNAL MEDICINE

## 2021-10-20 PROCEDURE — G8510 SCR DEP NEG, NO PLAN REQD: HCPCS | Performed by: INTERNAL MEDICINE

## 2021-10-20 PROCEDURE — 80061 LIPID PANEL: CPT

## 2021-10-20 PROCEDURE — G8753 SYS BP > OR = 140: HCPCS | Performed by: INTERNAL MEDICINE

## 2021-10-20 PROCEDURE — 85025 COMPLETE CBC W/AUTO DIFF WBC: CPT

## 2021-10-20 PROCEDURE — 83735 ASSAY OF MAGNESIUM: CPT

## 2021-10-20 PROCEDURE — 83036 HEMOGLOBIN GLYCOSYLATED A1C: CPT

## 2021-10-20 PROCEDURE — 84443 ASSAY THYROID STIM HORMONE: CPT

## 2021-10-20 PROCEDURE — G8417 CALC BMI ABV UP PARAM F/U: HCPCS | Performed by: INTERNAL MEDICINE

## 2021-10-20 PROCEDURE — 80053 COMPREHEN METABOLIC PANEL: CPT

## 2021-10-20 PROCEDURE — G0439 PPPS, SUBSEQ VISIT: HCPCS | Performed by: INTERNAL MEDICINE

## 2021-10-20 PROCEDURE — G8754 DIAS BP LESS 90: HCPCS | Performed by: INTERNAL MEDICINE

## 2021-10-20 PROCEDURE — 1101F PT FALLS ASSESS-DOCD LE1/YR: CPT | Performed by: INTERNAL MEDICINE

## 2021-10-20 PROCEDURE — G8427 DOCREV CUR MEDS BY ELIG CLIN: HCPCS | Performed by: INTERNAL MEDICINE

## 2021-10-20 PROCEDURE — 82043 UR ALBUMIN QUANTITATIVE: CPT

## 2021-10-20 PROCEDURE — 99214 OFFICE O/P EST MOD 30 MIN: CPT | Performed by: INTERNAL MEDICINE

## 2021-10-20 RX ORDER — AMIODARONE HYDROCHLORIDE 200 MG/1
100 TABLET ORAL DAILY
Qty: 15 TABLET | Refills: 0 | Status: SHIPPED | OUTPATIENT
Start: 2021-10-20 | End: 2021-11-19 | Stop reason: SDUPTHER

## 2021-10-20 NOTE — PROGRESS NOTES
The following is a separate encounter visit:  [address] [place] [address] Shakir Candelario is a 80 y.o. male. BP (!) 143/80 (BP 1 Location: Right arm, BP Patient Position: Sitting, BP Cuff Size: Adult)   Pulse 63   Temp (!) 96.4 °F (35.8 °C) (Temporal)   Resp 18   Ht 5' 2\" (1.575 m)   Wt 154 lb (69.9 kg)   SpO2 94%   BMI 28.17 kg/m²     Hypertension   The history is provided by the patient. This is a chronic problem. The current episode started more than 1 week ago. The problem has not changed since onset. Associated symptoms include shortness of breath. Pertinent negatives include no chest pain and no palpitations. Risk factors include diabetes mellitus and dyslipidemia. Diabetes  The history is provided by the patient. This is a chronic problem. The current episode started more than 1 week ago. The problem occurs daily. The problem has not changed since onset. Associated symptoms include shortness of breath. Pertinent negatives include no chest pain. Review of Systems   Constitutional: Negative for chills and fever. Respiratory: Positive for shortness of breath. Cardiovascular: Negative for chest pain, palpitations and leg swelling. Physical Exam  Vitals and nursing note reviewed. Constitutional:       General: He is not in acute distress. Appearance: Normal appearance. He is well-developed. He is not diaphoretic. HENT:      Head: Normocephalic and atraumatic. Cardiovascular:      Rate and Rhythm: Normal rate and regular rhythm. Pulmonary:      Effort: Pulmonary effort is normal.      Breath sounds: Normal breath sounds. Musculoskeletal:      Right lower leg: No edema. Left lower leg: No edema. Skin:     General: Skin is warm and dry. Neurological:      Mental Status: He is alert and oriented to person, place, and time.    Psychiatric:         Mood and Affect: Mood normal.         Behavior: Behavior normal.         ASSESSMENT and PLAN    ICD-10-CM ICD-9-CM 2. Essential hypertension  Z87 664.7 METABOLIC PANEL, COMPREHENSIVE      LIPID PANEL   3. Chronic obstructive pulmonary disease, unspecified COPD type ([facility])  J44.9 496 CBC WITH AUTOMATED DIFF   4. Chronic coronary artery disease  I25.10 414.00 LIPID PANEL      amiodarone (CORDARONE) 200 mg tablet   5. Type 2 diabetes mellitus with chronic kidney disease, without long-term current use of insulin, unspecified CKD stage ([facility])  V99.26 342.83 METABOLIC PANEL, COMPREHENSIVE     585.9 LIPID PANEL      MICROALBUMIN, UR, RAND W/ MICROALB/CREAT RATIO      HEMOGLOBIN A1C W/O EAG   6. Paroxysmal atrial fibrillation (HCC)  I48.0 427.31 TSH 3RD GENERATION   7. Hyperlipidemia, unspecified hyperlipidemia type  E78.5 272.4 amiodarone (CORDARONE) 200 mg tablet   8. Congestive heart failure (HCC)  I50.9 428.0 amiodarone (CORDARONE) 200 mg tablet   9. Medication monitoring encounter  F67.62 R40.47 METABOLIC PANEL, COMPREHENSIVE      CBC WITH AUTOMATED DIFF      TSH 3RD GENERATION      MAGNESIUM     He is almost out of amiodarone. Advises him I will give him a 30 d supply (reluctantly) so he can get to his cardiology appt. Update lab    BP borderline today    BS has been controlled    Asked pt to f/u with ophthalmology    F/u 6 months for HTN, DM, chol        This is the Subsequent Medicare Annual Wellness Exam, performed 12 months or more after the Initial AWV or the last Subsequent AWV    I have reviewed the patient's medical history in detail and updated the computerized patient record. Assessment/Plan   Education and counseling provided:  Are appropriate based on today's review and evaluation    1.  Medicare annual wellness visit, subsequent      Depression Risk Factor Screening     3 most recent PHQ Screens 10/20/2021   Little interest or pleasure in doing things Not at all   Feeling down, depressed, irritable, or hopeless Not at all   Total Score PHQ 2 0       Alcohol Risk Screen    Do you average more than 1 drink per night or more than 7 drinks a week: No    In the past three months have you have had more than 4 drinks containing alcohol on one occasion: No        Functional Ability and Level of Safety    Hearing: is supposed to wear hearing aides but does not      Activities of Daily Living: The home contains: grab bars and and has an alert necklace  Patient needs help with:  transportation, shopping, laundry and housework      Ambulation: with difficulty, uses a oxygen tanks     Fall Risk:  Fall Risk Assessment, last 12 mths 7/20/2021   Able to walk? Yes   Fall in past 12 months? 0   Number of falls in past 12 months -   Fall with injury? -      Abuse Screen:  Patient is not abused       Cognitive Screening    Has your family/caregiver stated any concerns about your memory: yes - he is aware his memory is a little slower     Cognitive Screening: Abnormal - Animal Naming Test--11/12.  Others after we timed out    Health Maintenance Due     Health Maintenance Due   Topic Date Due    Eye Exam Retinal or Dilated  Never done    Shingrix Vaccine Age 50> (1 of 2) Never done    DTaP/Tdap/Td series (2 - Td or Tdap) 06/01/2016    Foot Exam Q1  10/05/2021    MICROALBUMIN Q1  10/05/2021       Patient Care Team   Patient Care Team:  Aubrey Hallman MD as PCP - General (Internal Medicine)  Aubrey Hallman MD as PCP - [address] [place] [address]  [facility] Provider  General Connolly (Physician Assistant)  Kasey Patino MD (Pulmonary Disease)    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    Hypoxia R09.02    Heart valve disease I38    Gram-positive bacteremia R78.81    GERD (gastroesophageal reflux disease) K21.9    Syncope and collapse R55    SIRS (systemic inflammatory response syndrome) ([facility]) R65.10    Shortness of breath R06.02    Sensory hearing loss H90.5    Retention of urine R33.9    Refusal of blood transfusions as patient is Hinduism Z53.1    QT prolongation R94.31    Personal history of other medical treatment Z92.89    Palpitations R00.2    Non-adherence to medical treatment Z91.19     Past Medical History:   Diagnosis Date    Adrenal myelolipoma     Benign non-nodular prostatic hyperplasia with lower urinary tract symptoms     Beta thalassemia trait     CAD (coronary artery disease)     Cardiomyopathy ([facility])     EF 30%    Diabetes mellitus ([facility])     Diabetic nephropathy ([facility])     Diverticulosis     Elevated PSA     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  Faecalis    Enlarged prostate     GERD (gastroesophageal reflux disease)     Gout     Heart failure (HCC)     Hematuria     Hyperlipidemia     Hypertension     Incomplete emptying of bladder     Lacunar infarction (HCC)     left basal ganglia, right cerebellar    Other ill-defined conditions(799.89)     gout    PAF (paroxysmal atrial fibrillation) (HCC)     Pulmonary hypertension (HCC)     Rectal polyp     Right renal mass     Schizophrenia ([facility]     Stopped smoking with greater than 10 pack year history     Vitamin D deficiency     Weight loss, unintentional       Past Surgical History:   Procedure Laterality Date    HX COLONOSCOPY  04/17/2013    Dr. Oscar Dudley    HX ENDOSCOPY      HX ORTHOPAEDIC  2076'Z    wound repair L. arm    HX TURP  03/01/2017     TRANSURETHRAL RESECTION OF PROSTATE;  Surgeon: Jordy Olivo MD; [facility]     Current Outpatient Medications   Medication Sig Dispense Refill    amiodarone (CORDARONE) 200 mg tablet Take 0.5 Tablets by mouth daily. Indications: prevention of recurrent atrial fibrillation 15 Tablet 0    carvediloL (COREG) 12.5 mg tablet TAKE 1 TABLET BY MOUTH TWICE A  Tablet 3    isosorbide mononitrate ER (IMDUR) 30 mg tablet TAKE 1 TABLET BY MOUTH EVERY DAY. PLEASE SCHEDULE FOLLOW UP WITH DOCTOR 90 Tablet 0    potassium chloride SR (KLOR-CON 10) 10 mEq tablet TAKE 1 TABLET BY MOUTH EVERY DAY 90 Tablet 1    magnesium oxide (MAG-OX) 400 mg tablet TAKE 1 TABLET BY MOUTH EVERY DAY 90 Tablet 1    Oxygen 3LPM continuous      finasteride (PROSCAR) 5 mg tablet Take 1 Tablet by mouth daily. 90 Tablet 1    furosemide (LASIX) 20 mg tablet Take 1 Tablet by mouth daily. 90 Tablet 1    tamsulosin (FLOMAX) 0.4 mg capsule Take 1 Capsule by mouth daily. 90 Capsule 1    aspirin delayed-release 81 mg tablet TAKE 1 TABLET BY MOUTH EVERY DAY 90 Tab 3    olmesartan (BENICAR) 40 mg tablet Take 40 mg by mouth daily. Indications: high blood pressure      OLANZapine (ZyPREXA) 5 mg tablet Take 5 mg by mouth nightly.  atorvastatin (LIPITOR) 40 mg tablet TAKE 1 TABLET BY MOUTH EVERY DAY 90 Tab 4    amLODIPine (NORVASC) 10 mg tablet TAKE 1 TABLET BY MOUTH DAILY 90 Tab 3    Eliquis 5 mg tablet TAKE 1 TABLET BY MOUTH TWICE DAILY (Patient not taking: Reported on 8/4/2021) 180 Tab 3    OLANZapine (ZYPREXA) 2.5 mg tablet Take 2.5 mg by mouth daily.  Take 1 tab PO at bedtime (Patient not taking: Reported on 10/20/2021)  6     No Known Allergies    Family History   Problem Relation Age of Onset    Hypertension Mother     Heart Disease Mother     Hypertension Father     Hypertension Brother      Social History     Tobacco Use    Smoking status: Former Smoker     Packs/day: 0.50     Years: 30.00     Pack years: 15.00     Types: Cigarettes    Smokeless tobacco: Never Used   Substance Use Topics    Alcohol use: No         Kashmir Carter MD

## 2021-10-20 NOTE — Clinical Note
Johnathon Barahona is still confused about the status of his request for alternative oxygen tanks. Can you have your staff f/u please?   Thanks

## 2021-10-20 NOTE — PROGRESS NOTES
Reviewed record in preparation for visit and have obtained necessary documentation. Landon Nunn is a 80 y.o.  male presents today for office visit for   Chief Complaint   Patient presents with    Coronary Artery Disease    Hypertension   . Pt is not fasting. Patient is accompanied by sister. I have received verbal consent from Landon Nunn to discuss any/all medical information while they are present in the room. Pt is in Room # 1170 [facility],[address] Floor preferred language for health care discussion is english/other. Is the patient using any DME equipment during OV? NO    Advance Directive:  1. Do you have an advance directive in place? Patient Reply: NO    2. If not, would you like material regarding how to put one in place? NO    Coordination of Care:  1. Have you been to the ER, urgent care clinic since your last visit? Hospitalized since your last visit? NO    2. Have you seen or consulted any other health care providers outside of the [address] [place] [address] since your last visit? Include any pap smears or colon screening. NO    Upcoming Appts  No    VORB: No orders of the defined types were placed in this encounter.  Anola Jayme Marcano MD/Merly Silva LPN    Landon Nunn is due for:   Health Maintenance Due   Topic    Eye Exam Retinal or Dilated     Shingrix Vaccine Age 50> (1 of 2)    DTaP/Tdap/Td series (2 - Td or Tdap)    Foot Exam Q1     MICROALBUMIN Q1     Medicare Yearly Exam      Health Maintenance reviewed and discussed per provider  Please order/place referral if appropriate.     Requested Prescriptions      No prescriptions requested or ordered in this encounter       Learning Assessment:  Learning Assessment 6/12/2019 1/25/2018   PRIMARY LEARNER Patient Patient   HIGHEST LEVEL OF EDUCATION - PRIMARY LEARNER  - DID NOT GRADUATE HIGH SCHOOL   BARRIERS PRIMARY LEARNER - NONE   CO-LEARNER CAREGIVER - No   PRIMARY LANGUAGE ENGLISH ENGLISH   LEARNER PREFERENCE PRIMARY DEMONSTRATION VIDEOS   ANSWERED BY pt patient   RELATIONSHIP SELF SELF     Depression Screening:  3 most recent [facility] [place] Screens 7/20/2021 2/10/2021 11/13/2020 10/12/2020 10/5/2020 7/19/2018 5/10/2018   Little interest or pleasure in doing things Not at all Not at all Not at all Not at all Not at all Not at all Not at all   Feeling down, depressed, irritable, or hopeless Not at all Not at all Not at all Not at all Not at all Not at all Not at all   Total Score PHQ 2 0 0 0 0 0 0 0     Abuse Screening:  Abuse Screening Questionnaire 5/10/2018 4/5/2018 3/8/2018   Do you ever feel afraid of your partner? N N N   Are you in a relationship with someone who physically or mentally threatens you? N N N   Is it safe for you to go home? Parris Foley     Fall Risk  Fall Risk Assessment, last 12 mths 7/20/2021 11/13/2020 10/5/2020 6/12/2019 7/19/2018 5/10/2018 4/5/2018   Able to walk? Yes Yes Yes Yes Yes Yes Yes   Fall in past 12 months?  0 No No No No No No   Number of falls in past 12 months - - - - - - -   Fall with injury? - - - - - - -     Recent Travel Screening and Travel History documentation     Travel Screening      No screening recorded since 10/19/21 0000      Travel History   Travel since 09/20/21     No documented travel since 09/20/21

## 2021-10-21 ENCOUNTER — PATIENT OUTREACH (OUTPATIENT)
Dept: CASE MANAGEMENT | Age: 81
End: 2021-10-21

## 2021-10-21 NOTE — PROGRESS NOTES
Complex Case Management      Date/Time:  10/21/2021 1:03 PM     Attempted to reach patient by telephone. Left HIPPA compliant message requesting a return call. Will attempt to reach patient at a later time.

## 2021-10-22 ENCOUNTER — TELEPHONE (OUTPATIENT)
Dept: PULMONOLOGY | Age: 81
End: 2021-10-22

## 2021-10-22 DIAGNOSIS — I27.20 PULMONARY HYPERTENSION (HCC): ICD-10-CM

## 2021-10-22 DIAGNOSIS — I50.30 HEART FAILURE WITH PRESERVED EJECTION FRACTION, UNSPECIFIED HF CHRONICITY (HCC): ICD-10-CM

## 2021-10-22 DIAGNOSIS — Z99.81 HYPOXEMIA REQUIRING SUPPLEMENTAL OXYGEN: ICD-10-CM

## 2021-10-22 DIAGNOSIS — R91.1 LUNG NODULE: Primary | ICD-10-CM

## 2021-10-22 DIAGNOSIS — R09.02 HYPOXEMIA REQUIRING SUPPLEMENTAL OXYGEN: ICD-10-CM

## 2021-10-22 NOTE — TELEPHONE ENCOUNTER
Pt called our office today very upset stating that we are supposed to be setting up a test for him. Pt does not know what test it is. He thinks it is for oxygen. He is very upset stating that he called our office three days ago and has been waiting for a call back.  Can someone please return his call today [phone]

## 2021-10-25 NOTE — TELEPHONE ENCOUNTER
Order placed for Oxygen conserving device, per Verbal Order from Dr Jillian Burciaga on 10/25/2021. Last office visit: 9/15/2021  Follow up Visit: Due December 2021    Provider is aware of last office visit and follow up. No further action requested from provider.

## 2021-10-27 ENCOUNTER — TELEPHONE (OUTPATIENT)
Dept: PULMONOLOGY | Age: 81
End: 2021-10-27

## 2021-10-27 DIAGNOSIS — R09.02 HYPOXEMIA REQUIRING SUPPLEMENTAL OXYGEN: ICD-10-CM

## 2021-10-27 DIAGNOSIS — R91.1 LUNG NODULE: Primary | ICD-10-CM

## 2021-10-27 DIAGNOSIS — I50.30 HEART FAILURE WITH PRESERVED EJECTION FRACTION, UNSPECIFIED HF CHRONICITY (HCC): ICD-10-CM

## 2021-10-27 DIAGNOSIS — Z99.81 HYPOXEMIA REQUIRING SUPPLEMENTAL OXYGEN: ICD-10-CM

## 2021-10-27 DIAGNOSIS — I27.20 PULMONARY HYPERTENSION (HCC): ICD-10-CM

## 2021-10-27 NOTE — TELEPHONE ENCOUNTER
Alexandro Paul from [facility] called our office because they received an order from our office for conservative device testing. She stated that they do not do this testing anymore.

## 2021-10-27 NOTE — PROGRESS NOTES
Order placed for covid test, per Verbal Order from Dr. Alcantara Fails on 10/27/2021. Last office visit: 9/15/2021  Follow up Visit: 11/11/2021    Provider is aware of last office visit and follow up. No further action requested from provider.

## 2021-10-28 ENCOUNTER — PATIENT OUTREACH (OUTPATIENT)
Dept: CASE MANAGEMENT | Age: 81
End: 2021-10-28

## 2021-10-28 NOTE — PROGRESS NOTES
Complex Case Management       Date/Time:  10/28/2021 11:03 AM     Attempted to reach patient by telephone. Left HIPPA compliant message requesting a return call. Will attempt to reach patient at a later time.

## 2021-11-01 ENCOUNTER — TELEPHONE (OUTPATIENT)
Dept: PULMONOLOGY | Age: 81
End: 2021-11-01

## 2021-11-01 NOTE — TELEPHONE ENCOUNTER
Rafal Kirby from [employer] called because they received part of an order for a home 6 min walk test. She said that she only received 6 of 19 pages. Which did not include the order or demographics.  Please resend to [phone] Refer To    PHYSICAL THERAPY REFER TO:    [facility]-  Location #1: [address] [facility],[facility] [place] [address] [place], [place] Ph:[phone]   Location # 2: [address] [facility] [address],[place], [place] Ph:[phone]   Location # 3: [address] [facility] [address] [place], [place] Ph:[phone]  Location # 4: [address] Ph:[phone]   Reason for Referral: Low back pain; Musculoskeletal neck pain, Please eval and manage, thank you.     Signatures   Electronically signed by : MAR OCHOA MD; Oct 11 2018  4:21PM CST

## 2021-11-11 ENCOUNTER — OFFICE VISIT (OUTPATIENT)
Dept: PULMONOLOGY | Age: 81
End: 2021-11-11

## 2021-11-11 VITALS
SYSTOLIC BLOOD PRESSURE: 142 MMHG | OXYGEN SATURATION: 95 % | WEIGHT: 153 LBS | RESPIRATION RATE: 17 BRPM | HEART RATE: 62 BPM | BODY MASS INDEX: 28.16 KG/M2 | TEMPERATURE: 98.1 F | HEIGHT: 62 IN | DIASTOLIC BLOOD PRESSURE: 74 MMHG

## 2021-11-11 DIAGNOSIS — J44.9 COPD, MILD (HCC): Primary | ICD-10-CM

## 2021-11-11 DIAGNOSIS — I27.20 PULMONARY HYPERTENSION (HCC): ICD-10-CM

## 2021-11-11 DIAGNOSIS — R91.1 LUNG NODULE: ICD-10-CM

## 2021-11-11 PROBLEM — J96.11 CHRONIC HYPOXEMIC RESPIRATORY FAILURE (HCC): Status: ACTIVE | Noted: 2021-11-11

## 2021-11-11 PROBLEM — I27.22 PULMONARY HYPERTENSION DUE TO LEFT HEART DISEASE (HCC): Status: ACTIVE | Noted: 2021-11-11

## 2021-11-11 PROBLEM — R07.9 CHEST PAIN: Status: ACTIVE | Noted: 2021-04-27

## 2021-11-11 PROCEDURE — G8510 SCR DEP NEG, NO PLAN REQD: HCPCS | Performed by: INTERNAL MEDICINE

## 2021-11-11 PROCEDURE — G8754 DIAS BP LESS 90: HCPCS | Performed by: INTERNAL MEDICINE

## 2021-11-11 PROCEDURE — 99214 OFFICE O/P EST MOD 30 MIN: CPT | Performed by: INTERNAL MEDICINE

## 2021-11-11 PROCEDURE — G8536 NO DOC ELDER MAL SCRN: HCPCS | Performed by: INTERNAL MEDICINE

## 2021-11-11 PROCEDURE — G8417 CALC BMI ABV UP PARAM F/U: HCPCS | Performed by: INTERNAL MEDICINE

## 2021-11-11 PROCEDURE — 94060 EVALUATION OF WHEEZING: CPT | Performed by: INTERNAL MEDICINE

## 2021-11-11 PROCEDURE — G8427 DOCREV CUR MEDS BY ELIG CLIN: HCPCS | Performed by: INTERNAL MEDICINE

## 2021-11-11 PROCEDURE — 1101F PT FALLS ASSESS-DOCD LE1/YR: CPT | Performed by: INTERNAL MEDICINE

## 2021-11-11 PROCEDURE — 94729 DIFFUSING CAPACITY: CPT | Performed by: INTERNAL MEDICINE

## 2021-11-11 PROCEDURE — G8753 SYS BP > OR = 140: HCPCS | Performed by: INTERNAL MEDICINE

## 2021-11-11 PROCEDURE — 94727 GAS DIL/WSHOT DETER LNG VOL: CPT | Performed by: INTERNAL MEDICINE

## 2021-11-11 RX ORDER — UMECLIDINIUM BROMIDE AND VILANTEROL TRIFENATATE 62.5; 25 UG/1; UG/1
1 POWDER RESPIRATORY (INHALATION) DAILY
Qty: 60 EACH | Refills: 5 | Status: SHIPPED | OUTPATIENT
Start: 2021-11-11 | End: 2022-03-24 | Stop reason: SDUPTHER

## 2021-11-11 RX ORDER — UMECLIDINIUM BROMIDE AND VILANTEROL TRIFENATATE 62.5; 25 UG/1; UG/1
1 POWDER RESPIRATORY (INHALATION) DAILY
Qty: 60 EACH | Refills: 0 | Status: SHIPPED | COMMUNITY
Start: 2021-11-11 | End: 2022-02-16 | Stop reason: SDUPTHER

## 2021-11-11 RX ORDER — ALBUTEROL SULFATE 90 UG/1
2 AEROSOL, METERED RESPIRATORY (INHALATION)
Qty: 18 G | Refills: 5 | Status: SHIPPED | OUTPATIENT
Start: 2021-11-11 | End: 2022-05-20

## 2021-11-11 NOTE — PROGRESS NOTES
Kayley Abraham presents today for   Chief Complaint   Patient presents with    Lung Nodule    Follow-up       Is someone accompanying this pt? Yes, Sisier    Is the patient using any DME equipment during OV? Yes, O2    -DME [employer]    Depression Screening:  3 most recent PHQ Screens 11/11/2021   Little interest or pleasure in doing things Not at all   Feeling down, depressed, irritable, or hopeless Not at all   Total Score PHQ 2 0       Learning Assessment:  Learning Assessment 6/12/2019   PRIMARY LEARNER Patient   HIGHEST LEVEL OF EDUCATION - PRIMARY LEARNER  -   BARRIERS PRIMARY LEARNER -   [address]  CAREGIVER -   PRIMARY LANGUAGE ENGLISH   LEARNER PREFERENCE PRIMARY DEMONSTRATION   ANSWERED BY pt   RELATIONSHIP SELF       Abuse Screening:  Abuse Screening Questionnaire 5/10/2018   Do you ever feel afraid of your partner? N   Are you in a relationship with someone who physically or mentally threatens you? N   Is it safe for you to go home? Y       Fall Risk  Fall Risk Assessment, last 12 mths 11/11/2021   Able to walk? Yes   Fall in past 12 months? 0   Do you feel unsteady? 0   Are you worried about falling 0   Number of falls in past 12 months -   Fall with injury? -         Coordination of Care:  1. Have you been to the ER, urgent care clinic since your last visit? Hospitalized since your last visit? Yes; Name: [facility]    2. Have you seen or consulted any other health care providers outside of the [address] [place] [address] since your last visit? Include any pap smears or colon screening. yes    Patient 2 Covid-19 vaccines. Patient received flu vaccine at [facility] on [address] in 09/2021.

## 2021-11-11 NOTE — PROGRESS NOTES
HISTORY OF PRESENT ILLNESS  Tyler Lara is a 80 y.o. male followin chronic hypoxemia. Pt is a former smoker with chronic shortness of breath with mild exertion. History is obtained mainly from pt's sister and outside records review. Dyspnea progressed and now occurs after walking 1 block. This improved somewhat on supplemental O2 after a hospital admission. He is able to ambulate better but finds the portable O2 tanks cumbersome. Pt denies orthopnea or PND. He has waxing and waning pedal edema. Pt has a history of cardiomyopathy with slightly reduced EF. Echo also shows pulmonary hypertension, PASP 65 mm Hg. Review of historical Echos also show a positive bubble study in 2016. CT scan in 2018 revealed a new 2 x 1.6 cm pulmonary nodule. Follow up CT ordered after his initial visit shows a 1.8 cm nodule vs R perihilar LN. PFT's showed mild obstruction with restriction and reduced DLCO, see below. Review of Systems   Constitutional: Positive for malaise/fatigue. Negative for chills, diaphoresis, fever and weight loss. HENT: Negative for congestion, ear discharge, ear pain, hearing loss, nosebleeds, sinus pain, sore throat and tinnitus. Eyes: Negative for blurred vision, double vision, photophobia, pain, discharge and redness. Respiratory: Positive for shortness of breath. Negative for cough, hemoptysis, sputum production and stridor. Wheezing: occasional.    Cardiovascular: Negative for chest pain, palpitations, orthopnea, claudication, leg swelling and PND. Gastrointestinal: Negative for abdominal pain, blood in stool, constipation, diarrhea, heartburn, melena, nausea and vomiting. Genitourinary: Negative for dysuria, flank pain, frequency, hematuria and urgency. Musculoskeletal: Positive for back pain and neck pain. Negative for falls, joint pain and myalgias. Skin: Negative for itching and rash.    Neurological: Negative for dizziness, tingling, tremors, sensory change, speech change, focal weakness, seizures, loss of consciousness, weakness and headaches. Endo/Heme/Allergies: Negative for environmental allergies and polydipsia. Does not bruise/bleed easily. Psychiatric/Behavioral: Positive for depression. Negative for hallucinations, memory loss, substance abuse and suicidal ideas. The patient is not nervous/anxious and does not have insomnia. 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 Faecalis    Enlarged prostate     GERD (gastroesophageal reflux disease)     Gout     Heart failure (HCC)     Hematuria     Hyperlipidemia     Hypertension     Incomplete emptying of bladder     Lacunar infarction (HCC)     left basal ganglia, right cerebellar    Other ill-defined conditions(799.89)     gout    PAF (paroxysmal atrial fibrillation) (HCC)     Pulmonary hypertension (HCC)     Rectal polyp     Right renal mass     Schizophrenia ([facility]     Stopped smoking with greater than 10 pack year history     Vitamin D deficiency     Weight loss, unintentional      Past Surgical History:   Procedure Laterality Date    HX COLONOSCOPY  04/17/2013    Dr. Thanh Montez    HX ENDOSCOPY      HX ORTHOPAEDIC  70[address]    wound repair L. arm    HX TURP  03/01/2017     TRANSURETHRAL RESECTION OF PROSTATE;  Surgeon: Griselda Poncho, MD; [facility]     Current Outpatient Medications on File Prior to Visit   Medication Sig Dispense Refill    amiodarone (CORDARONE) 200 mg tablet Take 0.5 Tablets by mouth daily.  Indications: prevention of recurrent atrial fibrillation 15 Tablet 0    carvediloL (COREG) 12.5 mg tablet TAKE 1 TABLET BY MOUTH TWICE A DAY 180 Tablet 3    isosorbide mononitrate ER (IMDUR) 30 mg tablet TAKE 1 TABLET BY MOUTH EVERY DAY. PLEASE SCHEDULE FOLLOW UP WITH DOCTOR 90 Tablet 0    potassium chloride SR (KLOR-CON 10) 10 mEq tablet TAKE 1 TABLET BY MOUTH EVERY DAY 90 Tablet 1    magnesium oxide (MAG-OX) 400 mg tablet TAKE 1 TABLET BY MOUTH EVERY DAY 90 Tablet 1    Oxygen 3LPM continuous      finasteride (PROSCAR) 5 mg tablet Take 1 Tablet by mouth daily. 90 Tablet 1    furosemide (LASIX) 20 mg tablet Take 1 Tablet by mouth daily. 90 Tablet 1    tamsulosin (FLOMAX) 0.4 mg capsule Take 1 Capsule by mouth daily. 90 Capsule 1    aspirin delayed-release 81 mg tablet TAKE 1 TABLET BY MOUTH EVERY DAY 90 Tab 3    olmesartan (BENICAR) 40 mg tablet Take 40 mg by mouth daily. Indications: high blood pressure      OLANZapine (ZyPREXA) 5 mg tablet Take 5 mg by mouth nightly.  atorvastatin (LIPITOR) 40 mg tablet TAKE 1 TABLET BY MOUTH EVERY DAY 90 Tab 4    amLODIPine (NORVASC) 10 mg tablet TAKE 1 TABLET BY MOUTH DAILY 90 Tab 3    OLANZapine (ZYPREXA) 2.5 mg tablet Take 2.5 mg by mouth daily. Take 1 tab PO at bedtime  6    Eliquis 5 mg tablet TAKE 1 TABLET BY MOUTH TWICE DAILY (Patient not taking: Reported on 8/4/2021) 180 Tab 3     No current facility-administered medications on file prior to visit.      No Known Allergies  Family History   Problem Relation Age of Onset    Hypertension Mother     Heart Disease Mother     Hypertension Father     Hypertension Brother      Social History     Socioeconomic History    Marital status: SINGLE     Spouse name: Not on file    Number of children: Not on file    Years of education: Not on file    Highest education level: Not on file   Occupational History    Occupation: plumbing   Tobacco Use    Smoking status: Former Smoker     Packs/day: 0.50     Years: 30.00     Pack years: 15.00     Types: Cigarettes    Smokeless tobacco: Never Used   Vaping Use    Vaping Use: Never used   Substance and Sexual Activity    Alcohol use: No    Drug use: No    Sexual activity: Never   Other Topics Concern    Not on file   Social History Narrative    Not on file     Social Determinants of Health     Financial Resource Strain:     Difficulty of Paying Living Expenses: Not on file   Food Insecurity:     Worried About Running Out of Food in the Last Year: Not on file    Jinny of Food in the Last Year: Not on file   Transportation Needs:     Lack of Transportation (Medical): Not on file    Lack of Transportation (Non-Medical): Not on file   Physical Activity:     Days of Exercise per Week: Not on file    Minutes of Exercise per Session: Not on file   Stress:     Feeling of Stress : Not on file   Social Connections:     Frequency of Communication with Friends and Family: Not on file    Frequency of Social Gatherings with Friends and Family: Not on file    Attends [employer] Services: Not on file    Active Member of [address] [place] [address] or Organizations: Not on file    Attends Club or Organization Meetings: Not on file    Marital Status: Not on file   Intimate Partner Violence:     Fear of Current or Ex-Partner: Not on file    Emotionally Abused: Not on file    Physically Abused: Not on file    Sexually Abused: Not on file   Housing Stability:     Unable to Pay for Housing in the Last Year: Not on file    Number of [place] in the Last Year: Not on file    Unstable Housing in the Last Year: Not on file     Blood pressure (!) 142/74, pulse 62, temperature 98.1 °F (36.7 °C), temperature source Oral, resp. rate 17, height 5' 2\" (1.575 m), weight 69.4 kg (153 lb), SpO2 95 %. Physical Exam  Constitutional:       General: He is not in acute distress. Appearance: He is not ill-appearing, toxic-appearing or diaphoretic. HENT:      Head: Normocephalic and atraumatic.       Right Ear: External ear normal.      Left Ear: External ear normal.      Nose:      Comments: Deferred      Mouth/Throat:      Comments: Deferred   Eyes:      General:         Right eye: No discharge. Left eye: No discharge. Extraocular Movements: Extraocular movements intact. Conjunctiva/sclera: Conjunctivae normal.      Pupils: Pupils are equal, round, and reactive to light. Neck:      Vascular: No carotid bruit. Cardiovascular:      Rate and Rhythm: Normal rate and regular rhythm. Heart sounds: Murmur (harsh holosystolic clarissa LSB, lower pitched systolic at R base unchanged) heard. Pulmonary:      Effort: Pulmonary effort is normal. No respiratory distress. Breath sounds: Normal breath sounds. No stridor. No wheezing or rhonchi. Rales: fleeting R base. Chest:      Chest wall: No tenderness. Abdominal:      General: Abdomen is flat. Palpations: Abdomen is soft. There is no mass. Tenderness: There is no abdominal tenderness. Musculoskeletal:         General: No swelling, tenderness, deformity or signs of injury. Cervical back: No rigidity or tenderness. Right lower leg: Right lower leg edema: 1+      Left lower leg: Left lower leg edema: 2+   Lymphadenopathy:      Cervical: No cervical adenopathy. Skin:     General: Skin is warm and dry. Coloration: Skin is not jaundiced or pale. Findings: No bruising, erythema, lesion or rash. Neurological:      General: No focal deficit present. Mental Status: He is alert and oriented to person, place, and time. Coordination: Coordination normal.      Gait: Abnormal gait: antalgic. Psychiatric:         Mood and Affect: Mood normal.      Comments: Slow speech and reaction time       Echo Cardiogram Complete    Component 4 mo ago   EF Echo  50       Narrative    CONCLUSIONS     * Left ventricular chamber volume is normal with moderate concentric   hypertrophy.     * Left ventricular systolic function is mildly reduced with an ejection   fraction of 50 % by Fay's.     * The anterior wall is hypokinetic.  Abnormal paradoxical septal motion   consistent with right ventricular volume overload.     * Flattening of the septum in diastole and systole consistent with right   ventricular volume and pressure overload.     * Right ventricular cavity size is severely dilated with severely reduced   systolic function. FAC 22%.     * Right atrial chamber volume is severely enlarged (69 ml/m2). Prominent   chiari network.     * There is mild eccentric mitral valve regurgitation.     * There is moderate tricuspid valve regurgitation.     * There is moderate pulmonic regurgitation.     * Across PV  peak velocity of 3.38 m/s, peak  gradient of 46 mmHg; mean   gradient of 18 mmHg and PVA of 1.72 cm2. Increased gradients could be partly   from increase strike volume from PV regurgitation.     * Moderate pulmonary hypertension, estimated pulmonary arterial systolic   pressure is 64 mmHg.     * There is trivial pericardial effusion. Comparison     * Previous echo from 18 indicated a postive bubble study, EF of 50%,   severe RV dilatation with moderately reduced function, pulmonic stenosis with   a peak velocity of 3.2 m/; MG of 23 mmHg, moderate PI/TR, and PAP of 65 mmHg. Patient Info   Name:     Britni Carroll   Age:     80 years   :     1940   Gender:     Male   MRN:     [id number]   Ht:     62 in   Wt:     150 lb   BSA:     1.74 m2   HR:     66 bpm   BP:     167   /     89 mmHg   Exam Date:     2021 8:59 AM   Site Location:     [facility]   Exam Location:     [facility]   Patient Status:     Inpatient     Exam Type:     ECHO CARDIOGRAM COMPLETE     Indications        - shortness of breath       Left Ventricle     Left ventricular chamber volume is normal with moderate concentric   hypertrophy.     Left ventricular systolic function is mildly reduced with an ejection   fraction of 50 % by Fay's.     The anterior wall is hypokinetic. Abnormal paradoxical septal motion   consistent with right ventricular volume overload.   Flattening of the septum in diastole and systole consistent with right   ventricular volume and pressure overload.     Left ventricular diastolic function: indeterminate. Right Ventricle     Right ventricular cavity size is severely dilated with severely reduced   systolic function. FAC 22%. Left Atrium     Left atrial chamber is mildly enlarged with a left atrial volume index of   38.00 ml/m^2 by BP MOD. Right Atrium     Right atrial chamber volume is severely enlarged (69 ml/m2). Prominent   chiari network.     Right atrial chamber volume is severely enlarged. Atrial Septum     Previously positive bubble study (2/4/18). Aortic Valve     The aortic valve is tricuspid.     There is no aortic valve stenosis.     There is trace aortic valve regurgitation. Pulmonic Valve     The pulmonic valve is mildly thickened with what appears normal excursion.     Across PV  peak velocity of 3.38 m/s, peak  gradient of 46 mmHg; mean   gradient of 18 mmHg and PVA of 1.72 cm2. Increased gradients could be partly   from increase strike volume from PV regurgitation.     There is moderate pulmonic regurgitation. Mitral Valve     The mitral valve has normal leaflets.     There is no mitral valve stenosis.     There is mild eccentric mitral valve regurgitation. Tricuspid Valve     The tricuspid valve leaflets are normal and thickened. There is leaflet   prolapse seen.     There is no tricuspid valve stenosis.     There is moderate tricuspid valve regurgitation.     Moderate pulmonary hypertension, estimated pulmonary arterial systolic   pressure is 64 mmHg. Pericardium/Pleural     There is trivial pericardial effusion. Inferior Vena Cava     Dilated inferior vena cava with >50% collapse upon inspiration consistent   with elevated right atrial pressure, 8 mmHg.      Aorta     The prox ascending aorta is mildly dilated measuring 3.6 cm with an index of   2.06 cm/m2.     The aortic root at the sinus of Valsalva is normal measuring 3.2 cm with an   index of 1.83 cm/m2.     The aortic measurements are indexed to age and body surface area.        Pulmonic Valve   ----------------------------------------------------------------------   Name                                 Value        Normal   ----------------------------------------------------------------------     PV 2D   ----------------------------------------------------------------------   RVOT Diameter (2D)                 2.70 cm     1.70-2.70     RVOT Doppler   ----------------------------------------------------------------------   RVOT Peak Velocity             111.00 cm/s                 RVOT Mean Gradient                  2 mmHg                 RVOT Area                         5.73 cm2                   PV Doppler   ----------------------------------------------------------------------   PV Peak Velocity               337.84 cm/s                 PV Peak Gradient                   46 mmHg                 PV Mean Gradient                   18 mmHg                 PV Area (Cont Eq VTI)             1.72 cm2                 PV Area Index (Cont Eq VTI)    0.98 cm2/m2                 PV Area (Cont Eq Darnell)             1.88 cm2                 PV Area Index (Cont Eq Darnell)    1.08 cm2/m2     Mitral Valve   ----------------------------------------------------------------------   Name                                 Value        Normal   ----------------------------------------------------------------------     MV Doppler   ----------------------------------------------------------------------   MV Decel Henry                   174 cm/s2                 MV PHT                               98 ms                 MV Area (PHT)                     2.24 cm2     0.94-2.98     MV Diastolic Function   ----------------------------------------------------------------------   MV E Peak Velocity              58.70 cm/s       <=50.00   MV A Peak Velocity             119.00 cm/s                 MV E/A                                0.49        <=1.60   MV Decel Time                       338 ms                   MV Annular TDI   ----------------------------------------------------------------------   MV Lateral e' Velocity           6.96 cm/s       >=10.00     Tricuspid Valve   ----------------------------------------------------------------------   Name                                 Value        Normal   ----------------------------------------------------------------------     TV Regurgitation Doppler   ----------------------------------------------------------------------   TR Peak Velocity               373.00 cm/s      <=280.00     Estimated PAP/RSVP   ----------------------------------------------------------------------   RA Pressure                         8 mmHg            <8   PA Systolic Pressure               64 mmHg           <44   RV Systolic Pressure               64 mmHg           <36     Aorta   ----------------------------------------------------------------------   Name                                 Value        Normal   ----------------------------------------------------------------------     Ascending Aorta   ----------------------------------------------------------------------   Sinus of Valsalva Diameter          3.2 cm         <=3.7   Sinus of Valsalva Index         1.83 cm/m2        <=1.90   Prox Asc Ao Diameter                3.6 cm         <=3.4   Prox Asc Ao Diameter Index      2.06 cm/m2        <=1.70     Venous   ----------------------------------------------------------------------   Name                                 Value        Normal   ----------------------------------------------------------------------     IVC/SVC   ----------------------------------------------------------------------   IVC Diameter (Exp 2D)              2.30 cm        <=2.10   IVC Diameter Percent Change   (2D)                                  43 %          >=50 Ventricles   ----------------------------------------------------------------------   Name                                 Value        Normal   ----------------------------------------------------------------------     LV Dimensions 2D/MM   ----------------------------------------------------------------------   IVS Diastolic Thickness (2D)       1.50 cm        <=1.04   LVID Diastole (2D)                 4.70 cm     2.56-7.42   LVPW Diastolic Thickness   (2D)                               1.50 cm        <=1.04   LVID Systole (2D)                  3.20 cm     1.90-2.89   LVID Diastolic Index (2D)       2.69 cm/m2     2.20-3.00   LV Mass (2D Cubed)                294.05 g  88..00   LV Mass Index (2D Cubed)        0.02 g/cm2     0.00-0.01   Relative Wall Thickness (2D)          0.64                   LV Fractional Shortening/Ejection Fraction 2D/MM   ----------------------------------------------------------------------   LV Diastolic Length (4C)           6.90 cm                 LV Systolic Length (4C)            6.05 cm                 LV Stroke Volume (4C MOD)         39.40 ml                   RV Dimensions 2D/MM   ----------------------------------------------------------------------   RV Basal Diastolic Dimension       0.79 cm     7.46-5.89   RV Diastolic Area (4C)           43.70 cm2   50.25-90.40   RV Systolic Area (4C)            34.10 cm2    3.00-15.00   TAPSE                               2.2 cm         >=1.7     RV Fractional Shortening 2D   ----------------------------------------------------------------------   RV FAC (4C)                           22 %          >=35     Atria   ----------------------------------------------------------------------   Name                                 Value        Normal   ----------------------------------------------------------------------     LA Dimensions   ----------------------------------------------------------------------   LA Dimension (2D)                  5.60 cm     3.00-4.10   LA Dimen Index (2D)             3.21 cm/m2                 LA Volume Index (BP A-L)       37.84 ml/m2        <35.00   LA Volume Index (BP MOD)       38.00 ml/m2   16.00-34.00     RA Dimensions   ----------------------------------------------------------------------   RA Diastolic Major Axis   Length (4C)                        7.19 cm                 RA Diastolic Major Axis   Length Index (4C)                     0.41                 RA Area (4C)                     33.00 cm2        <21.00   RA ESV (4C MOD)                  116.00 ml   18.00-32.00   RA ESV Index (4C MOD)          66.50 ml/m2       <=32.00       Report Signatures   Finalized by Shelli Estrada MD on 4/30/2021 12:00 PM       Technical Quality:     Fair     Staff   Ordering Provider:     Zak Ledezma A     Complete two-dimensional, color flow and Doppler transthoracic echocardiogram   is performed. [id number]     Sonographer:     Elroy AN  Other Result Text    This result has an attachment that is not available. Interface, Minnie Muse - 04/30/2021 12:00 PM EDT   Formatting of this note might be different from the original.   CONCLUSIONS     * Left ventricular chamber volume is normal with moderate concentric   hypertrophy.     * Left ventricular systolic function is mildly reduced with an ejection   fraction of 50 % by Fay's.     * The anterior wall is hypokinetic. Abnormal paradoxical septal motion   consistent with right ventricular volume overload.     * Flattening of the septum in diastole and systole consistent with right   ventricular volume and pressure overload.     * Right ventricular cavity size is severely dilated with severely reduced   systolic function. FAC 22%.     * Right atrial chamber volume is severely enlarged (69 ml/m2).  Prominent   chiari network.     * There is mild eccentric mitral valve regurgitation.     * There is moderate tricuspid valve regurgitation.     * There is moderate pulmonic regurgitation.     * Across PV  peak velocity of 3.38 m/s, peak  gradient of 46 mmHg; mean   gradient of 18 mmHg and PVA of 1.72 cm2. Increased gradients could be partly   from increase strike volume from PV regurgitation.     * Moderate pulmonary hypertension, estimated pulmonary arterial systolic   pressure is 64 mmHg.     * There is trivial pericardial effusion. Comparison     * Previous echo from 18 indicated a postive bubble study, EF of 50%,   severe RV dilatation with moderately reduced function, pulmonic stenosis with   a peak velocity of 3.2 m/; MG of 23 mmHg, moderate PI/TR, and PAP of 65 mmHg. Patient Info   Name:     Elsy Angel   Age:     80 years   :     1940   Gender:     Male   MRN:     [id number]   Ht:     62 in   Wt:     150 lb   BSA:     1.74 m2   HR:     66 bpm   BP:     167   /     89 mmHg   Exam Date:     2021 8:59 AM   Site Location:     [facility]   Exam Location:     [facility]   Patient Status:     Inpatient     Exam Type:     ECHO CARDIOGRAM COMPLETE     Indications        - shortness of breath       Left Ventricle     Left ventricular chamber volume is normal with moderate concentric   hypertrophy.     Left ventricular systolic function is mildly reduced with an ejection   fraction of 50 % by Fay's.     The anterior wall is hypokinetic. Abnormal paradoxical septal motion   consistent with right ventricular volume overload.     Flattening of the septum in diastole and systole consistent with right   ventricular volume and pressure overload.     Left ventricular diastolic function: indeterminate. Right Ventricle     Right ventricular cavity size is severely dilated with severely reduced   systolic function. FAC 22%. Left Atrium     Left atrial chamber is mildly enlarged with a left atrial volume index of   38.00 ml/m^2 by BP MOD. Right Atrium     Right atrial chamber volume is severely enlarged (69 ml/m2).  Prominent   chiari network.     Right atrial chamber volume is severely enlarged. Atrial Septum     Previously positive bubble study (2/4/18). Aortic Valve     The aortic valve is tricuspid.     There is no aortic valve stenosis.     There is trace aortic valve regurgitation. Pulmonic Valve     The pulmonic valve is mildly thickened with what appears normal excursion.     Across PV  peak velocity of 3.38 m/s, peak  gradient of 46 mmHg; mean   gradient of 18 mmHg and PVA of 1.72 cm2. Increased gradients could be partly   from increase strike volume from PV regurgitation.     There is moderate pulmonic regurgitation. Mitral Valve     The mitral valve has normal leaflets.     There is no mitral valve stenosis.     There is mild eccentric mitral valve regurgitation. Tricuspid Valve     The tricuspid valve leaflets are normal and thickened. There is leaflet   prolapse seen.     There is no tricuspid valve stenosis.     There is moderate tricuspid valve regurgitation.     Moderate pulmonary hypertension, estimated pulmonary arterial systolic   pressure is 64 mmHg. Pericardium/Pleural     There is trivial pericardial effusion. Inferior Vena Cava     Dilated inferior vena cava with >50% collapse upon inspiration consistent   with elevated right atrial pressure, 8 mmHg. Aorta     The prox ascending aorta is mildly dilated measuring 3.6 cm with an index of   2.06 cm/m2.     The aortic root at the sinus of Valsalva is normal measuring 3.2 cm with an   index of 1.83 cm/m2.     The aortic measurements are indexed to age and body surface area.        Pulmonic Valve   ----------------------------------------------------------------------   Name                                 Value        Normal   ----------------------------------------------------------------------     PV 2D   ----------------------------------------------------------------------   RVOT Diameter (2D)                 2.70 cm     1.70-2.70     RVOT Doppler   ----------------------------------------------------------------------   RVOT Peak Velocity             111.00 cm/s                 RVOT Mean Gradient                  2 mmHg                 RVOT Area                         5.73 cm2                   PV Doppler   ----------------------------------------------------------------------   PV Peak Velocity               337.84 cm/s                 PV Peak Gradient                   46 mmHg                 PV Mean Gradient                   18 mmHg                 PV Area (Cont Eq VTI)             1.72 cm2                 PV Area Index (Cont Eq VTI)    0.98 cm2/m2                 PV Area (Cont Eq Darnell)             1.88 cm2                 PV Area Index (Cont Eq Darnell)    1.08 cm2/m2     Mitral Valve   ----------------------------------------------------------------------   Name                                 Value        Normal   ----------------------------------------------------------------------     MV Doppler   ----------------------------------------------------------------------   MV Decel Kimble                   174 cm/s2                 MV PHT                               98 ms                  MV Area (PHT)                     2.24 cm2     6.23-5.13     MV Diastolic Function   ----------------------------------------------------------------------   MV E Peak Velocity              58.70 cm/s       <=50.00   MV A Peak Velocity             119.00 cm/s                 MV E/A                                0.49        <=1.60   MV Decel Time                       338 ms                   MV Annular TDI   ----------------------------------------------------------------------   MV Lateral e' Velocity           6.96 cm/s       >=10.00     Tricuspid Valve   ----------------------------------------------------------------------   Name                                 Value        Normal   ---------------------------------------------------------------------- TV Regurgitation Doppler   ----------------------------------------------------------------------   TR Peak Velocity               373.00 cm/s      <=280.00     Estimated PAP/RSVP   ----------------------------------------------------------------------   RA Pressure                         8 mmHg            <8   PA Systolic Pressure               64 mmHg           <80   RV Systolic Pressure               64 mmHg           <36     Aorta   ----------------------------------------------------------------------   Name                                 Value        Normal   ----------------------------------------------------------------------     Ascending Aorta   ----------------------------------------------------------------------   Sinus of Valsalva Diameter          3.2 cm         <=3.7   Sinus of Valsalva Index         1.83 cm/m2        <=1.90   Prox Asc Ao Diameter                3.6 cm         <=3.4   Prox Asc Ao Diameter Index      2.06 cm/m2        <=1.70     Venous   ----------------------------------------------------------------------   Name                                 Value        Normal   ----------------------------------------------------------------------     IVC/SVC   ----------------------------------------------------------------------   IVC Diameter (Exp 2D)              2.30 cm        <=2.10   IVC Diameter Percent Change   (2D)                                  43 %          >=50     Ventricles   ----------------------------------------------------------------------   Name                                 Value        Normal   ----------------------------------------------------------------------     LV Dimensions 2D/MM   ----------------------------------------------------------------------   IVS Diastolic Thickness (2D)       1.50 cm        <=1.04   LVID Diastole (2D)                 4.70 cm     2.33-5.66   LVPW Diastolic Thickness   (2D)                               1.50 cm        <=1.04 LVID Systole (2D)                  3.20 cm     3.37-9.77   LVID Diastolic Index (2D)       2.69 cm/m2     2.20-3.00   LV Mass (2D Cubed)                294.05 g  88..00   LV Mass Index (2D Cubed)        0.02 g/cm2     0.00-0.01   Relative Wall Thickness (2D)          0.64                   LV Fractional Shortening/Ejection Fraction 2D/MM   ----------------------------------------------------------------------   LV Diastolic Length (4C)           6.90 cm                 LV Systolic Length (4C)            6.05 cm                 LV Stroke Volume (4C MOD)         39.40 ml                   RV Dimensions 2D/MM   ----------------------------------------------------------------------   RV Basal Diastolic Dimension       4.50 cm     3.69-3.53   RV Diastolic Area (4C)           43.70 cm2   33.79-64.05   RV Systolic Area (4C)            34.10 cm2    3.00-15.00   TAPSE                               2.2 cm         >=1.7     RV Fractional Shortening 2D   ----------------------------------------------------------------------   RV FAC (4C)                           22 %          >=35     Atria   ----------------------------------------------------------------------   Name                                 Value        Normal   ----------------------------------------------------------------------     LA Dimensions   ----------------------------------------------------------------------   LA Dimension (2D)                  5.60 cm     3.00-4.10   LA Dimen Index (2D)             3.21 cm/m2                 LA Volume Index (BP A-L)       37.84 ml/m2        <35.00   LA Volume Index (BP MOD)       38.00 ml/m2   16.00-34.00     RA Dimensions   ----------------------------------------------------------------------   RA Diastolic Major Axis   Length (4C)                        7.19 cm                 RA Diastolic Major Axis   Length Index (4C)                     0.41                 RA Area (4C)                     33.00 cm2        <21.00   RA ESV (4C MOD)                  116.00 ml   18.00-32.00   RA ESV Index (4C MOD)          66.50 ml/m2       <=32.00       Report Signatures   Finalized by Remington Maldonado MD on 4/30/2021 12:00 PM       Technical Quality:     Fair     Staff   Ordering Provider:     [facility]     Complete two-dimensional, color flow and Doppler transthoracic echocardiogram   is performed. [id number]     Sonographer:     Kaley Harrell [facility]     Date: 04/30/21   Received From: [address]         CT Results (most recent):  Results from [place] encounter on 10/07/21    CT CHEST WO CONT    Narrative  CT CHEST WITHOUT ENHANCEMENT    INDICATION: Pulmonary nodule. TECHNIQUE: Axial images obtained from the thoracic inlet to the level of the  diaphragm without intravenous contrast. Coronal and sagittal reformatted images  were obtained. All CT scans are performed using dose optimization techniques as  appropriate to the performed exam including the following: Automated exposure  control, adjustment of mA and/or kV according to patient size, and use of  iterative reconstructive technique. COMPARISON: None. CHEST FINDINGS:  The evaluation of the mediastinum, hilum and vascular structures is limited in  the absence of intravenous contrast.    Lungs: Minimal linear atelectasis or scar in the bilateral lung base. Rounded  nodular opacity at the right middle lobe anterior hilum measuring 15 x 18 mm on  image 32, more conspicuous than other hilar enlarged vascular structures. Assessment of the hilum is limited without IV contrast. Otherwise no lung mass. No acute findings. Pleura: No effusion. Lymph Nodes: No lymphadenopathy in the mediastinum or axilla. Cardiovascular: No aortic aneurysm. Enlarged main pulmonary artery measuring  3.5-4 cm. Global cardiomegaly with minimal pericardial effusion. Thyroid: Unremarkable in its visualized aspects.     Bones/soft tissues: Mild thoracic vertebral osteophytes and other degenerative  changes. Upper Abdomen: 2.3 cm, 12 Hounsfield unit right upper pole renal cyst. 3.4 x 2.7  cm left adrenal mass contains macroscopic fat. Impression  1. Right middle lobe 1.8 cm perihilar lung nodule, hilar lymph node, versus  enlarged hilar vessel. Limited assessment and differentiation without IV  contrast. Enhanced CT chest exam is recommended. 2. Enlarged main pulmonary artery may indicate pulmonary arterial hypertension. 3. Moderate global cardiomegaly. 4. Left adrenal myelolipoma. 5. Right renal cyst.      PFT: mild obstruction and restriction. Reduced DLCO     ASSESSMENT and PLAN  Encounter Diagnoses   Name Primary?  COPD, mild (HCC) Yes    Lung nodule     Pulmonary hypertension (HCC)        Pt with chronic hypoxemia and pulmonary hypertension likely Grp 2/3 due to chronic heart failure and COPD. Pt awaits POC, already tested at home. Start Anoro and prn Albuterol with spacer, see orders. Continue rest of medications as listed. Sample given and pt taught inhaler technique. Schedule CT chest with contrast in April. Contrast will allow differentiation between nodule and lymphadenopathy. RTC 4-5 months.

## 2021-11-15 ENCOUNTER — OFFICE VISIT (OUTPATIENT)
Dept: CARDIOLOGY CLINIC | Age: 81
End: 2021-11-15
Payer: MEDICARE

## 2021-11-15 VITALS
SYSTOLIC BLOOD PRESSURE: 139 MMHG | DIASTOLIC BLOOD PRESSURE: 86 MMHG | WEIGHT: 155 LBS | OXYGEN SATURATION: 98 % | BODY MASS INDEX: 28.52 KG/M2 | HEIGHT: 62 IN | HEART RATE: 64 BPM

## 2021-11-15 DIAGNOSIS — I37.0 PULMONARY VALVE STENOSIS, UNSPECIFIED ETIOLOGY: Primary | ICD-10-CM

## 2021-11-15 PROCEDURE — G8427 DOCREV CUR MEDS BY ELIG CLIN: HCPCS | Performed by: INTERNAL MEDICINE

## 2021-11-15 PROCEDURE — G8752 SYS BP LESS 140: HCPCS | Performed by: INTERNAL MEDICINE

## 2021-11-15 PROCEDURE — G8417 CALC BMI ABV UP PARAM F/U: HCPCS | Performed by: INTERNAL MEDICINE

## 2021-11-15 PROCEDURE — G8510 SCR DEP NEG, NO PLAN REQD: HCPCS | Performed by: INTERNAL MEDICINE

## 2021-11-15 PROCEDURE — 1101F PT FALLS ASSESS-DOCD LE1/YR: CPT | Performed by: INTERNAL MEDICINE

## 2021-11-15 PROCEDURE — 99214 OFFICE O/P EST MOD 30 MIN: CPT | Performed by: INTERNAL MEDICINE

## 2021-11-15 PROCEDURE — G8536 NO DOC ELDER MAL SCRN: HCPCS | Performed by: INTERNAL MEDICINE

## 2021-11-15 PROCEDURE — G8754 DIAS BP LESS 90: HCPCS | Performed by: INTERNAL MEDICINE

## 2021-11-15 NOTE — PROGRESS NOTES
Ghazal Bernal presents today for   Chief Complaint   Patient presents with    Follow-up     overdue     Shortness of Breath     exertional       Ghazal Bernal preferred language for health care discussion is english/other. Is someone accompanying this pt? Wife     Is the patient using any DME equipment during OV? Oxygen   Depression Screening:  3 most recent PHQ Screens 11/15/2021   Little interest or pleasure in doing things Not at all   Feeling down, depressed, irritable, or hopeless Not at all   Total Score PHQ 2 0       Learning Assessment:  Learning Assessment 6/12/2019   PRIMARY LEARNER Patient   HIGHEST LEVEL OF EDUCATION - PRIMARY LEARNER  -   BARRIERS PRIMARY LEARNER -   CO-LEARNER CAREGIVER -   PRIMARY LANGUAGE ENGLISH   LEARNER PREFERENCE PRIMARY DEMONSTRATION   ANSWERED BY pt   RELATIONSHIP SELF       Abuse Screening:  Abuse Screening Questionnaire 5/10/2018   Do you ever feel afraid of your partner? N   Are you in a relationship with someone who physically or mentally threatens you? N   Is it safe for you to go home? Y       Fall Risk  Fall Risk Assessment, last 12 mths 11/15/2021   Able to walk? Yes   Fall in past 12 months? 0   Do you feel unsteady? 0   Are you worried about falling 0   Number of falls in past 12 months -   Fall with injury? -       Pt currently taking Anticoagulant therapy? ASA 81mg every day     Coordination of Care:  1. Have you been to the ER, urgent care clinic since your last visit? Hospitalized since your last visit? no    2. Have you seen or consulted any other health care providers outside of the [address] [place] [address] since your last visit? Include any pap smears or colon screening.  no

## 2021-11-19 DIAGNOSIS — I25.10 CHRONIC CORONARY ARTERY DISEASE: ICD-10-CM

## 2021-11-19 DIAGNOSIS — E78.5 HYPERLIPIDEMIA, UNSPECIFIED HYPERLIPIDEMIA TYPE: Chronic | ICD-10-CM

## 2021-11-19 DIAGNOSIS — I50.9 CONGESTIVE HEART FAILURE (HCC): ICD-10-CM

## 2021-11-19 NOTE — TELEPHONE ENCOUNTER
PCP: Jose Elias Kim MD    Last appt: 11/15/2021  Future Appointments   Date Time Provider Solitario Fuchs   12/27/2021  2:40 PM Hai Babcock [facility] SCHED   4/21/2022 11:00 AM Jose Elias Kim MD A BS AMB   5/16/2022 11:30 AM Carol Calderon MD [facility] BS AMB       Requested Prescriptions     Pending Prescriptions Disp Refills    amiodarone (CORDARONE) 200 mg tablet 90 Tablet 3     Sig: Take 0.5 Tablets by mouth daily.  Indications: prevention of recurrent atrial fibrillation

## 2021-11-19 NOTE — TELEPHONE ENCOUNTER
Requested Prescriptions     Pending Prescriptions Disp Refills    amiodarone (CORDARONE) 200 mg tablet       Sig: Take 0.5 Tablets by mouth daily.  Indications: prevention of recurrent atrial fibrillation     Patient out of medication

## 2021-11-21 NOTE — PROGRESS NOTES
Subjective:      Larisa Ferguson is seen today for cardiac re-evaluation. He is a 80 y.o. man with multiple medical problems including coronary artery disease, cardiomyopathy with ejection fraction of 50%, pulmonic stenosis, prior history of endocarditis of the pulmonic valve, paroxysmal atrial fibrillation, pulmonary hypertension, schizophrenia, chronic Amiodarone therapy, hyperlipidemia, diabetes and hypertension. The patient was seen in early 2019 for follow up of  elevated LFTs. The patient is on Amiodarone therapy with a starting date which was not completely clear. He did require a cardioversion  in 2016 which was likely to have been the time he was started on Amiodarone. His Amiodarone was decreased to 100 mg daily at last visit. His LFTs were rechecked and April of this year of 2019. Results are listed below. He is also on statin therapy. In the office today, he reports that he was taken off the Eliquis secondary to hematuria. He reports his breathing is \"good \". . He does \"wheeze \"a little bit. He has had no chest pain. He has had no palpitations. He was supposed to have an echocardiogram done today as it has not been done since 2/7/18.     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    Chronic obstructive pulmonary disease ([address] 11/08/2019    Enlarged prostate 11/08/2019    Fall 11/08/2019    Infective endocarditis 11/08/2019    Hypoxia 11/08/2019    Heart valve disease 11/08/2019    Gram-positive bacteremia 11/08/2019    Shortness of breath 11/08/2019    Sensory hearing loss 11/08/2019    Retention of urine 11/08/2019    Personal history of other medical treatment 11/08/2019    Palpitations 11/08/2019    Pulmonic stenosis 12/28/2018    Pulmonary hypertension ([address]  LVH (left ventricular hypertrophy) 03/17/2018    On amiodarone therapy 03/17/2018    RBBB 03/17/2018    Paroxysmal atrial fibrillation ([facility] 85/94/7591    Diastolic dysfunction 38/08/5785    Pneumonia due to infectious organism 03/08/2018    Lung nodule 03/08/2018    SIRS (systemic inflammatory response syndrome) ([facility] 02/08/2018    Elevated troponin 02/07/2018    Type 2 diabetes mellitus with chronic kidney disease ([facility] 02/03/2018    Type 2 diabetes mellitus without complication, without long-term current use of insulin ([facility] 01/25/2018    Hyperlipidemia 01/25/2018    Syncope and collapse 07/19/2017    Left adrenal mass ([address] 06/25/2017    Acute bacterial endocarditis 09/28/2016    ENID (acute kidney injury) ([facility] 09/09/2016    Chronic systolic congestive heart failure ([facility] 02/08/2016    Thalassemia 01/27/2016    Gastroesophageal reflux disease with esophagitis 01/27/2016    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    Swelling of lower extremity 01/27/2016    Hypokalemia 01/27/2016    Visual hallucinations 01/27/2016    Essential hypertension 01/27/2016    Congestive heart failure (HCC) 01/27/2016    Chronic schizophrenia ([facility] 01/27/2016    Chronic kidney disease 01/27/2016    Gout 01/27/2016    Chronic coronary artery disease 01/27/2016    Hematuria 01/27/2016    Acute on chronic diastolic heart failure ([facility] 12/30/2015    Non-adherence to medical treatment 11/26/2015    GERD (gastroesophageal reflux disease) 04/23/2011    QT prolongation 04/23/2011    Refusal of blood transfusions as patient is Confucianist 03/17/2011     Current Outpatient Medications   Medication Sig Dispense Refill    umeclidinium-vilanteroL (Anoro Ellipta) 62.5-25 mcg/actuation inhaler Take 1 Puff by inhalation daily. 60 Each 0    umeclidinium-vilanteroL (Anoro Ellipta) 62.5-25 mcg/actuation inhaler Take 1 Puff by inhalation daily.  60 Each 5    albuterol (PROVENTIL HFA, VENTOLIN HFA, PROAIR HFA) 90 mcg/actuation inhaler Take 2 Puffs by inhalation every six (6) hours as needed for Wheezing or Shortness of Breath. 18 g 5    inhalational spacing device Use with Albuterol inhaler 1 Each 3    amiodarone (CORDARONE) 200 mg tablet Take 0.5 Tablets by mouth daily. Indications: prevention of recurrent atrial fibrillation 15 Tablet 0    carvediloL (COREG) 12.5 mg tablet TAKE 1 TABLET BY MOUTH TWICE A  Tablet 3    isosorbide mononitrate ER (IMDUR) 30 mg tablet TAKE 1 TABLET BY MOUTH EVERY DAY. PLEASE SCHEDULE FOLLOW UP WITH DOCTOR 90 Tablet 0    potassium chloride SR (KLOR-CON 10) 10 mEq tablet TAKE 1 TABLET BY MOUTH EVERY DAY 90 Tablet 1    magnesium oxide (MAG-OX) 400 mg tablet TAKE 1 TABLET BY MOUTH EVERY DAY 90 Tablet 1    Oxygen 3LPM continuous      finasteride (PROSCAR) 5 mg tablet Take 1 Tablet by mouth daily. 90 Tablet 1    furosemide (LASIX) 20 mg tablet Take 1 Tablet by mouth daily. 90 Tablet 1    tamsulosin (FLOMAX) 0.4 mg capsule Take 1 Capsule by mouth daily. 90 Capsule 1    Eliquis 5 mg tablet TAKE 1 TABLET BY MOUTH TWICE DAILY 180 Tab 3    aspirin delayed-release 81 mg tablet TAKE 1 TABLET BY MOUTH EVERY DAY 90 Tab 3    olmesartan (BENICAR) 40 mg tablet Take 40 mg by mouth daily. Indications: high blood pressure      OLANZapine (ZyPREXA) 5 mg tablet Take 5 mg by mouth nightly.  atorvastatin (LIPITOR) 40 mg tablet TAKE 1 TABLET BY MOUTH EVERY DAY 90 Tab 4    amLODIPine (NORVASC) 10 mg tablet TAKE 1 TABLET BY MOUTH DAILY 90 Tab 3    OLANZapine (ZYPREXA) 2.5 mg tablet Take 2.5 mg by mouth daily.  Take 1 tab PO at bedtime  6     No Known Allergies  Past Medical History:   Diagnosis Date    Adrenal myelolipoma     Benign non-nodular prostatic hyperplasia with lower urinary tract symptoms     Beta thalassemia trait     CAD (coronary artery disease)     Cardiomyopathy (HCC)     EF 30%    Diabetes mellitus ([address]     Diabetic nephropathy ([facility]     Diverticulosis  Elevated PSA     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  Faecalis    Enlarged prostate     GERD (gastroesophageal reflux disease)     Gout     Heart failure (HCC)     Hematuria     Hyperlipidemia     Hypertension     Incomplete emptying of bladder     Lacunar infarction (HCC)     left basal ganglia, right cerebellar    Other ill-defined conditions(799.89)     gout    PAF (paroxysmal atrial fibrillation) (HCC)     Pulmonary hypertension (HCC)     Rectal polyp     Right renal mass     Schizophrenia ([address]     Stopped smoking with greater than 10 pack year history     Vitamin D deficiency     Weight loss, unintentional      Past Surgical History:   Procedure Laterality Date    HX COLONOSCOPY  04/17/2013    Dr. Mari Garcia    HX ENDOSCOPY      HX ORTHOPAEDIC  8689'G    wound repair L. arm    HX TURP  03/01/2017     TRANSURETHRAL RESECTION OF PROSTATE;  Surgeon: Anatoly Cantu MD; [facility]     Family History   Problem Relation Age of Onset    Hypertension Mother     Heart Disease Mother     Hypertension Father     Hypertension Brother      Social History     Tobacco Use   Smoking Status Former Smoker    Packs/day: 0.50    Years: 30.00    Pack years: 15.00    Types: Cigarettes   Smokeless Tobacco Never Used          Review of Systems, additional:  Constitutional: negative  Eyes: negative  Respiratory: positive for dyspnea on exertion - improving  Cardiovascular: positive for dyspnea on exertion - improving  Gastrointestinal: negative  Musculoskeletal:negative  Neurological: negative  Behvioral/Psych: negative  Endocrine: negative  ENT: negative    Objective:     Visit Vitals  /86 (BP 1 Location: Left upper arm, BP Patient Position: Sitting, BP Cuff Size: Adult)   Pulse 64   Ht 5' 2\" (1.575 m)   Wt 70.3 kg (155 lb)   SpO2 98%   BMI 28.35 kg/m²     General:  alert, cooperative, no distress   Chest Wall: inspection normal - no chest wall deformities or tenderness, respiratory effort normal   Lung: clear to auscultation bilaterally   Heart:  normal rate and regular rhythm, S1 and S2 normal, systolic murmur 2/6 at the left second intercostal space border   Abdomen: soft, non-tender. Bowel sounds normal. No masses,  no organomegaly   Extremities: 1+ pitting edema to lower extremities Skin: no rashes   Neuro: alert, oriented, normal speech, no focal findings or movement disorder noted         Assessment/Plan:         ICD-10-CM ICD-9-CM    1. LVH (left ventricular hypertrophy), moderate by recent echo 02/07/2018. Other findings including severely dilated RV and pulmonary hypertension, moderate pulmonic stenosis with last transvalvular velocity in 2016, 3.2 m/s. He was supposed to have a repeat echocardiogram for this visit. Will reorder. I51.7 429.3    2. Hyperlipidemia, unspecified hyperlipidemia type E78.5 272.4    3. Chronic coronary artery disease, nuclear stress test done 02/2016; small apical lateral scar with no significant ischemia. I25.10 414.00    4. Congestive heart failure, unspecified congestive heart failure chronicity, unspecified congestive heart failure type (HCC) RT 6 mos I50.9 428.0    5. Essential hypertension, severe elevation of systolic  BP in office today. Blood pressure check in the office in 2 weeks. Return in 6 months I10 401.9    6. Type 2 diabetes mellitus with nephropathy (HCC) E11.21 250.40      583.81    7. Chronic kidney disease, unspecified CKD stage N18.9 585.9    8. Chronic schizophrenia ([facility] F20.9 295.62    9. On amiodarone therapy, taking 100 mg daily. Will order chest x-ray, LFTs and repeat TSH. Recent TSH 7.0. Z79.899 V58.69    10. Paroxysmal atrial fibrillation ([facility] S/P cardioversion 1/4/2016, on amiodarone  I48.0 427.31    11. Pulmonic stenosis, return in 6 months with echocardiogram done at that time.

## 2021-11-22 DIAGNOSIS — Z76.0 MEDICATION REFILL: ICD-10-CM

## 2021-11-22 RX ORDER — FUROSEMIDE 20 MG/1
TABLET ORAL
Qty: 90 TABLET | Refills: 1 | Status: SHIPPED | OUTPATIENT
Start: 2021-11-22 | End: 2022-03-09 | Stop reason: SDUPTHER

## 2021-11-22 RX ORDER — AMIODARONE HYDROCHLORIDE 200 MG/1
100 TABLET ORAL DAILY
Qty: 90 TABLET | Refills: 3 | Status: SHIPPED | OUTPATIENT
Start: 2021-11-22 | End: 2022-03-09 | Stop reason: SDUPTHER

## 2021-11-23 ENCOUNTER — PATIENT OUTREACH (OUTPATIENT)
Dept: CASE MANAGEMENT | Age: 81
End: 2021-11-23

## 2021-11-23 NOTE — PROGRESS NOTES
Complex Case Management      Date/Time:  2021 4:05 PM    Method of communication with patient:phone    [facility] ([facility]) contacted the patient by telephone to perform Ambulatory Care Coordination. Verified name and  (PHI) with patient as identifiers. Provided introduction to self, and explanation of the Ambulatory Care Manager's role. Reviewed most recent clinic visit w/ patient who verbalized understanding. Patient given an opportunity to ask questions. Top Challenges reviewed with the patient   1. B/P 151/82 today       The patient agrees to contact the PCP office or the Ambulatory Care Manager for questions related to their healthcare. Provided contact information for future reference. Disease Specific:   CHF - weight - 148.4 today. Denies any edema or chest pain. Reports shortness of breath is \"about the same\"    Medication(s):   Medication reconciliation was performed with patient, who verbalizes understanding of administration of home medications. There were no barriers to obtaining medications identified at this time. Referral to Pharm D needed: no     Current Outpatient Medications   Medication Sig    amiodarone (CORDARONE) 200 mg tablet Take 0.5 Tablets by mouth daily. Indications: prevention of recurrent atrial fibrillation    furosemide (LASIX) 20 mg tablet TAKE 1 TABLET BY MOUTH EVERY DAY    umeclidinium-vilanteroL (Anoro Ellipta) 62.5-25 mcg/actuation inhaler Take 1 Puff by inhalation daily.  albuterol (PROVENTIL HFA, VENTOLIN HFA, PROAIR HFA) 90 mcg/actuation inhaler Take 2 Puffs by inhalation every six (6) hours as needed for Wheezing or Shortness of Breath.  inhalational spacing device Use with Albuterol inhaler    carvediloL (COREG) 12.5 mg tablet TAKE 1 TABLET BY MOUTH TWICE A DAY    isosorbide mononitrate ER (IMDUR) 30 mg tablet TAKE 1 TABLET BY MOUTH EVERY DAY.  PLEASE SCHEDULE FOLLOW UP WITH DOCTOR    potassium chloride SR (KLOR-CON 10) 10 mEq tablet TAKE 1 TABLET BY MOUTH EVERY DAY    magnesium oxide (MAG-OX) 400 mg tablet TAKE 1 TABLET BY MOUTH EVERY DAY    Oxygen 3LPM continuous    finasteride (PROSCAR) 5 mg tablet Take 1 Tablet by mouth daily.  tamsulosin (FLOMAX) 0.4 mg capsule Take 1 Capsule by mouth daily.  aspirin delayed-release 81 mg tablet TAKE 1 TABLET BY MOUTH EVERY DAY    olmesartan (BENICAR) 40 mg tablet Take 40 mg by mouth daily. Indications: high blood pressure    OLANZapine (ZyPREXA) 5 mg tablet Take 5 mg by mouth nightly.  atorvastatin (LIPITOR) 40 mg tablet TAKE 1 TABLET BY MOUTH EVERY DAY    amLODIPine (NORVASC) 10 mg tablet TAKE 1 TABLET BY MOUTH DAILY    OLANZapine (ZYPREXA) 2.5 mg tablet Take 2.5 mg by mouth daily. Take 1 tab PO at bedtime    umeclidinium-vilanteroL (Anoro Ellipta) 62.5-25 mcg/actuation inhaler Take 1 Puff by inhalation daily.  Eliquis 5 mg tablet TAKE 1 TABLET BY MOUTH TWICE DAILY (Patient not taking: Reported on 11/23/2021)     No current facility-administered medications for this visit. [facility] follow up appointment(s):   Future Appointments   Date Time Provider Solitario Eugenei   12/27/2021  2:40 PM aHi Babcock [address]   4/21/2022 11:00 AM Lee Ann Garcia MD [facility] BS AMB   5/16/2022 11:30 AM Carol Calderon MD [facility] BS AMB        Non-[facility] follow up appointment(s): NA    Goals Addressed                 This Visit's Progress     Prevent Heart Failure worsening   On track     Patient will begin an exercise program- cycling 15 min. , 3 times a week. Will slowly increase time and days. -Adhere to cardiac diet- low fat, low cholesterol, no added salt  -Patient will assess heart failure zone daily and notify physician if  in Yellow zone    Green zone. · weight is stable. This means it is not going up or down. · breathing easily. · sleeping well, able to lie flat without shortness of breath. · can perform usual activities. Yellow zone. symptoms are changing.  Call doctor. · new or increased shortness of breath. · dizzy or lightheaded, feeling faint. · sudden weight gain, such as 3 pounds or more in 2 to 3 days. · increased swelling in legs, ankles, or feet. · to tired or weak , unable to perform usual activities. · not sleeping well. Shortness of breath at night. need for extra pillows. Red zone. This is an emergency. Call 911.                 symptoms of sudden heart failure, such as:  · severe trouble breathing. · coughing up pink, foamy mucus. · a new irregular or fast heartbeat.   Patient will make and go to all appointments     Patient will be able to verbalize HF zones and when to contact physician by 7/30/21

## 2021-11-28 DIAGNOSIS — Z76.0 MEDICATION REFILL: ICD-10-CM

## 2021-11-28 RX ORDER — TAMSULOSIN HYDROCHLORIDE 0.4 MG/1
CAPSULE ORAL
Qty: 90 CAPSULE | Refills: 1 | Status: SHIPPED | OUTPATIENT
Start: 2021-11-28 | End: 2022-04-21

## 2021-11-29 DIAGNOSIS — I25.10 CHRONIC CORONARY ARTERY DISEASE: Primary | ICD-10-CM

## 2021-11-29 DIAGNOSIS — I50.9 CONGESTIVE HEART FAILURE, UNSPECIFIED HF CHRONICITY, UNSPECIFIED HEART FAILURE TYPE (HCC): ICD-10-CM

## 2021-11-29 DIAGNOSIS — Z79.899 LONG TERM CURRENT USE OF AMIODARONE: ICD-10-CM

## 2021-11-29 DIAGNOSIS — M79.89 SWELLING OF LOWER EXTREMITY: ICD-10-CM

## 2021-11-29 DIAGNOSIS — R55 SYNCOPE AND COLLAPSE: ICD-10-CM

## 2021-11-30 ENCOUNTER — PATIENT OUTREACH (OUTPATIENT)
Dept: CASE MANAGEMENT | Age: 81
End: 2021-11-30

## 2021-11-30 DIAGNOSIS — Z76.0 MEDICATION REFILL: ICD-10-CM

## 2021-11-30 RX ORDER — FINASTERIDE 5 MG/1
TABLET, FILM COATED ORAL
Qty: 90 TABLET | Refills: 1 | Status: SHIPPED | OUTPATIENT
Start: 2021-11-30 | End: 2022-05-20

## 2021-11-30 NOTE — PROGRESS NOTES
Complex Case Management       Date/Time:  11/30/2021 12:13 PM     Attempted to reach patient by telephone. Left HIPPA compliant message requesting a return call. Patient has graduated from the Complex Case Management  program on 11/30/21 . Patient's symptoms are stable at this time. Patient/family has the ability to self-manage. Care management goals have been completed at this time. No further AC follow up scheduled. Goals Addressed                 This Visit's Progress     COMPLETED: Prevent Heart Failure worsening        Patient will begin an exercise program- cycling 15 min. , 3 times a week. Will slowly increase time and days. -Adhere to cardiac diet- low fat, low cholesterol, no added salt  -Patient will assess heart failure zone daily and notify physician if  in Yellow zone    Green zone. · weight is stable. This means it is not going up or down. · breathing easily. · sleeping well, able to lie flat without shortness of breath. · can perform usual activities. Yellow zone. symptoms are changing. Call doctor. · new or increased shortness of breath. · dizzy or lightheaded, feeling faint. · sudden weight gain, such as 3 pounds or more in 2 to 3 days. · increased swelling in legs, ankles, or feet. · to tired or weak , unable to perform usual activities. · not sleeping well. Shortness of breath at night. need for extra pillows. Red zone. This is an emergency. Call 911.                 symptoms of sudden heart failure, such as:  · severe trouble breathing. · coughing up pink, foamy mucus. · a new irregular or fast heartbeat. Patient will make and go to all appointments     Patient will be able to verbalize HF zones and when to contact physician by 7/30/21              Pt has ACM's contact information for any further questions, concerns, or needs.   Patients upcoming visits:    Future Appointments   Date Time Provider Solitario Fuchs   12/27/2021  2:40 PM [facility] EVITA SCHED   4/21/2022 11:00 AM Stacy Walker MD [facility] AMB   5/16/2022 11:30 AM Vanessa Gonzalez MD [facility] BS AMB

## 2021-12-01 DIAGNOSIS — Z79.899 MEDICATION MANAGEMENT: ICD-10-CM

## 2021-12-01 RX ORDER — POTASSIUM CHLORIDE 750 MG/1
TABLET, FILM COATED, EXTENDED RELEASE ORAL
Qty: 90 TABLET | Refills: 1 | Status: SHIPPED | OUTPATIENT
Start: 2021-12-01 | End: 2022-03-09 | Stop reason: SDUPTHER

## 2021-12-02 RX ORDER — ISOSORBIDE MONONITRATE 30 MG/1
TABLET, EXTENDED RELEASE ORAL
Qty: 90 TABLET | Refills: 0 | Status: SHIPPED | OUTPATIENT
Start: 2021-12-02 | End: 2022-03-09 | Stop reason: SDUPTHER

## 2021-12-28 RX ORDER — OLMESARTAN MEDOXOMIL 40 MG/1
TABLET ORAL
Qty: 90 TABLET | Refills: 3 | Status: SHIPPED | OUTPATIENT
Start: 2021-12-28 | End: 2022-03-09 | Stop reason: SDUPTHER

## 2022-01-24 DIAGNOSIS — E87.6 HYPOKALEMIA: ICD-10-CM

## 2022-01-24 DIAGNOSIS — Z79.899 MEDICATION MANAGEMENT: ICD-10-CM

## 2022-01-24 RX ORDER — LANOLIN ALCOHOL/MO/W.PET/CERES
CREAM (GRAM) TOPICAL
Qty: 90 TABLET | Refills: 1 | Status: SHIPPED | OUTPATIENT
Start: 2022-01-24 | End: 2022-08-12 | Stop reason: SDUPTHER

## 2022-02-16 ENCOUNTER — OFFICE VISIT (OUTPATIENT)
Dept: INTERNAL MEDICINE CLINIC | Age: 82
End: 2022-02-16
Payer: MEDICARE

## 2022-02-16 ENCOUNTER — HOSPITAL ENCOUNTER (OUTPATIENT)
Dept: LAB | Age: 82
Discharge: HOME | End: 2022-02-16
Payer: MEDICARE

## 2022-02-16 VITALS
WEIGHT: 150 LBS | RESPIRATION RATE: 18 BRPM | DIASTOLIC BLOOD PRESSURE: 81 MMHG | SYSTOLIC BLOOD PRESSURE: 139 MMHG | TEMPERATURE: 97.1 F | OXYGEN SATURATION: 91 % | BODY MASS INDEX: 27.6 KG/M2 | HEART RATE: 59 BPM | HEIGHT: 62 IN

## 2022-02-16 DIAGNOSIS — F20.9 CHRONIC SCHIZOPHRENIA (HCC): ICD-10-CM

## 2022-02-16 DIAGNOSIS — J44.9 COPD, MILD (HCC): ICD-10-CM

## 2022-02-16 DIAGNOSIS — E27.8 LEFT ADRENAL MASS (HCC): ICD-10-CM

## 2022-02-16 DIAGNOSIS — I48.0 PAROXYSMAL ATRIAL FIBRILLATION (HCC): ICD-10-CM

## 2022-02-16 DIAGNOSIS — E11.22 TYPE 2 DIABETES MELLITUS WITH CHRONIC KIDNEY DISEASE, WITHOUT LONG-TERM CURRENT USE OF INSULIN, UNSPECIFIED CKD STAGE (HCC): ICD-10-CM

## 2022-02-16 DIAGNOSIS — I10 ESSENTIAL HYPERTENSION: ICD-10-CM

## 2022-02-16 DIAGNOSIS — I10 ESSENTIAL HYPERTENSION: Primary | ICD-10-CM

## 2022-02-16 DIAGNOSIS — I27.20 PULMONARY HYPERTENSION (HCC): ICD-10-CM

## 2022-02-16 LAB
ALBUMIN SERPL-MCNC: 4.1 G/DL (ref 3.4–5)
ALBUMIN/GLOB SERPL: 1.2 {RATIO} (ref 0.8–1.7)
ALP SERPL-CCNC: 101 U/L (ref 45–117)
ALT SERPL-CCNC: 50 U/L (ref 16–61)
ANION GAP SERPL CALC-SCNC: 7 MMOL/L (ref 3–18)
AST SERPL-CCNC: 31 U/L (ref 10–38)
BASOPHILS # BLD: 0.1 K/UL (ref 0–0.1)
BASOPHILS NFR BLD: 1 % (ref 0–2)
BILIRUB SERPL-MCNC: 1.6 MG/DL (ref 0.2–1)
BUN SERPL-MCNC: 19 MG/DL (ref 7–18)
BUN/CREAT SERPL: 14 (ref 12–20)
CALCIUM SERPL-MCNC: 9.3 MG/DL (ref 8.5–10.1)
CHLORIDE SERPL-SCNC: 110 MMOL/L (ref 100–111)
CHOLEST SERPL-MCNC: 86 MG/DL
CO2 SERPL-SCNC: 26 MMOL/L (ref 21–32)
CREAT SERPL-MCNC: 1.39 MG/DL (ref 0.6–1.3)
DIFFERENTIAL METHOD BLD: ABNORMAL
EOSINOPHIL # BLD: 0.2 K/UL (ref 0–0.4)
EOSINOPHIL NFR BLD: 3 % (ref 0–5)
ERYTHROCYTE [DISTWIDTH] IN BLOOD BY AUTOMATED COUNT: 19.9 % (ref 11.6–14.5)
GLOBULIN SER CALC-MCNC: 3.4 G/DL (ref 2–4)
GLUCOSE SERPL-MCNC: 84 MG/DL (ref 74–99)
HBA1C MFR BLD: 5.7 % (ref 4.2–5.6)
HCT VFR BLD AUTO: 44.6 % (ref 36–48)
HDLC SERPL-MCNC: 51 MG/DL (ref 40–60)
HDLC SERPL: 1.7 {RATIO} (ref 0–5)
HGB BLD-MCNC: 14.5 G/DL (ref 13–16)
IMM GRANULOCYTES # BLD AUTO: 0 K/UL (ref 0–0.04)
IMM GRANULOCYTES NFR BLD AUTO: 0 % (ref 0–0.5)
LDLC SERPL CALC-MCNC: 26 MG/DL (ref 0–100)
LIPID PROFILE,FLP: NORMAL
LYMPHOCYTES # BLD: 1.4 K/UL (ref 0.9–3.6)
LYMPHOCYTES NFR BLD: 24 % (ref 21–52)
MCH RBC QN AUTO: 20.5 PG (ref 24–34)
MCHC RBC AUTO-ENTMCNC: 32.5 G/DL (ref 31–37)
MCV RBC AUTO: 63 FL (ref 78–100)
MONOCYTES # BLD: 0.8 K/UL (ref 0.05–1.2)
MONOCYTES NFR BLD: 13 % (ref 3–10)
NEUTS SEG # BLD: 3.6 K/UL (ref 1.8–8)
NEUTS SEG NFR BLD: 59 % (ref 40–73)
NRBC # BLD: 0.12 K/UL (ref 0–0.01)
NRBC BLD-RTO: 2 PER 100 WBC
PLATELET # BLD AUTO: ABNORMAL K/UL (ref 135–420)
POTASSIUM SERPL-SCNC: 4.2 MMOL/L (ref 3.5–5.5)
PROT SERPL-MCNC: 7.5 G/DL (ref 6.4–8.2)
RBC # BLD AUTO: 7.08 M/UL (ref 4.35–5.65)
SODIUM SERPL-SCNC: 143 MMOL/L (ref 136–145)
TRIGL SERPL-MCNC: 45 MG/DL (ref ?–150)
VLDLC SERPL CALC-MCNC: 9 MG/DL
WBC # BLD AUTO: 6 K/UL (ref 4.6–13.2)

## 2022-02-16 PROCEDURE — 80053 COMPREHEN METABOLIC PANEL: CPT

## 2022-02-16 PROCEDURE — 80061 LIPID PANEL: CPT

## 2022-02-16 PROCEDURE — 85025 COMPLETE CBC W/AUTO DIFF WBC: CPT

## 2022-02-16 PROCEDURE — 99214 OFFICE O/P EST MOD 30 MIN: CPT | Performed by: INTERNAL MEDICINE

## 2022-02-16 PROCEDURE — 36415 COLL VENOUS BLD VENIPUNCTURE: CPT

## 2022-02-16 PROCEDURE — 83036 HEMOGLOBIN GLYCOSYLATED A1C: CPT

## 2022-02-16 NOTE — PROGRESS NOTES
1. \"Have you been to the ER, urgent care clinic since your last visit? Hospitalized since your last visit? \" No    2. \"Have you seen or consulted any other health care providers outside of the [address] [place] [address] since your last visit? \" No     3. For patients aged 39-70: Has the patient had a colonoscopy / FIT/ Cologuard? NA - based on age      If the patient is female:    4. For patients aged 41-77: Has the patient had a mammogram within the past 2 years? NA - based on age or sex      11. For patients aged 21-65: Has the patient had a pap smear? NA - based on age or sex      S/w the [facility] and informed of what the pt needs to do. Notified the pt' needs to screen the pt for HHA services. Ms. Vandana Lyons states the pt must contact [facility] for a home health screening. [facility] has 30 days to complete this screening and approve or deny. Pt must call [phone], adult protective services to tart this case. Contacted pt and left a message with this information.

## 2022-02-16 NOTE — PROGRESS NOTES
HISTORY OF PRESENT ILLNESS  Cesilia Koch is a 80 y.o. male. /81 (BP 1 Location: Left upper arm, BP Patient Position: Sitting, BP Cuff Size: Adult)   Pulse (!) 59   Temp 97.1 °F (36.2 °C) (Temporal)   Resp 18   Ht 5' 2\" (1.575 m)   Wt 150 lb (68 kg)   SpO2 91% Comment: w/o o2  BMI 27.44 kg/m²     Pt has new insurance. [employer] ([employer])  He states he was told also he needs an \"authorization\" for help at home      He is still having trouble getting his portable Oxygen   He was told to use either [employer] ([phone]) or [employer] ([phone]) now for this but it was covered. He also needs some help at home. Housework, etc,     Diabetes  The history is provided by the patient. This is a chronic problem. The current episode started more than 1 week ago. The problem occurs daily. The problem has not changed since onset. Associated symptoms include shortness of breath (chronic, baseline). Pertinent negatives include no chest pain. Review of Systems   Constitutional: Negative for chills and fever. Respiratory: Positive for shortness of breath (chronic, baseline). Cardiovascular: Negative for chest pain and palpitations. Physical Exam  Vitals and nursing note reviewed. Constitutional:       General: He is not in acute distress. Appearance: Normal appearance. He is well-developed. He is not diaphoretic. HENT:      Head: Normocephalic and atraumatic. Cardiovascular:      Rate and Rhythm: Normal rate and regular rhythm. Pulmonary:      Effort: Pulmonary effort is normal.      Breath sounds: Normal breath sounds. Musculoskeletal:      Right lower leg: No edema. Left lower leg: No edema. Skin:     General: Skin is warm and dry. Neurological:      Mental Status: He is alert and oriented to person, place, and time.    Psychiatric:         Mood and Affect: Mood normal.         Behavior: Behavior normal.         ASSESSMENT and PLAN    ICD-10-CM ICD-9-CM    1. Essential hypertension  C32 095.1 METABOLIC PANEL, COMPREHENSIVE      LIPID PANEL   2. COPD, mild (HCC)  J44.9 496 CBC WITH AUTOMATED DIFF   3. Pulmonary hypertension (HCC)  I27.20 416.8    4. Type 2 diabetes mellitus with chronic kidney disease, without long-term current use of insulin, unspecified CKD stage ([facility])  H19.58 991.53 METABOLIC PANEL, COMPREHENSIVE     585.9 HEMOGLOBIN A1C W/O EAG    last HBA1c normal. May be steroid induced as well. 5. Paroxysmal atrial fibrillation ([facility])  I48.0 427.31    6. Chronic schizophrenia ([facility]  F20.9 295.62    7. Left adrenal mass ([facility])  E27.8 255.8        BP controlled    Due for updated lab    Will refer him back to Dr. Tierra Barrios to address portable oxygen as his insurance has changed  Advised him he also needs to schedule a f/u appt with her.     Will check with local personal care agencies and consult them to evaluate for a home health aide    F/u here as appointed in April

## 2022-02-16 NOTE — Clinical Note
Please re-address Mr. Sharmaine Padilla' request for portable oxygen as his insurance has changed.   Thanks

## 2022-02-17 ENCOUNTER — TELEPHONE (OUTPATIENT)
Dept: PULMONOLOGY | Age: 82
End: 2022-02-17

## 2022-02-18 ENCOUNTER — TELEPHONE (OUTPATIENT)
Dept: PULMONOLOGY | Age: 82
End: 2022-02-18

## 2022-02-18 NOTE — TELEPHONE ENCOUNTER
Patient called back re oxygen testing. He states someone did come to his home to test him  I called Fozia Evans to get DMT results and he did find testing for him in Nov and he did qualify for a setting of 4 with the POC which the results went to [address] [place] [address]. Fozia Evans to discuss with Bruce Yuan who would have gotten the report to set the patient up with a POC.

## 2022-02-18 NOTE — TELEPHONE ENCOUNTER
Patient questioning the POC that was ordered with his stationary oxygen. He has not been tested for the POC. The order was back in Nov. Needs another appt since so long and then DMT to do a test for a POC and order. Patient due to get CT in April and return after. He was advised of this and at that visit he needs to address the want for a POC.

## 2022-03-01 ENCOUNTER — TELEPHONE (OUTPATIENT)
Dept: PULMONOLOGY | Age: 82
End: 2022-03-01

## 2022-03-01 NOTE — TELEPHONE ENCOUNTER
Pt asking to speak with nurse re oxygen. Would not give any other information.  Please advise [phone]

## 2022-03-02 NOTE — TELEPHONE ENCOUNTER
Phone not working. CMN was already sent to [address] [place] [address] Eugenio Dyer yesterday for the POC.

## 2022-03-09 DIAGNOSIS — Z76.0 MEDICATION REFILL: ICD-10-CM

## 2022-03-09 DIAGNOSIS — I25.10 CHRONIC CORONARY ARTERY DISEASE: Chronic | ICD-10-CM

## 2022-03-09 DIAGNOSIS — E78.5 HYPERLIPIDEMIA, UNSPECIFIED HYPERLIPIDEMIA TYPE: Chronic | ICD-10-CM

## 2022-03-09 DIAGNOSIS — I10 ESSENTIAL HYPERTENSION: Chronic | ICD-10-CM

## 2022-03-09 DIAGNOSIS — I50.9 CONGESTIVE HEART FAILURE, UNSPECIFIED HF CHRONICITY, UNSPECIFIED HEART FAILURE TYPE (HCC): ICD-10-CM

## 2022-03-09 DIAGNOSIS — Z79.899 MEDICATION MANAGEMENT: ICD-10-CM

## 2022-03-09 NOTE — TELEPHONE ENCOUNTER
PCP: Homa Tolbert MD    Last appt: 11/15/2021  Future Appointments   Date Time Provider Solitario Fuchs   4/21/2022 11:00 AM Homa Tolbert MD [facility] AMB   5/16/2022 11:30 AM Mirna Mohs, MD [facility] BS AMB       Requested Prescriptions     Pending Prescriptions Disp Refills    amiodarone (CORDARONE) 200 mg tablet 90 Tablet 3     Sig: Take 0.5 Tablets by mouth daily. Indications: prevention of recurrent atrial fibrillation    amLODIPine (NORVASC) 10 mg tablet 90 Tablet 3     Sig: TAKE 1 TABLET BY MOUTH DAILY    aspirin delayed-release 81 mg tablet 90 Tablet 3     Sig: Take 1 Tablet by mouth daily.  atorvastatin (LIPITOR) 40 mg tablet 90 Tablet 4     Sig: TAKE 1 TABLET BY MOUTH EVERY DAY    carvediloL (COREG) 12.5 mg tablet 180 Tablet 3     Sig: Take 1 Tablet by mouth two (2) times a day.  furosemide (LASIX) 20 mg tablet 90 Tablet 3     Sig: Take 1 Tablet by mouth daily.  isosorbide mononitrate ER (IMDUR) 30 mg tablet 90 Tablet 3     Sig: TAKE 1 TABLET BY MOUTH EVERY DAY.  olmesartan (BENICAR) 40 mg tablet 90 Tablet 3     Sig: Take 1 Tablet by mouth daily.  potassium chloride SR (KLOR-CON 10) 10 mEq tablet 90 Tablet 3     Sig: Take 1 Tablet by mouth daily. Pharmacy: Pharmacy change to mail order per pt.

## 2022-03-10 ENCOUNTER — TELEPHONE (OUTPATIENT)
Dept: CARDIOLOGY CLINIC | Age: 82
End: 2022-03-10

## 2022-03-10 RX ORDER — AMLODIPINE BESYLATE 10 MG/1
TABLET ORAL
Qty: 90 TABLET | Refills: 3 | Status: SHIPPED | OUTPATIENT
Start: 2022-03-10 | End: 2022-03-28 | Stop reason: SDUPTHER

## 2022-03-10 RX ORDER — AMIODARONE HYDROCHLORIDE 200 MG/1
100 TABLET ORAL DAILY
Qty: 90 TABLET | Refills: 3 | Status: SHIPPED | OUTPATIENT
Start: 2022-03-10 | End: 2022-03-28 | Stop reason: SDUPTHER

## 2022-03-10 RX ORDER — ISOSORBIDE MONONITRATE 30 MG/1
TABLET, EXTENDED RELEASE ORAL
Qty: 90 TABLET | Refills: 3 | Status: SHIPPED | OUTPATIENT
Start: 2022-03-10 | End: 2022-05-19

## 2022-03-10 RX ORDER — FUROSEMIDE 20 MG/1
20 TABLET ORAL DAILY
Qty: 90 TABLET | Refills: 3 | Status: SHIPPED | OUTPATIENT
Start: 2022-03-10 | End: 2022-03-24 | Stop reason: SDUPTHER

## 2022-03-10 RX ORDER — ATORVASTATIN CALCIUM 40 MG/1
TABLET, FILM COATED ORAL
Qty: 90 TABLET | Refills: 4 | Status: SHIPPED | OUTPATIENT
Start: 2022-03-10 | End: 2022-03-24 | Stop reason: SDUPTHER

## 2022-03-10 RX ORDER — OLMESARTAN MEDOXOMIL 40 MG/1
40 TABLET ORAL DAILY
Qty: 90 TABLET | Refills: 3 | Status: SHIPPED | OUTPATIENT
Start: 2022-03-10 | End: 2022-04-21

## 2022-03-10 RX ORDER — ASPIRIN 81 MG/1
81 TABLET ORAL DAILY
Qty: 90 TABLET | Refills: 3 | Status: SHIPPED | OUTPATIENT
Start: 2022-03-10

## 2022-03-10 RX ORDER — POTASSIUM CHLORIDE 750 MG/1
10 TABLET, FILM COATED, EXTENDED RELEASE ORAL DAILY
Qty: 90 TABLET | Refills: 3 | Status: SHIPPED | OUTPATIENT
Start: 2022-03-10 | End: 2022-06-29 | Stop reason: SDUPTHER

## 2022-03-10 RX ORDER — CARVEDILOL 12.5 MG/1
12.5 TABLET ORAL 2 TIMES DAILY
Qty: 180 TABLET | Refills: 3 | Status: SHIPPED | OUTPATIENT
Start: 2022-03-10 | End: 2022-05-19

## 2022-03-18 PROBLEM — H90.5 SENSORY HEARING LOSS: Status: ACTIVE | Noted: 2019-11-08

## 2022-03-18 PROBLEM — I27.22 PULMONARY HYPERTENSION DUE TO LEFT HEART DISEASE (HCC): Status: ACTIVE | Noted: 2021-11-11

## 2022-03-18 PROBLEM — R78.81 GRAM-POSITIVE BACTEREMIA: Status: ACTIVE | Noted: 2019-11-08

## 2022-03-18 PROBLEM — Z79.899 ON AMIODARONE THERAPY: Status: ACTIVE | Noted: 2018-03-17

## 2022-03-18 PROBLEM — K11.7 APTYALISM: Status: ACTIVE | Noted: 2019-11-08

## 2022-03-18 PROBLEM — I38 HEART VALVE DISEASE: Status: ACTIVE | Noted: 2019-11-08

## 2022-03-18 PROBLEM — I48.0 PAROXYSMAL ATRIAL FIBRILLATION (HCC): Status: ACTIVE | Noted: 2018-03-17

## 2022-03-18 PROBLEM — R33.9 RETENTION OF URINE: Status: ACTIVE | Noted: 2019-11-08

## 2022-03-18 PROBLEM — I51.7 LVH (LEFT VENTRICULAR HYPERTROPHY): Status: ACTIVE | Noted: 2018-03-17

## 2022-03-19 PROBLEM — J18.9 PNEUMONIA DUE TO INFECTIOUS ORGANISM: Status: ACTIVE | Noted: 2018-03-08

## 2022-03-19 PROBLEM — R65.10 SIRS (SYSTEMIC INFLAMMATORY RESPONSE SYNDROME) (HCC): Status: ACTIVE | Noted: 2018-02-08
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
## 2022-03-19 PROBLEM — R77.8 ELEVATED TROPONIN: Status: ACTIVE | Noted: 2018-02-07

## 2022-03-19 PROBLEM — R79.89 ELEVATED TROPONIN: Status: ACTIVE | Noted: 2018-02-07

## 2022-03-19 PROBLEM — R06.02 SHORTNESS OF BREATH: Status: ACTIVE | Noted: 2019-11-08

## 2022-03-19 PROBLEM — I45.10 RBBB: Status: ACTIVE | Noted: 2018-03-17

## 2022-03-19 PROBLEM — Z92.89 PERSONAL HISTORY OF OTHER MEDICAL TREATMENT: Status: ACTIVE | Noted: 2019-11-08

## 2022-03-19 PROBLEM — J96.11 CHRONIC HYPOXEMIC RESPIRATORY FAILURE (HCC): Status: ACTIVE | Noted: 2021-11-11

## 2022-03-19 PROBLEM — E78.5 HYPERLIPIDEMIA: Status: ACTIVE | Noted: 2018-01-25

## 2022-03-19 PROBLEM — R09.02 HYPOXIA: Status: ACTIVE | Noted: 2019-11-08

## 2022-03-19 PROBLEM — R55 SYNCOPE AND COLLAPSE: Status: ACTIVE | Noted: 2017-07-19

## 2022-03-19 PROBLEM — R91.1 LUNG NODULE: Status: ACTIVE | Noted: 2018-03-08

## 2022-03-19 PROBLEM — E11.9 TYPE 2 DIABETES MELLITUS WITHOUT COMPLICATION, WITHOUT LONG-TERM CURRENT USE OF INSULIN (HCC): Status: ACTIVE | Noted: 2018-01-25

## 2022-03-19 PROBLEM — R07.9 CHEST PAIN: Status: ACTIVE | Noted: 2021-04-27

## 2022-03-19 PROBLEM — I51.89 DIASTOLIC DYSFUNCTION: Status: ACTIVE | Noted: 2018-03-17

## 2022-03-20 PROBLEM — J44.9 CHRONIC OBSTRUCTIVE PULMONARY DISEASE (HCC): Status: ACTIVE | Noted: 2019-11-08

## 2022-03-20 PROBLEM — I33.0 INFECTIVE ENDOCARDITIS: Status: ACTIVE | Noted: 2019-11-08

## 2022-03-20 PROBLEM — R41.82 ALTERED MENTAL STATUS: Status: ACTIVE | Noted: 2019-11-08

## 2022-03-20 PROBLEM — R00.2 PALPITATIONS: Status: ACTIVE | Noted: 2019-11-08

## 2022-03-20 PROBLEM — N40.0 ENLARGED PROSTATE: Status: ACTIVE | Noted: 2019-11-08

## 2022-03-20 PROBLEM — E27.8 LEFT ADRENAL MASS (HCC): Status: ACTIVE | Noted: 2017-06-25

## 2022-03-20 PROBLEM — I37.0 PULMONIC STENOSIS: Status: ACTIVE | Noted: 2018-12-28

## 2022-03-20 PROBLEM — E11.22 TYPE 2 DIABETES MELLITUS WITH CHRONIC KIDNEY DISEASE (HCC): Status: ACTIVE | Noted: 2018-02-03

## 2022-03-24 ENCOUNTER — TELEPHONE (OUTPATIENT)
Dept: PULMONOLOGY | Age: 82
End: 2022-03-24

## 2022-03-24 DIAGNOSIS — I25.10 CHRONIC CORONARY ARTERY DISEASE: Chronic | ICD-10-CM

## 2022-03-24 DIAGNOSIS — I50.9 CONGESTIVE HEART FAILURE, UNSPECIFIED HF CHRONICITY, UNSPECIFIED HEART FAILURE TYPE (HCC): ICD-10-CM

## 2022-03-24 DIAGNOSIS — Z76.0 MEDICATION REFILL: ICD-10-CM

## 2022-03-24 DIAGNOSIS — E78.5 HYPERLIPIDEMIA, UNSPECIFIED HYPERLIPIDEMIA TYPE: Chronic | ICD-10-CM

## 2022-03-24 RX ORDER — ATORVASTATIN CALCIUM 40 MG/1
TABLET, FILM COATED ORAL
Qty: 90 TABLET | Refills: 4 | Status: SHIPPED | OUTPATIENT
Start: 2022-03-24 | End: 2022-04-29

## 2022-03-24 RX ORDER — FUROSEMIDE 20 MG/1
20 TABLET ORAL DAILY
Qty: 90 TABLET | Refills: 3 | Status: SHIPPED | OUTPATIENT
Start: 2022-03-24 | End: 2022-04-21

## 2022-03-24 NOTE — TELEPHONE ENCOUNTER
PCP: Bj Ceron MD    Last appt: 11/15/2021  Future Appointments   Date Time Provider Solitario Fuchs   4/21/2022 11:00 AM Bj Ceron MD [facility] BS AMB   5/16/2022 11:30 AM Vikram Ricks MD [facility] BS AMB       Requested Prescriptions      No prescriptions requested or ordered in this encounter

## 2022-03-25 RX ORDER — UMECLIDINIUM BROMIDE AND VILANTEROL TRIFENATATE 62.5; 25 UG/1; UG/1
1 POWDER RESPIRATORY (INHALATION) DAILY
Qty: 3 EACH | Refills: 1 | Status: SHIPPED | COMMUNITY
Start: 2022-03-25

## 2022-03-28 DIAGNOSIS — I25.10 CHRONIC CORONARY ARTERY DISEASE: Chronic | ICD-10-CM

## 2022-03-28 DIAGNOSIS — E78.5 HYPERLIPIDEMIA, UNSPECIFIED HYPERLIPIDEMIA TYPE: Chronic | ICD-10-CM

## 2022-03-28 NOTE — TELEPHONE ENCOUNTER
PCP: Quynh Ross MD    Last appt: 11/15/2021  Future Appointments   Date Time Provider Solitario Fuchs   4/21/2022 11:00 AM Quynh Ross MD [facility] BS AMB   5/16/2022 11:30 AM Sabrina Richter MD [facility] BS AMB       Requested Prescriptions     Pending Prescriptions Disp Refills    amLODIPine (NORVASC) 10 mg tablet 90 Tablet 3     Sig: TAKE 1 TABLET BY MOUTH DAILY    amiodarone (CORDARONE) 200 mg tablet 90 Tablet 3     Sig: Take 0.5 Tablets by mouth daily.  Indications: prevention of recurrent atrial fibrillation

## 2022-03-29 RX ORDER — AMIODARONE HYDROCHLORIDE 200 MG/1
100 TABLET ORAL DAILY
Qty: 90 TABLET | Refills: 3 | Status: SHIPPED | OUTPATIENT
Start: 2022-03-29

## 2022-03-29 RX ORDER — AMLODIPINE BESYLATE 10 MG/1
TABLET ORAL
Qty: 90 TABLET | Refills: 3 | Status: SHIPPED | OUTPATIENT
Start: 2022-03-29

## 2022-04-06 RX ORDER — OLMESARTAN MEDOXOMIL 40 MG/1
40 TABLET ORAL DAILY
Qty: 90 TABLET | Refills: 1 | Status: SHIPPED | OUTPATIENT
Start: 2022-04-06 | End: 2022-04-21

## 2022-04-06 NOTE — TELEPHONE ENCOUNTER
Patient is requesting refill   Requested Prescriptions     Pending Prescriptions Disp Refills    olmesartan (BENICAR) 40 mg tablet 90 Tablet 1     Sig: Take 1 Tablet by mouth daily.  Indications: high blood pressure

## 2022-04-20 ENCOUNTER — TELEPHONE (OUTPATIENT)
Dept: PULMONOLOGY | Age: 82
End: 2022-04-20

## 2022-04-20 NOTE — TELEPHONE ENCOUNTER
Pt states that he is on 3l on his portable O2 but it only lasts 3 hours. He would like to know if it would cause any harm for him to lower it.  Please advise [phone]

## 2022-04-21 ENCOUNTER — OFFICE VISIT (OUTPATIENT)
Dept: INTERNAL MEDICINE CLINIC | Age: 82
End: 2022-04-21
Payer: MEDICARE

## 2022-04-21 VITALS
OXYGEN SATURATION: 95 % | SYSTOLIC BLOOD PRESSURE: 131 MMHG | RESPIRATION RATE: 18 BRPM | BODY MASS INDEX: 26.31 KG/M2 | DIASTOLIC BLOOD PRESSURE: 87 MMHG | WEIGHT: 143 LBS | HEIGHT: 62 IN | HEART RATE: 67 BPM | TEMPERATURE: 97.1 F

## 2022-04-21 DIAGNOSIS — F20.9 CHRONIC SCHIZOPHRENIA (HCC): ICD-10-CM

## 2022-04-21 DIAGNOSIS — I10 ESSENTIAL HYPERTENSION: Primary | ICD-10-CM

## 2022-04-21 DIAGNOSIS — N18.31 TYPE 2 DIABETES MELLITUS WITH STAGE 3A CHRONIC KIDNEY DISEASE, WITHOUT LONG-TERM CURRENT USE OF INSULIN (HCC): ICD-10-CM

## 2022-04-21 DIAGNOSIS — R91.1 LUNG NODULE: ICD-10-CM

## 2022-04-21 DIAGNOSIS — E11.22 TYPE 2 DIABETES MELLITUS WITH STAGE 3A CHRONIC KIDNEY DISEASE, WITHOUT LONG-TERM CURRENT USE OF INSULIN (HCC): ICD-10-CM

## 2022-04-21 PROCEDURE — 3044F HG A1C LEVEL LT 7.0%: CPT | Performed by: INTERNAL MEDICINE

## 2022-04-21 PROCEDURE — G8536 NO DOC ELDER MAL SCRN: HCPCS | Performed by: INTERNAL MEDICINE

## 2022-04-21 PROCEDURE — G8427 DOCREV CUR MEDS BY ELIG CLIN: HCPCS | Performed by: INTERNAL MEDICINE

## 2022-04-21 PROCEDURE — G8419 CALC BMI OUT NRM PARAM NOF/U: HCPCS | Performed by: INTERNAL MEDICINE

## 2022-04-21 PROCEDURE — 99214 OFFICE O/P EST MOD 30 MIN: CPT | Performed by: INTERNAL MEDICINE

## 2022-04-21 PROCEDURE — G8752 SYS BP LESS 140: HCPCS | Performed by: INTERNAL MEDICINE

## 2022-04-21 PROCEDURE — 1101F PT FALLS ASSESS-DOCD LE1/YR: CPT | Performed by: INTERNAL MEDICINE

## 2022-04-21 PROCEDURE — G8510 SCR DEP NEG, NO PLAN REQD: HCPCS | Performed by: INTERNAL MEDICINE

## 2022-04-21 PROCEDURE — G8754 DIAS BP LESS 90: HCPCS | Performed by: INTERNAL MEDICINE

## 2022-04-21 NOTE — PROGRESS NOTES
1. \"Have you been to the ER, urgent care clinic since your last visit? Hospitalized since your last visit? \" No    2. \"Have you seen or consulted any other health care providers outside of the [address] [place] [address] since your last visit? \" No     3. For patients aged 39-70: Has the patient had a colonoscopy / FIT/ Cologuard? NA - based on age      If the patient is female:    4. For patients aged 41-77: Has the patient had a mammogram within the past 2 years? NA - based on age or sex      11. For patients aged 21-65: Has the patient had a pap smear? NA - based on age or sex      This patient is accompanied in the office by his sister.

## 2022-04-21 NOTE — PROGRESS NOTES
HISTORY OF PRESENT ILLNESS  Radha Brooks is a 80 y.o. male. /87 (BP 1 Location: Left upper arm, BP Patient Position: Sitting, BP Cuff Size: Adult)   Pulse 67   Temp 97.1 °F (36.2 °C) (Temporal)   Resp 18   Ht 5' 2\" (1.575 m)   Wt 143 lb (64.9 kg)   SpO2 95% Comment: 2L/min  BMI 26.16 kg/m²     Here with his sister. She looks out for him      Doesn't eat like he used to. Has lost another 7 #      He got his portable oxygen concentrator            Current Outpatient Medications:     amLODIPine (NORVASC) 10 mg tablet, TAKE 1 TABLET BY MOUTH DAILY, Disp: 90 Tablet, Rfl: 3    amiodarone (CORDARONE) 200 mg tablet, Take 0.5 Tablets by mouth daily. Indications: prevention of recurrent atrial fibrillation, Disp: 90 Tablet, Rfl: 3    umeclidinium-vilanteroL (Anoro Ellipta) 62.5-25 mcg/actuation inhaler, Take 1 Puff by inhalation daily. , Disp: 3 Each, Rfl: 1    atorvastatin (LIPITOR) 40 mg tablet, TAKE 1 TABLET BY MOUTH EVERY DAY, Disp: 90 Tablet, Rfl: 4    aspirin delayed-release 81 mg tablet, Take 1 Tablet by mouth daily. , Disp: 90 Tablet, Rfl: 3    carvediloL (COREG) 12.5 mg tablet, Take 1 Tablet by mouth two (2) times a day., Disp: 180 Tablet, Rfl: 3    isosorbide mononitrate ER (IMDUR) 30 mg tablet, TAKE 1 TABLET BY MOUTH EVERY DAY., Disp: 90 Tablet, Rfl: 3    potassium chloride SR (KLOR-CON 10) 10 mEq tablet, Take 1 Tablet by mouth daily. , Disp: 90 Tablet, Rfl: 3    magnesium oxide (MAG-OX) 400 mg tablet, TAKE 1 TABLET BY MOUTH EVERY DAY, Disp: 90 Tablet, Rfl: 1    finasteride (PROSCAR) 5 mg tablet, TAKE 1 TABLET BY MOUTH EVERY DAY, Disp: 90 Tablet, Rfl: 1    albuterol (PROVENTIL HFA, VENTOLIN HFA, PROAIR HFA) 90 mcg/actuation inhaler, Take 2 Puffs by inhalation every six (6) hours as needed for Wheezing or Shortness of Breath., Disp: 18 g, Rfl: 5    inhalational spacing device, Use with Albuterol inhaler, Disp: 1 Each, Rfl: 3    Oxygen, 3LPM continuous when walking .  2LPM when sitting, Disp: , Rfl:     OLANZapine (ZyPREXA) 5 mg tablet, Take 5 mg by mouth nightly., Disp: , Rfl:       Hypertension   The history is provided by the patient. This is a chronic problem. The current episode started more than 1 week ago. The problem has not changed since onset. Pertinent negatives include no chest pain, no palpitations, no headaches, no dizziness and no shortness of breath. There are no associated agents to hypertension. Risk factors include diabetes mellitus and dyslipidemia. Diabetes  The history is provided by the patient. This is a chronic problem. The current episode started more than 1 week ago. Pertinent negatives include no chest pain, no headaches and no shortness of breath. Cholesterol Problem  The history is provided by the patient. This is a chronic problem. Pertinent negatives include no chest pain, no headaches and no shortness of breath. COPD  The history is provided by the patient. This is a chronic problem. The current episode started more than 1 week ago. Pertinent negatives include no chest pain, no headaches and no shortness of breath. Review of Systems   Constitutional: Negative. Respiratory: Negative for cough, sputum production, shortness of breath and wheezing. Cardiovascular: Negative for chest pain and palpitations. Gastrointestinal: Negative. Neurological: Negative for dizziness and headaches. Physical Exam  Vitals and nursing note reviewed. Constitutional:       General: He is not in acute distress. Appearance: Normal appearance. He is well-developed. He is not diaphoretic. HENT:      Head: Normocephalic and atraumatic. Cardiovascular:      Rate and Rhythm: Normal rate and regular rhythm. Pulmonary:      Effort: Pulmonary effort is normal.      Breath sounds: Normal breath sounds. Musculoskeletal:      Right lower leg: No edema. Left lower leg: No edema. Skin:     General: Skin is warm and dry.    Neurological:      Mental Status: He is alert and oriented to person, place, and time. Comments: Diabetic foot exam:     Left Foot:   Visual Exam:    Pulse DP: 2+ (normal)   Filament test: normal sensation    Vibratory sensation: diminished      Right Foot:   Visual Exam: normal    Pulse DP: 2+ (normal)   Filament test: normal sensation    Vibratory sensation: diminished     Psychiatric:         Mood and Affect: Mood normal.         Behavior: Behavior normal.         ASSESSMENT and PLAN    ICD-10-CM ICD-9-CM    1. Essential hypertension  C52 594.4 METABOLIC PANEL, COMPREHENSIVE      LIPID PANEL   2. Type 2 diabetes mellitus with stage 3a chronic kidney disease, without long-term current use of insulin ([facility])  S97.74 195.66 METABOLIC PANEL, COMPREHENSIVE    N18.31 585.3  DIABETES FOOT EXAM      HEMOGLOBIN A1C W/O EAG   3. Chronic schizophrenia ([facility]  F20.9 295.62    4. Lung nodule  R91.1 793.11 CT CHEST W WO CONT       BP controlled    DM is essentially resolved with diet and weight loss. On no meds currently    Discussed weight loss. Asked him not to lose much more  He feels well otherwise so doubt any significant underlying disease    But he had a nodule noted on CT in October.  Needs f/u CT with contrast. His kidney function is slightly diminished    Continue same meds and doses    Encourage pt to get an eye exam    F/u 4 months for HTN, DM, chol

## 2022-04-22 LAB
ALBUMIN SERPL-MCNC: 4.6 G/DL (ref 3.6–4.6)
ALBUMIN/GLOB SERPL: 1.9 {RATIO} (ref 1.2–2.2)
ALP SERPL-CCNC: 105 IU/L (ref 44–121)
ALT SERPL-CCNC: 35 IU/L (ref 0–44)
AST SERPL-CCNC: 26 IU/L (ref 0–40)
BILIRUB SERPL-MCNC: 1.5 MG/DL (ref 0–1.2)
BUN SERPL-MCNC: 14 MG/DL (ref 8–27)
BUN/CREAT SERPL: 9 (ref 10–24)
CALCIUM SERPL-MCNC: 9.1 MG/DL (ref 8.6–10.2)
CHLORIDE SERPL-SCNC: 98 MMOL/L (ref 96–106)
CHOLEST SERPL-MCNC: 100 MG/DL (ref 100–199)
CO2 SERPL-SCNC: 22 MMOL/L (ref 20–29)
CREAT SERPL-MCNC: 1.53 MG/DL (ref 0.76–1.27)
EGFR: 45 ML/MIN/1.73
GLOBULIN SER CALC-MCNC: 2.4 G/DL (ref 1.5–4.5)
GLUCOSE SERPL-MCNC: 107 MG/DL (ref 65–99)
HBA1C MFR BLD: 5.9 % (ref 4.8–5.6)
HDLC SERPL-MCNC: 51 MG/DL
IMP & REVIEW OF LAB RESULTS: NORMAL
LDLC SERPL CALC-MCNC: 38 MG/DL (ref 0–99)
POTASSIUM SERPL-SCNC: 4.4 MMOL/L (ref 3.5–5.2)
PROT SERPL-MCNC: 7 G/DL (ref 6–8.5)
SODIUM SERPL-SCNC: 137 MMOL/L (ref 134–144)
TRIGL SERPL-MCNC: 41 MG/DL (ref 0–149)
VLDLC SERPL CALC-MCNC: 11 MG/DL (ref 5–40)

## 2022-04-29 DIAGNOSIS — I50.9 CONGESTIVE HEART FAILURE, UNSPECIFIED HF CHRONICITY, UNSPECIFIED HEART FAILURE TYPE (HCC): ICD-10-CM

## 2022-04-29 DIAGNOSIS — I25.10 CHRONIC CORONARY ARTERY DISEASE: Chronic | ICD-10-CM

## 2022-04-29 DIAGNOSIS — E78.5 HYPERLIPIDEMIA, UNSPECIFIED HYPERLIPIDEMIA TYPE: Chronic | ICD-10-CM

## 2022-04-29 RX ORDER — ATORVASTATIN CALCIUM 40 MG/1
TABLET, FILM COATED ORAL
Qty: 90 TABLET | Refills: 4 | Status: SHIPPED | OUTPATIENT
Start: 2022-04-29

## 2022-05-15 DIAGNOSIS — Z76.0 MEDICATION REFILL: ICD-10-CM

## 2022-05-15 RX ORDER — FUROSEMIDE 20 MG/1
TABLET ORAL
Qty: 90 TABLET | Refills: 1 | Status: SHIPPED | OUTPATIENT
Start: 2022-05-15 | End: 2022-09-02

## 2022-05-16 ENCOUNTER — OFFICE VISIT (OUTPATIENT)
Dept: CARDIOLOGY CLINIC | Age: 82
End: 2022-05-16
Payer: MEDICARE

## 2022-05-16 VITALS
SYSTOLIC BLOOD PRESSURE: 126 MMHG | WEIGHT: 146.2 LBS | DIASTOLIC BLOOD PRESSURE: 80 MMHG | BODY MASS INDEX: 26.74 KG/M2 | HEART RATE: 59 BPM | OXYGEN SATURATION: 92 % | TEMPERATURE: 98.1 F

## 2022-05-16 DIAGNOSIS — I48.0 PAROXYSMAL ATRIAL FIBRILLATION (HCC): Primary | ICD-10-CM

## 2022-05-16 PROCEDURE — G8419 CALC BMI OUT NRM PARAM NOF/U: HCPCS | Performed by: INTERNAL MEDICINE

## 2022-05-16 PROCEDURE — G8510 SCR DEP NEG, NO PLAN REQD: HCPCS | Performed by: INTERNAL MEDICINE

## 2022-05-16 PROCEDURE — G8752 SYS BP LESS 140: HCPCS | Performed by: INTERNAL MEDICINE

## 2022-05-16 PROCEDURE — G8754 DIAS BP LESS 90: HCPCS | Performed by: INTERNAL MEDICINE

## 2022-05-16 PROCEDURE — 99214 OFFICE O/P EST MOD 30 MIN: CPT | Performed by: INTERNAL MEDICINE

## 2022-05-16 PROCEDURE — 1101F PT FALLS ASSESS-DOCD LE1/YR: CPT | Performed by: INTERNAL MEDICINE

## 2022-05-16 PROCEDURE — G8536 NO DOC ELDER MAL SCRN: HCPCS | Performed by: INTERNAL MEDICINE

## 2022-05-16 PROCEDURE — G8427 DOCREV CUR MEDS BY ELIG CLIN: HCPCS | Performed by: INTERNAL MEDICINE

## 2022-05-16 NOTE — PROGRESS NOTES
Identified pt with two pt identifiers(name and ). Reviewed record in preparation for visit and have obtained necessary documentation. Tila Fried presents today for   Chief Complaint   Patient presents with    Follow-up       Pt denies DIZZINESS, SOB, CHEST PAIN/ PRESSURE, FATIGUE/WEAKNESS, HEADACHES, SWELLING. [facility] preferred language for health care discussion is english/other. Personal Protective Equipment:   Personal Protective Equipment was used including: mask-surgical and hands-gloves. Patient was placed on no precaution(s). Patient was masked. Precautions:   Patient currently on None  Patient currently roomed with door closed. Is someone accompanying this pt? yes    Is the patient using any DME equipment during OV? Yes. oxygen    Depression Screening:  3 most recent PHQ Screens 2022   PHQ Not Done -   Little interest or pleasure in doing things Not at all   Feeling down, depressed, irritable, or hopeless Not at all   Total Score PHQ 2 0       Learning Assessment:  Learning Assessment 2019   PRIMARY LEARNER Patient   HIGHEST LEVEL OF EDUCATION - PRIMARY LEARNER  -   BARRIERS PRIMARY LEARNER -   CO-LEARNER CAREGIVER -   PRIMARY LANGUAGE ENGLISH   LEARNER PREFERENCE PRIMARY DEMONSTRATION   ANSWERED BY pt   RELATIONSHIP SELF       Abuse Screening:  Abuse Screening Questionnaire 5/10/2018   Do you ever feel afraid of your partner? N   Are you in a relationship with someone who physically or mentally threatens you? N   Is it safe for you to go home? Y       Fall Risk  Fall Risk Assessment, last 12 mths 11/15/2021   Able to walk? Yes   Fall in past 12 months? 0   Do you feel unsteady? 0   Are you worried about falling 0   Number of falls in past 12 months -   Fall with injury? -       Pt currently taking Anticoagulant therapy?no  Pt currently taking Antiplatelet therapy? yes    Coordination of Care:  1.  Have you been to the ER, urgent care clinic since your last visit? Hospitalized since your last visit? No     2. Have you seen or consulted any other health care providers outside of the [address] [place] [address] since your last visit? Include any pap smears or colon screening. Yes. Please see Red banners under Allergies and Med Rec to remove outside inquires. All correct information has been verified with patient and added to chart.      Medication's patient's would liked removed has been marked not taking to be removed per Verbal order and read back per Reji Cook MD

## 2022-05-19 DIAGNOSIS — I25.10 CHRONIC CORONARY ARTERY DISEASE: Chronic | ICD-10-CM

## 2022-05-19 DIAGNOSIS — Z79.899 MEDICATION MANAGEMENT: ICD-10-CM

## 2022-05-19 DIAGNOSIS — I10 ESSENTIAL HYPERTENSION: Chronic | ICD-10-CM

## 2022-05-19 RX ORDER — ISOSORBIDE MONONITRATE 30 MG/1
TABLET, EXTENDED RELEASE ORAL
Qty: 90 TABLET | Refills: 3 | Status: SHIPPED | OUTPATIENT
Start: 2022-05-19

## 2022-05-19 RX ORDER — CARVEDILOL 12.5 MG/1
TABLET ORAL
Qty: 180 TABLET | Refills: 3 | Status: SHIPPED | OUTPATIENT
Start: 2022-05-19

## 2022-05-20 DIAGNOSIS — Z76.0 MEDICATION REFILL: ICD-10-CM

## 2022-05-20 RX ORDER — ALBUTEROL SULFATE 90 UG/1
AEROSOL, METERED RESPIRATORY (INHALATION)
Qty: 8.5 EACH | Refills: 5 | Status: SHIPPED | OUTPATIENT
Start: 2022-05-20

## 2022-05-20 RX ORDER — FINASTERIDE 5 MG/1
TABLET, FILM COATED ORAL
Qty: 90 TABLET | Refills: 1 | Status: SHIPPED | OUTPATIENT
Start: 2022-05-20 | End: 2022-09-02

## 2022-05-20 RX ORDER — TAMSULOSIN HYDROCHLORIDE 0.4 MG/1
CAPSULE ORAL
Qty: 90 CAPSULE | Refills: 1 | Status: SHIPPED | OUTPATIENT
Start: 2022-05-20 | End: 2022-09-02

## 2022-05-24 NOTE — PROGRESS NOTES
Subjective:      Dung Bartholomew is seen today for cardiac re-evaluation. He is a 80 y.o. man with multiple medical problems including coronary artery disease, cardiomyopathy with ejection fraction of 50%, pulmonic stenosis, prior history of endocarditis of the pulmonic valve, paroxysmal atrial fibrillation, pulmonary hypertension, schizophrenia, chronic Amiodarone therapy, hyperlipidemia, diabetes and hypertension. The patient was seen in early 2019 for follow up of  elevated LFTs. The patient is on Amiodarone therapy with a starting date which was not completely clear. He did require a cardioversion  in 2016 which was likely to have been the time he was started on Amiodarone. His Amiodarone was decreased to 100 mg daily at last visit. His LFTs were rechecked and April of this year of 2019. Results are listed below. He is also on statin therapy. In the office today, he reports that he was taken off the Eliquis secondary to hematuria. He reports he is feeling \"pretty good \". He does experience  mild shortness of breath with walking approximately 75 feet. He has no PND. He sleeps on 2 pillows for comfort. He has had some mild swelling in his lower extremities.   He has had no palpitations, dizziness, or lightheadedness he has lost 9 pounds since his last visit    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    Chronic obstructive pulmonary disease ([address] 11/08/2019    Enlarged prostate 11/08/2019    Fall 11/08/2019    Infective endocarditis 11/08/2019    Hypoxia 11/08/2019    Heart valve disease 11/08/2019    Gram-positive bacteremia 11/08/2019    Shortness of breath 11/08/2019    Sensory hearing loss 11/08/2019    Retention of urine 11/08/2019    Personal history of other medical treatment 11/08/2019    Palpitations 11/08/2019    Pulmonic stenosis 12/28/2018    Pulmonary hypertension (HCC)     LVH (left ventricular hypertrophy) 03/17/2018    On amiodarone therapy 03/17/2018    RBBB 03/17/2018    Paroxysmal atrial fibrillation ([facility] 34/94/8173    Diastolic dysfunction 35/81/9258    Pneumonia due to infectious organism 03/08/2018    Lung nodule 03/08/2018    SIRS (systemic inflammatory response syndrome) (HCC) 02/08/2018    Elevated troponin 02/07/2018    Type 2 diabetes mellitus with chronic kidney disease ([facility] 02/03/2018    Type 2 diabetes mellitus without complication, without long-term current use of insulin ([facility] 01/25/2018    Hyperlipidemia 01/25/2018    Syncope and collapse 07/19/2017    Left adrenal mass ([facility] 06/25/2017    Acute bacterial endocarditis 09/28/2016    ENID (acute kidney injury) ([facility] 09/09/2016    Chronic systolic congestive heart failure ([facility] 02/08/2016    Thalassemia 01/27/2016    Gastroesophageal reflux disease with esophagitis 01/27/2016    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    Swelling of lower extremity 01/27/2016    Hypokalemia 01/27/2016    Visual hallucinations 01/27/2016    Essential hypertension 01/27/2016    Congestive heart failure (HCC) 01/27/2016    Chronic schizophrenia ([facility] 01/27/2016    Chronic kidney disease 01/27/2016    Gout 01/27/2016    Chronic coronary artery disease 01/27/2016    Hematuria 01/27/2016    Acute on chronic diastolic heart failure ([facility] 12/30/2015    Non-adherence to medical treatment 11/26/2015    GERD (gastroesophageal reflux disease) 04/23/2011    QT prolongation 04/23/2011    Refusal of blood transfusions as patient is [employer] 03/17/2011     Current Outpatient Medications   Medication Sig Dispense Refill    furosemide (LASIX) 20 mg tablet TAKE 1 TABLET BY MOUTH EVERY DAY 90 Tablet 1    atorvastatin (LIPITOR) 40 mg tablet TAKE 1 TABLET BY MOUTH EVERY DAY 90 Tablet 4    amLODIPine (NORVASC) 10 mg tablet TAKE 1 TABLET BY MOUTH DAILY 90 Tablet 3    amiodarone (CORDARONE) 200 mg tablet Take 0.5 Tablets by mouth daily. Indications: prevention of recurrent atrial fibrillation 90 Tablet 3    umeclidinium-vilanteroL (Anoro Ellipta) 62.5-25 mcg/actuation inhaler Take 1 Puff by inhalation daily. 3 Each 1    aspirin delayed-release 81 mg tablet Take 1 Tablet by mouth daily. 90 Tablet 3    potassium chloride SR (KLOR-CON 10) 10 mEq tablet Take 1 Tablet by mouth daily. 90 Tablet 3    magnesium oxide (MAG-OX) 400 mg tablet TAKE 1 TABLET BY MOUTH EVERY DAY 90 Tablet 1    inhalational spacing device Use with Albuterol inhaler 1 Each 3    Oxygen 3LPM continuous when walking . 2LPM when sitting      OLANZapine (ZyPREXA) 5 mg tablet Take 5 mg by mouth nightly.  finasteride (PROSCAR) 5 mg tablet TAKE 1 TABLET BY MOUTH EVERY DAY 90 Tablet 1    albuterol (PROVENTIL HFA, VENTOLIN HFA, PROAIR HFA) 90 mcg/actuation inhaler TAKE 2 PUFFS BY INHALATION EVERY SIX HOURS AS NEEDED FOR WHEEZING OR SHORTNESS OF BREATH. 8.5 Each 5    tamsulosin (FLOMAX) 0.4 mg capsule TAKE 1 CAPSULE BY MOUTH EVERY DAY 90 Capsule 1    carvediloL (COREG) 12.5 mg tablet TAKE 1 TABLET BY MOUTH TWICE A  Tablet 3    isosorbide mononitrate ER (IMDUR) 30 mg tablet TAKE 1 TABLET BY MOUTH EVERY DAY. PLEASE SCHEDULE FOLLOW UP WITH DOCTOR  Indications: prevention of anginal chest pain associated with coronary artery disease 90 Tablet 3     No Known Allergies  Past Medical History:   Diagnosis Date    Adrenal myelolipoma     Benign non-nodular prostatic hyperplasia with lower urinary tract symptoms     Beta thalassemia trait     Blepharitis 05/04/2022    CAD (coronary artery disease)     Cardiomyopathy (HCC)     EF 30%    Diabetes mellitus ([facility]     Diabetic nephropathy (HCC)     Diverticulosis     Elevated PSA     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  Faecalis    Enlarged prostate     GERD (gastroesophageal reflux disease)     Glaucoma suspect of both eyes 05/04/2022    Gout     [facility] failure 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     Pulmonary hypertension ([facility]     Rectal polyp     Right renal mass     Schizophrenia ([facility]     Senile nuclear cataract 05/04/2022    Stopped smoking with greater than 10 pack year history     Vitamin D deficiency     Vitreous floaters, bilateral 05/04/2022    Weight loss, unintentional      Past Surgical History:   Procedure Laterality Date    HX COLONOSCOPY  04/17/2013    Dr. Prema Silva    HX ENDOSCOPY      HX ORTHOPAEDIC  4960'I    wound repair L. arm    HX TURP  03/01/2017     TRANSURETHRAL RESECTION OF PROSTATE;  Surgeon: Mike Cordoba MD; [facility]     Family History   Problem Relation Age of Onset    Hypertension Mother     Heart Disease Mother     Hypertension Father     Hypertension Brother      Social History     Tobacco Use   Smoking Status Former Smoker    Packs/day: 0.50    Years: 30.00    Pack years: 15.00    Types: Cigarettes   Smokeless Tobacco Never Used          Review of Systems, additional:  Constitutional: negative  Eyes: negative  Respiratory: positive for dyspnea on exertion - improving  Cardiovascular: positive for dyspnea on exertion - improving  Gastrointestinal: negative  Musculoskeletal:negative  Neurological: negative  Behvioral/Psych: negative  Endocrine: negative  ENT: negative    Objective:     Visit Vitals  /80   Pulse (!) 59   Temp 98.1 °F (36.7 °C) (Temporal)   Wt 66.3 kg (146 lb 3.2 oz)   SpO2 92%   BMI 26.74 kg/m²     General:  alert, cooperative, no distress Chest Wall: inspection normal - no chest wall deformities or tenderness, respiratory effort normal   Lung: clear to auscultation bilaterally   Heart:  normal rate and regular rhythm, S1 and S2 normal, systolic murmur 2/6 at the left second intercostal space border   Abdomen: soft, non-tender. Bowel sounds normal. No masses,  no organomegaly   Extremities: 1+ pitting edema to lower extremities Skin: no rashes   Neuro: alert, oriented, normal speech, no focal findings or movement disorder noted         Assessment/Plan:         ICD-10-CM ICD-9-CM    1. LVH (left ventricular hypertrophy), moderate by recent echo 02/07/2018. Other findings including severely dilated RV and pulmonary hypertension, moderate pulmonic stenosis with last transvalvular velocity in 2016, 3.2 m/s. He was supposed to have a repeat echocardiogram for the last 2 visits. Will reorder. I51.7 429.3    2. Hyperlipidemia, unspecified hyperlipidemia type Mom 4/21/2022. Total cholesterol 100. Triglycerides 41. HDL 51. LDL 38. The patient is is taking atorvastatin 40 mg daily. E78.5 272.4    3. Chronic coronary artery disease, nuclear stress test done 02/2016; small apical lateral scar with no significant ischemia. I25.10 414.00    4. Congestive heart failure, unspecified congestive heart failure chronicity, unspecified congestive heart failure type (HCC) RT 4 mos I50.9 428.0    5. Essential hypertension, controlled   BP in office today. I10 401.9    6. Type 2 diabetes mellitus with nephropathy (HCC) E11.21 250.40      583.81    7. Chronic kidney disease, unspecified CKD stage N18.9 585.9    8. Chronic schizophrenia ([facility] F20.9 295.62    9. On amiodarone therapy, taking 100 mg daily. Total bilirubin 1.5. AST and ALT WNL. Z79.899 V58.69    10. Paroxysmal atrial fibrillation ([facility] S/P cardioversion 1/4/2016, on amiodarone 100 mg daily I48.0 427.31    11. Pulmonic stenosis, return in 6 months with echocardiogram done at that time.

## 2022-06-16 ENCOUNTER — TELEPHONE (OUTPATIENT)
Dept: PULMONOLOGY | Age: 82
End: 2022-06-16

## 2022-06-16 NOTE — TELEPHONE ENCOUNTER
Kj from [address] [place] [address] sent over a fax for a script for portable O2. He wants to know if the fax has been receive?

## 2022-06-27 ENCOUNTER — TELEPHONE (OUTPATIENT)
Dept: INTERNAL MEDICINE CLINIC | Age: 82
End: 2022-06-27

## 2022-06-27 DIAGNOSIS — Z79.899 MEDICATION MANAGEMENT: ICD-10-CM

## 2022-06-29 DIAGNOSIS — Z79.899 MEDICATION MANAGEMENT: ICD-10-CM

## 2022-06-29 RX ORDER — POTASSIUM CHLORIDE 750 MG/1
10 TABLET, FILM COATED, EXTENDED RELEASE ORAL DAILY
Qty: 90 TABLET | Refills: 3 | Status: SHIPPED | OUTPATIENT
Start: 2022-06-29

## 2022-06-29 NOTE — TELEPHONE ENCOUNTER
Pharmacy requesting refill. Last visit 04/21/22. Next visit 08/12/22    Requested Prescriptions     Pending Prescriptions Disp Refills    potassium chloride SR (KLOR-CON 10) 10 mEq tablet 90 Tablet 3     Sig: Take 1 Tablet by mouth daily.

## 2022-07-25 ENCOUNTER — OFFICE VISIT (OUTPATIENT)
Dept: PULMONOLOGY | Age: 82
End: 2022-07-25
Payer: MEDICARE

## 2022-07-25 VITALS
SYSTOLIC BLOOD PRESSURE: 163 MMHG | DIASTOLIC BLOOD PRESSURE: 94 MMHG | TEMPERATURE: 97.1 F | OXYGEN SATURATION: 94 % | HEIGHT: 62 IN | BODY MASS INDEX: 26.87 KG/M2 | WEIGHT: 146 LBS | HEART RATE: 54 BPM | RESPIRATION RATE: 18 BRPM

## 2022-07-25 DIAGNOSIS — J44.9 COPD, MILD (HCC): ICD-10-CM

## 2022-07-25 DIAGNOSIS — R59.0 HILAR ADENOPATHY: ICD-10-CM

## 2022-07-25 DIAGNOSIS — I27.20 PULMONARY HYPERTENSION (HCC): ICD-10-CM

## 2022-07-25 DIAGNOSIS — Z99.81 HYPOXEMIA REQUIRING SUPPLEMENTAL OXYGEN: ICD-10-CM

## 2022-07-25 DIAGNOSIS — R09.02 HYPOXEMIA REQUIRING SUPPLEMENTAL OXYGEN: ICD-10-CM

## 2022-07-25 DIAGNOSIS — R91.1 LUNG NODULE: Primary | ICD-10-CM

## 2022-07-25 PROCEDURE — G8536 NO DOC ELDER MAL SCRN: HCPCS | Performed by: INTERNAL MEDICINE

## 2022-07-25 PROCEDURE — G8753 SYS BP > OR = 140: HCPCS | Performed by: INTERNAL MEDICINE

## 2022-07-25 PROCEDURE — 1101F PT FALLS ASSESS-DOCD LE1/YR: CPT | Performed by: INTERNAL MEDICINE

## 2022-07-25 PROCEDURE — G8755 DIAS BP > OR = 90: HCPCS | Performed by: INTERNAL MEDICINE

## 2022-07-25 PROCEDURE — G8417 CALC BMI ABV UP PARAM F/U: HCPCS | Performed by: INTERNAL MEDICINE

## 2022-07-25 PROCEDURE — 99214 OFFICE O/P EST MOD 30 MIN: CPT | Performed by: INTERNAL MEDICINE

## 2022-07-25 PROCEDURE — G8427 DOCREV CUR MEDS BY ELIG CLIN: HCPCS | Performed by: INTERNAL MEDICINE

## 2022-07-25 PROCEDURE — G8510 SCR DEP NEG, NO PLAN REQD: HCPCS | Performed by: INTERNAL MEDICINE

## 2022-07-25 PROCEDURE — 1123F ACP DISCUSS/DSCN MKR DOCD: CPT | Performed by: INTERNAL MEDICINE

## 2022-07-25 NOTE — PROGRESS NOTES
Sola Hernandez presents today for   Chief Complaint   Patient presents with    New Patient       Is someone accompanying this pt? no    Is the patient using any DME equipment during OV? [employer] 2 lier while walking 3 lier at [address] Replaced by [facility] first Choice    Depression Screening:  3 most recent PHQ Screens 7/25/2022   PHQ Not Done -   Little interest or pleasure in doing things Not at all   Feeling down, depressed, irritable, or hopeless Not at all   Total Score PHQ 2 0       Learning Assessment:  Learning Assessment 6/12/2019   PRIMARY LEARNER Patient   HIGHEST LEVEL OF EDUCATION - PRIMARY LEARNER  -   BARRIERS PRIMARY LEARNER -   CO-LEARNER CAREGIVER -   PRIMARY LANGUAGE ENGLISH   LEARNER PREFERENCE PRIMARY DEMONSTRATION   ANSWERED BY pt   RELATIONSHIP SELF       Abuse Screening:  Abuse Screening Questionnaire 5/10/2018   Do you ever feel afraid of your partner? N   Are you in a relationship with someone who physically or mentally threatens you? N   Is it safe for you to go home? Y       Fall Risk  Fall Risk Assessment, last 12 mths 5/16/2022   Able to walk? Yes   Fall in past 12 months? 0   Do you feel unsteady? 0   Are you worried about falling 0   Number of falls in past 12 months -   Fall with injury? -         Coordination of Care:  1. Have you been to the ER, urgent care clinic since your last visit? Hospitalized since your last visit? No    2. Have you seen or consulted any other health care providers outside of the [address] [place] [address] since your last visit? Include any pap smears or colon screening.  No

## 2022-07-25 NOTE — PROGRESS NOTES
HISTORY OF PRESENT ILLNESS  Ghazal Bernal is a 80 y.o. male following for chronic hypoxemia. Pt is a former smoker with ling standing shortness of breath with mild exertion. History is obtained mainly from pt's sister and outside records review. Pt's dyspnea improved somewhat on supplemental O2 but pt finds the portable O2 tanks cumbersome. He is still short of breath with walking moderate distances. Pt denies orthopnea or PND. He has waxing and waning pedal edema. Pt has a history of cardiomyopathy with slightly reduced EF. Echo also shows pulmonary hypertension, PASP 65 mm Hg. Review of historical Echos also show a positive bubble study in 2016. CT scan in 2018 revealed a new 2 x 1.6 cm pulmonary nodule. Follow up CT ordered after his initial visit shows a 1.8 cm nodule vs R perihilar LN. CT with contrast was ordered multiple times in the past however not done for unknown reasons. Review of Systems   Constitutional:  Positive for malaise/fatigue. Negative for chills, diaphoresis, fever and weight loss. HENT:  Negative for congestion, ear discharge, ear pain, hearing loss, nosebleeds, sinus pain, sore throat and tinnitus. Eyes:  Negative for blurred vision, double vision, photophobia, pain, discharge and redness. Respiratory:  Positive for shortness of breath (with exertion). Negative for cough, hemoptysis, sputum production and stridor. Wheezing: rare. Cardiovascular:  Negative for chest pain, palpitations, orthopnea, claudication, leg swelling and PND. Gastrointestinal:  Negative for abdominal pain, blood in stool, constipation, diarrhea, heartburn, melena, nausea and vomiting. Genitourinary:  Negative for dysuria, flank pain, frequency, hematuria and urgency. Musculoskeletal:  Positive for back pain and neck pain. Negative for falls, joint pain and myalgias. Skin:  Negative for itching and rash.    Neurological:  Negative for dizziness, tingling, tremors, sensory change, speech change, focal weakness, seizures, loss of consciousness, weakness and headaches. Endo/Heme/Allergies:  Negative for environmental allergies and polydipsia. Does not bruise/bleed easily. Psychiatric/Behavioral:  Negative for depression, hallucinations, memory loss, substance abuse and suicidal ideas. The patient is not nervous/anxious and does not have insomnia. 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 Dori Berrios    HX ENDOSCOPY      HX ORTHOPAEDIC  [address]    wound repair L. arm    HX TURP  03/01/2017     TRANSURETHRAL RESECTION OF PROSTATE;  Surgeon: Katalina Galvan MD; [facility]     Current Outpatient Medications on File Prior to Visit   Medication Sig Dispense Refill    potassium chloride SR (KLOR-CON 10) 10 mEq tablet Take 1 Tablet by mouth daily. 90 Tablet 3    finasteride (PROSCAR) 5 mg tablet TAKE 1 TABLET BY MOUTH EVERY DAY 90 Tablet 1    albuterol (PROVENTIL HFA, VENTOLIN HFA, PROAIR HFA) 90 mcg/actuation inhaler TAKE 2 PUFFS BY INHALATION EVERY SIX HOURS AS NEEDED FOR WHEEZING OR SHORTNESS OF BREATH. 8.5 Each 5    tamsulosin (FLOMAX) 0.4 mg capsule TAKE 1 CAPSULE BY MOUTH EVERY DAY 90 Capsule 1    carvediloL (COREG) 12.5 mg tablet TAKE 1 TABLET BY MOUTH TWICE A  Tablet 3    isosorbide mononitrate ER (IMDUR) 30 mg tablet TAKE 1 TABLET BY MOUTH EVERY DAY. PLEASE SCHEDULE FOLLOW UP WITH DOCTOR  Indications: prevention of anginal chest pain associated with coronary artery disease 90 Tablet 3    furosemide (LASIX) 20 mg tablet TAKE 1 TABLET BY MOUTH EVERY DAY 90 Tablet 1    atorvastatin (LIPITOR) 40 mg tablet TAKE 1 TABLET BY MOUTH EVERY DAY 90 Tablet 4    amLODIPine (NORVASC) 10 mg tablet TAKE 1 TABLET BY MOUTH DAILY 90 Tablet 3    amiodarone (CORDARONE) 200 mg tablet Take 0.5 Tablets by mouth daily. Indications: prevention of recurrent atrial fibrillation 90 Tablet 3    umeclidinium-vilanteroL (Anoro Ellipta) 62.5-25 mcg/actuation inhaler Take 1 Puff by inhalation daily. 3 Each 1    aspirin delayed-release 81 mg tablet Take 1 Tablet by mouth daily. 90 Tablet 3    magnesium oxide (MAG-OX) 400 mg tablet TAKE 1 TABLET BY MOUTH EVERY DAY 90 Tablet 1    inhalational spacing device Use with Albuterol inhaler 1 Each 3    Oxygen 3LPM continuous when walking . 2LPM when sitting      OLANZapine (ZyPREXA) 5 mg tablet Take 5 mg by mouth nightly. No current facility-administered medications on file prior to visit.      No Known Allergies  Family History   Problem Relation Age of Onset    Hypertension Mother     Heart Disease Mother     Hypertension Father     Hypertension Brother      Social History     Socioeconomic History    Marital status:      Spouse name: Not on file    Number of children: Not on file    Years of education: Not on file    Highest education level: Not on file   Occupational History    Occupation: plumbing   Tobacco Use    Smoking status: Former     Packs/day: 0.50     Years: 30.00     Pack years: 15.00     Types: Cigarettes    Smokeless tobacco: Never   Vaping Use    Vaping Use: Never used   Substance and Sexual Activity    Alcohol use: No    Drug use: No    Sexual activity: Never   Other Topics Concern    Not on file   Social History Narrative    Not on file     Social Determinants of Health     Financial Resource Strain: Not on file   Food Insecurity: Not on file   Transportation Needs: Not on file   Physical Activity: Not on file   Stress: Not on file   Social Connections: Not on file   Intimate Partner Violence: Not on file   Housing Stability: Not on file     Blood pressure (!) 163/94, pulse (!) 54, temperature 97.1 °F (36.2 °C), temperature source Temporal, resp. rate 18, height 5' 2\" (1.575 m), weight 66.2 kg (146 lb), SpO2 94 %. Physical Exam  Constitutional:       General: He is not in acute distress. Appearance: He is not ill-appearing, toxic-appearing or diaphoretic. HENT:      Head: Normocephalic and atraumatic. Right Ear: External ear normal.      Left Ear: External ear normal.      Nose:      Comments: Deferred      Mouth/Throat:      Comments: Deferred   Eyes:      General:         Right eye: No discharge. Left eye: No discharge. Extraocular Movements: Extraocular movements intact. Conjunctiva/sclera: Conjunctivae normal.      Pupils: Pupils are equal, round, and reactive to light. Neck:      Vascular: No carotid bruit. Cardiovascular:      Rate and Rhythm: Normal rate and regular rhythm. Heart sounds: Murmur (harsh holosystolic clarissa LSB, lower pitched systolic at R base unchanged) heard. Pulmonary:      Effort: Pulmonary effort is normal. No respiratory distress.       Breath sounds: Normal breath sounds. No stridor. No wheezing, rhonchi or rales. Chest:      Chest wall: No tenderness. Abdominal:      General: Abdomen is flat. Palpations: Abdomen is soft. There is no mass. Tenderness: There is no abdominal tenderness. Musculoskeletal:         General: No swelling, tenderness, deformity or signs of injury. Cervical back: No rigidity or tenderness. Right lower leg: Right lower leg edema: trace. Left lower leg: Edema (trace) present. Lymphadenopathy:      Cervical: No cervical adenopathy. Skin:     General: Skin is warm and dry. Coloration: Skin is not jaundiced or pale. Findings: No bruising, erythema, lesion or rash. Neurological:      General: No focal deficit present. Mental Status: He is alert and oriented to person, place, and time. Coordination: Coordination normal.      Gait: Abnormal gait: antalgic. Psychiatric:         Mood and Affect: Mood normal.      Comments: Slow speech and reaction time     Echo Cardiogram Complete    Narrative    CONCLUSIONS     * Left ventricular chamber volume is normal with moderate concentric   hypertrophy. * Left ventricular systolic function is mildly reduced with an ejection   fraction of 50 % by Fay's. * The anterior wall is hypokinetic. Abnormal paradoxical septal motion   consistent with right ventricular volume overload. * Flattening of the septum in diastole and systole consistent with right   ventricular volume and pressure overload. * Right ventricular cavity size is severely dilated with severely reduced   systolic function. FAC 22%. * Right atrial chamber volume is severely enlarged (69 ml/m2). Prominent   chiari network. * There is mild eccentric mitral valve regurgitation. * There is moderate tricuspid valve regurgitation. * There is moderate pulmonic regurgitation.      * Across PV  peak velocity of 3.38 m/s, peak  gradient of 46 mmHg; mean   gradient of 18 mmHg and PVA of 1.72 cm2. Increased gradients could be partly   from increase strike volume from PV regurgitation. * Moderate pulmonary hypertension, estimated pulmonary arterial systolic   pressure is 64 mmHg. * There is trivial pericardial effusion. Comparison     * Previous echo from 18 indicated a postive bubble study, EF of 50%,   severe RV dilatation with moderately reduced function, pulmonic stenosis with   a peak velocity of 3.2 m/; MG of 23 mmHg, moderate PI/TR, and PAP of 65 mmHg. Patient Info   Name:     Thanh Steele   Age:     80 years   :     1940   Gender:     Male   MRN:     [id number]   Ht:     58 in   Wt:     150 lb   BSA:     1.74 m2   HR:     66 bpm   BP:     167   /     89 mmHg   Exam Date:     2021 8:59 AM   Site Location:     [facility]   Exam Location:     [facility]   Patient Status:     Inpatient     Exam Type:     ECHO CARDIOGRAM COMPLETE     Indications        - shortness of breath       Left Ventricle     Left ventricular chamber volume is normal with moderate concentric   hypertrophy. Left ventricular systolic function is mildly reduced with an ejection   fraction of 50 % by Fay's. The anterior wall is hypokinetic. Abnormal paradoxical septal motion   consistent with right ventricular volume overload. Flattening of the septum in diastole and systole consistent with right   ventricular volume and pressure overload. Left ventricular diastolic function: indeterminate. Right Ventricle     Right ventricular cavity size is severely dilated with severely reduced   systolic function. FAC 22%. Left Atrium     Left atrial chamber is mildly enlarged with a left atrial volume index of   38.00 ml/m^2 by BP MOD. Right Atrium     Right atrial chamber volume is severely enlarged (69 ml/m2). Prominent   chiari network. Right atrial chamber volume is severely enlarged. Atrial Septum     Previously positive bubble study (18). Aortic Valve     The aortic valve is tricuspid. There is no aortic valve stenosis. There is trace aortic valve regurgitation. Pulmonic Valve     The pulmonic valve is mildly thickened with what appears normal excursion. Across PV  peak velocity of 3.38 m/s, peak  gradient of 46 mmHg; mean   gradient of 18 mmHg and PVA of 1.72 cm2. Increased gradients could be partly   from increase strike volume from PV regurgitation. There is moderate pulmonic regurgitation. Mitral Valve     The mitral valve has normal leaflets. There is no mitral valve stenosis. There is mild eccentric mitral valve regurgitation. Tricuspid Valve     The tricuspid valve leaflets are normal and thickened. There is leaflet   prolapse seen. There is no tricuspid valve stenosis. There is moderate tricuspid valve regurgitation. Moderate pulmonary hypertension, estimated pulmonary arterial systolic   pressure is 64 mmHg. Pericardium/Pleural     There is trivial pericardial effusion. Inferior Vena Cava     Dilated inferior vena cava with >50% collapse upon inspiration consistent   with elevated right atrial pressure, 8 mmHg. Aorta     The prox ascending aorta is mildly dilated measuring 3.6 cm with an index of   2.06 cm/m2. The aortic root at the sinus of Valsalva is normal measuring 3.2 cm with an   index of 1.83 cm/m2. The aortic measurements are indexed to age and body surface area.        Pulmonic Valve   ----------------------------------------------------------------------   Name                                 Value        Normal   ----------------------------------------------------------------------     PV 2D   ----------------------------------------------------------------------   RVOT Diameter (2D)                 2.70 cm     1.70-2.70     RVOT Doppler   ----------------------------------------------------------------------   RVOT Peak Velocity             111.00 cm/s RVOT Mean Gradient                  2 mmHg                 RVOT Area                         5.73 cm2                   PV Doppler   ----------------------------------------------------------------------   PV Peak Velocity               337.84 cm/s                 PV Peak Gradient                   46 mmHg                 PV Mean Gradient                   18 mmHg                 PV Area (Cont Eq VTI)             1.72 cm2                 PV Area Index (Cont Eq VTI)    0.98 cm2/m2                 PV Area (Cont Eq Darnell)             1.88 cm2                 PV Area Index (Cont Eq Darnell)    1.08 cm2/m2     Mitral Valve   ----------------------------------------------------------------------   Name                                 Value        Normal   ----------------------------------------------------------------------     MV Doppler   ----------------------------------------------------------------------   MV Decel [place]                   174 cm/s2                 MV PHT                               98 ms                 MV Area (PHT)                     2.24 cm2     7.00-4.54     MV Diastolic Function   ----------------------------------------------------------------------   MV E Peak Velocity              58.70 cm/s       <=50.00   MV A Peak Velocity             119.00 cm/s                 MV E/A                                0.49        <=1.60   MV Decel Time                       338 ms                   MV Annular TDI   ----------------------------------------------------------------------   MV Lateral e' Velocity           6.96 cm/s       >=10.00     Tricuspid Valve   ----------------------------------------------------------------------   Name                                 Value        Normal   ----------------------------------------------------------------------     TV Regurgitation Doppler   ----------------------------------------------------------------------   TR Peak Velocity 373.00 cm/s      <=280.00     Estimated PAP/RSVP   ----------------------------------------------------------------------   RA Pressure                         8 mmHg            <8   PA Systolic Pressure               64 mmHg           <89   RV Systolic Pressure               64 mmHg           <36     Aorta   ----------------------------------------------------------------------   Name                                 Value        Normal   ----------------------------------------------------------------------     Ascending Aorta   ----------------------------------------------------------------------   Sinus of Valsalva Diameter          3.2 cm         <=3.7   Sinus of Valsalva Index         1.83 cm/m2        <=1.90   Prox Asc Ao Diameter                3.6 cm         <=3.4   Prox Asc Ao Diameter Index      2.06 cm/m2        <=1.70     Venous   ----------------------------------------------------------------------   Name                                 Value        Normal   ----------------------------------------------------------------------     IVC/SVC   ----------------------------------------------------------------------   IVC Diameter (Exp 2D)              2.30 cm        <=2.10   IVC Diameter Percent Change   (2D)                                  43 %          >=50     Ventricles   ----------------------------------------------------------------------   Name                                 Value        Normal   ----------------------------------------------------------------------     LV Dimensions 2D/MM   ----------------------------------------------------------------------   IVS Diastolic Thickness (2D)       1.50 cm        <=1.04   LVID Diastole (2D)                 4.70 cm     6.41-9.49   LVPW Diastolic Thickness   (2D)                               1.50 cm        <=1.04   LVID Systole (2D)                  3.20 cm     9.91-2.55   LVID Diastolic Index (2D)       2.69 cm/m2     2.20-3.00   LV Mass (2D Cubed)                294.05 g  88..00   LV Mass Index (2D Cubed)        0.02 g/cm2     0.00-0.01   Relative Wall Thickness (2D)          0.64                   LV Fractional Shortening/Ejection Fraction 2D/MM   ----------------------------------------------------------------------   LV Diastolic Length (4C)           6.90 cm                 LV Systolic Length (4C)            6.05 cm                 LV Stroke Volume (4C MOD)         39.40 ml                   RV Dimensions 2D/MM   ----------------------------------------------------------------------   RV Basal Diastolic Dimension       6.18 cm     5.49-7.50   RV Diastolic Area (4C)           43.70 cm2   77.49-85.57   RV Systolic Area (4C)            34.10 cm2    3.00-15.00   TAPSE                               2.2 cm         >=1.7     RV Fractional Shortening 2D   ----------------------------------------------------------------------   RV FAC (4C)                           22 %          >=35     Atria   ----------------------------------------------------------------------   Name                                 Value        Normal   ----------------------------------------------------------------------     LA Dimensions   ----------------------------------------------------------------------   LA Dimension (2D)                  5.60 cm     3.00-4.10   LA Dimen Index (2D)             3.21 cm/m2                 LA Volume Index (BP A-L)       37.84 ml/m2        <35.00   LA Volume Index (BP MOD)       38.00 ml/m2   16.00-34.00     RA Dimensions   ----------------------------------------------------------------------   RA Diastolic Major Axis   Length (4C)                        7.19 cm                 RA Diastolic Major Axis   Length Index (4C)                     0.41                 RA Area (4C)                     33.00 cm2        <21.00   RA ESV (4C MOD)                  116.00 ml   18.00-32.00   RA ESV Index (4C MOD)          66.50 ml/m2       <=32.00 Report Signatures   Finalized by Vance Baca MD on 4/30/2021 12:00 PM       Technical Quality:     Lugene Gall     Staff   Ordering Provider:     Marianna Molina     Complete two-dimensional, color flow and Doppler transthoracic echocardiogram   is performed. [id number]     Sonographer:     William AN  Other Result Text    This result has an attachment that is not available. Interface, LulúBolt - 04/30/2021 12:00 PM EDT   Formatting of this note might be different from the original.   CONCLUSIONS     * Left ventricular chamber volume is normal with moderate concentric   hypertrophy. * Left ventricular systolic function is mildly reduced with an ejection   fraction of 50 % by Fay's. * The anterior wall is hypokinetic. Abnormal paradoxical septal motion   consistent with right ventricular volume overload. * Flattening of the septum in diastole and systole consistent with right   ventricular volume and pressure overload. * Right ventricular cavity size is severely dilated with severely reduced   systolic function. FAC 22%. * Right atrial chamber volume is severely enlarged (69 ml/m2). Prominent   chiari network. * There is mild eccentric mitral valve regurgitation. * There is moderate tricuspid valve regurgitation. * There is moderate pulmonic regurgitation. * Across PV  peak velocity of 3.38 m/s, peak  gradient of 46 mmHg; mean   gradient of 18 mmHg and PVA of 1.72 cm2. Increased gradients could be partly   from increase strike volume from PV regurgitation. * Moderate pulmonary hypertension, estimated pulmonary arterial systolic   pressure is 64 mmHg. * There is trivial pericardial effusion. Comparison     * Previous echo from 2/7/18 indicated a postive bubble study, EF of 50%,   severe RV dilatation with moderately reduced function, pulmonic stenosis with   a peak velocity of 3.2 m/; MG of 23 mmHg, moderate PI/TR, and PAP of 65 mmHg. Patient Info   Name:     Julian Lindsey   Age:     80 years   :     1940   Gender:     Male   MRN:     [id number]   Ht:     58 in   Wt:     150 lb   BSA:     1.74 m2   HR:     66 bpm   BP:     167   /     89 mmHg   Exam Date:     2021 8:59 AM   Site Location:     [facility]   Exam Location:     [facility]   Patient Status:     Inpatient     Exam Type:     ECHO CARDIOGRAM COMPLETE     Indications        - shortness of breath       Left Ventricle     Left ventricular chamber volume is normal with moderate concentric   hypertrophy. Left ventricular systolic function is mildly reduced with an ejection   fraction of 50 % by Fay's. The anterior wall is hypokinetic. Abnormal paradoxical septal motion   consistent with right ventricular volume overload. Flattening of the septum in diastole and systole consistent with right   ventricular volume and pressure overload. Left ventricular diastolic function: indeterminate. Right Ventricle     Right ventricular cavity size is severely dilated with severely reduced   systolic function. FAC 22%. Left Atrium     Left atrial chamber is mildly enlarged with a left atrial volume index of   38.00 ml/m^2 by BP MOD. Right Atrium     Right atrial chamber volume is severely enlarged (69 ml/m2). Prominent   chiari network. Right atrial chamber volume is severely enlarged. Atrial Septum     Previously positive bubble study (18). Aortic Valve     The aortic valve is tricuspid. There is no aortic valve stenosis. There is trace aortic valve regurgitation. Pulmonic Valve     The pulmonic valve is mildly thickened with what appears normal excursion. Across PV  peak velocity of 3.38 m/s, peak  gradient of 46 mmHg; mean   gradient of 18 mmHg and PVA of 1.72 cm2. Increased gradients could be partly   from increase strike volume from PV regurgitation. There is moderate pulmonic regurgitation.      Mitral Valve     The mitral valve has normal leaflets. There is no mitral valve stenosis. There is mild eccentric mitral valve regurgitation. Tricuspid Valve     The tricuspid valve leaflets are normal and thickened. There is leaflet   prolapse seen. There is no tricuspid valve stenosis. There is moderate tricuspid valve regurgitation. Moderate pulmonary hypertension, estimated pulmonary arterial systolic   pressure is 64 mmHg. Pericardium/Pleural     There is trivial pericardial effusion. Inferior Vena Cava     Dilated inferior vena cava with >50% collapse upon inspiration consistent   with elevated right atrial pressure, 8 mmHg. Aorta     The prox ascending aorta is mildly dilated measuring 3.6 cm with an index of   2.06 cm/m2. The aortic root at the sinus of Valsalva is normal measuring 3.2 cm with an   index of 1.83 cm/m2. The aortic measurements are indexed to age and body surface area.        Pulmonic Valve   ----------------------------------------------------------------------   Name                                 Value        Normal   ----------------------------------------------------------------------     PV 2D   ----------------------------------------------------------------------   RVOT Diameter (2D)                 2.70 cm     1.70-2.70     RVOT Doppler   ----------------------------------------------------------------------   RVOT Peak Velocity             111.00 cm/s                 RVOT Mean Gradient                  2 mmHg                 RVOT Area                         5.73 cm2                   PV Doppler   ----------------------------------------------------------------------   PV Peak Velocity               337.84 cm/s                 PV Peak Gradient                   46 mmHg                 PV Mean Gradient                   18 mmHg                 PV Area (Cont Eq VTI)             1.72 cm2                 PV Area Index (Cont Eq VTI)    0.98 cm2/m2 PV Area (Cont Eq Darnell)             1.88 cm2                 PV Area Index (Cont Eq Darnell)    1.08 cm2/m2     Mitral Valve   ----------------------------------------------------------------------   Name                                 Value        Normal   ----------------------------------------------------------------------     MV Doppler   ----------------------------------------------------------------------   MV Decel Kane                   174 cm/s2                 MV PHT                               98 ms                  MV Area (PHT)                     2.24 cm2     9.39-1.97     MV Diastolic Function   ----------------------------------------------------------------------   MV E Peak Velocity              58.70 cm/s       <=50.00   MV A Peak Velocity             119.00 cm/s                 MV E/A                                0.49        <=1.60   MV Decel Time                       338 ms                   MV Annular TDI   ----------------------------------------------------------------------   MV Lateral e' Velocity           6.96 cm/s       >=10.00     Tricuspid Valve   ----------------------------------------------------------------------   Name                                 Value        Normal   ----------------------------------------------------------------------     TV Regurgitation Doppler   ----------------------------------------------------------------------   TR Peak Velocity               373.00 cm/s      <=280.00     Estimated PAP/RSVP   ----------------------------------------------------------------------   RA Pressure                         8 mmHg            <8   PA Systolic Pressure               64 mmHg           <01   RV Systolic Pressure               64 mmHg           <36     Aorta   ----------------------------------------------------------------------   Name                                 Value        Normal ----------------------------------------------------------------------     Ascending Aorta   ----------------------------------------------------------------------   Sinus of Valsalva Diameter          3.2 cm         <=3.7   Sinus of Valsalva Index         1.83 cm/m2        <=1.90   Prox Asc Ao Diameter                3.6 cm         <=3.4   Prox Asc Ao Diameter Index      2.06 cm/m2        <=1.70     Venous   ----------------------------------------------------------------------   Name                                 Value        Normal   ----------------------------------------------------------------------     IVC/SVC   ----------------------------------------------------------------------   IVC Diameter (Exp 2D)              2.30 cm        <=2.10   IVC Diameter Percent Change   (2D)                                  43 %          >=50     Ventricles   ----------------------------------------------------------------------   Name                                 Value        Normal   ----------------------------------------------------------------------     LV Dimensions 2D/MM   ----------------------------------------------------------------------   IVS Diastolic Thickness (2D)       1.50 cm        <=1.04   LVID Diastole (2D)                 4.70 cm     0.07-9.91   LVPW Diastolic Thickness   (2D)                               1.50 cm        <=1.04   LVID Systole (2D)                  3.20 cm     4.38-6.73   LVID Diastolic Index (2D)       2.69 cm/m2     2.20-3.00   LV Mass (2D Cubed)                294.05 g  88..00   LV Mass Index (2D Cubed)        0.02 g/cm2     0.00-0.01   Relative Wall Thickness (2D)          0.64                   LV Fractional Shortening/Ejection Fraction 2D/MM   ----------------------------------------------------------------------   LV Diastolic Length (4C)           6.90 cm                 LV Systolic Length (4C)            6.05 cm                 LV Stroke Volume (4C MOD) 39.40 ml                   RV Dimensions 2D/MM   ----------------------------------------------------------------------   RV Basal Diastolic Dimension       1.56 cm     3.80-2.38   RV Diastolic Area (4C)           43.70 cm2   85.23-92.63   RV Systolic Area (4C)            34.10 cm2    3.00-15.00   TAPSE                               2.2 cm         >=1.7     RV Fractional Shortening 2D   ----------------------------------------------------------------------   RV FAC (4C)                           22 %          >=35     Atria   ----------------------------------------------------------------------   Name                                 Value        Normal   ----------------------------------------------------------------------     LA Dimensions   ----------------------------------------------------------------------   LA Dimension (2D)                  5.60 cm     3.00-4.10   LA Dimen Index (2D)             3.21 cm/m2                 LA Volume Index (BP A-L)       37.84 ml/m2        <35.00   LA Volume Index (BP MOD)       38.00 ml/m2   16.00-34.00     RA Dimensions   ----------------------------------------------------------------------   RA Diastolic Major Axis   Length (4C)                        7.19 cm                 RA Diastolic Major Axis   Length Index (4C)                     0.41                 RA Area (4C)                     33.00 cm2        <21.00   RA ESV (4C MOD)                  116.00 ml   18.00-32.00   RA ESV Index (4C MOD)          66.50 ml/m2       <=32.00       Report Signatures   Finalized by Nimisha Roberts MD on 4/30/2021 12:00 PM       Technical Quality:     Fair     Staff   Ordering Provider:     Franklyn Walls A     Complete two-dimensional, color flow and Doppler transthoracic echocardiogram   is performed.      [id number]     Sonographer:     Tai Soto [facility]     Date: 04/30/21   Received From: [address]         CT Results (most recent):  Results from [facility] encounter on 10/07/21    CT CHEST WO CONT    Narrative  CT CHEST WITHOUT ENHANCEMENT    INDICATION: Pulmonary nodule. TECHNIQUE: Axial images obtained from the thoracic inlet to the level of the  diaphragm without intravenous contrast. Coronal and sagittal reformatted images  were obtained. All CT scans are performed using dose optimization techniques as  appropriate to the performed exam including the following: Automated exposure  control, adjustment of mA and/or kV according to patient size, and use of  iterative reconstructive technique. COMPARISON: None. CHEST FINDINGS:  The evaluation of the mediastinum, hilum and vascular structures is limited in  the absence of intravenous contrast.    Lungs: Minimal linear atelectasis or scar in the bilateral lung base. Rounded  nodular opacity at the right middle lobe anterior hilum measuring 15 x 18 mm on  image 32, more conspicuous than other hilar enlarged vascular structures. Assessment of the hilum is limited without IV contrast. Otherwise no lung mass. No acute findings. Pleura: No effusion. Lymph Nodes: No lymphadenopathy in the mediastinum or axilla. Cardiovascular: No aortic aneurysm. Enlarged main pulmonary artery measuring  3.5-4 cm. Global cardiomegaly with minimal pericardial effusion. Thyroid: Unremarkable in its visualized aspects. Bones/soft tissues: Mild thoracic vertebral osteophytes and other degenerative  changes. Upper Abdomen: 2.3 cm, 12 Hounsfield unit right upper pole renal cyst. 3.4 x 2.7  cm left adrenal mass contains macroscopic fat. Impression  1. Right middle lobe 1.8 cm perihilar lung nodule, hilar lymph node, versus  enlarged hilar vessel. Limited assessment and differentiation without IV  contrast. Enhanced CT chest exam is recommended. 2. Enlarged main pulmonary artery may indicate pulmonary arterial hypertension. 3. Moderate global cardiomegaly. 4. Left adrenal myelolipoma.   5. Right renal cyst.      PFT: mild obstruction and restriction. Reduced DLCO     ASSESSMENT and PLAN  Encounter Diagnoses   Name Primary? Lung nodule Yes    COPD, mild ([address]     Pulmonary hypertension (HCC)     Hilar adenopathy     Hypoxemia requiring supplemental oxygen        Pt with chronic hypoxemia and pulmonary hypertension likely Grp 2/3 due to chronic heart failure and COPD. Pt on supplemental O2 via POC, stressed importance of adherence bran given pulmonary hypertension. Continue Anoro and prn Albuterol with spacer, see orders. Continue rest of medications as listed. Schedule CT chest with contrast ASAP to image nodule vs adenopathy. Will call pt with results. Called pt's sister to discuss findings and plan. Left  for call back. RTC 6 months.

## 2022-08-12 ENCOUNTER — OFFICE VISIT (OUTPATIENT)
Dept: INTERNAL MEDICINE CLINIC | Age: 82
End: 2022-08-12
Payer: MEDICARE

## 2022-08-12 VITALS
TEMPERATURE: 97 F | RESPIRATION RATE: 17 BRPM | OXYGEN SATURATION: 94 % | BODY MASS INDEX: 25.95 KG/M2 | HEART RATE: 62 BPM | HEIGHT: 62 IN | WEIGHT: 141 LBS | SYSTOLIC BLOOD PRESSURE: 147 MMHG | DIASTOLIC BLOOD PRESSURE: 83 MMHG

## 2022-08-12 DIAGNOSIS — R63.4 WEIGHT LOSS: ICD-10-CM

## 2022-08-12 DIAGNOSIS — Z79.899 MEDICATION MANAGEMENT: ICD-10-CM

## 2022-08-12 DIAGNOSIS — E11.22 TYPE 2 DIABETES MELLITUS WITH CHRONIC KIDNEY DISEASE, WITHOUT LONG-TERM CURRENT USE OF INSULIN, UNSPECIFIED CKD STAGE (HCC): Primary | ICD-10-CM

## 2022-08-12 DIAGNOSIS — E87.6 HYPOKALEMIA: ICD-10-CM

## 2022-08-12 DIAGNOSIS — I10 ESSENTIAL HYPERTENSION: ICD-10-CM

## 2022-08-12 PROCEDURE — 3044F HG A1C LEVEL LT 7.0%: CPT | Performed by: INTERNAL MEDICINE

## 2022-08-12 PROCEDURE — 99214 OFFICE O/P EST MOD 30 MIN: CPT | Performed by: INTERNAL MEDICINE

## 2022-08-12 PROCEDURE — G8417 CALC BMI ABV UP PARAM F/U: HCPCS | Performed by: INTERNAL MEDICINE

## 2022-08-12 PROCEDURE — 1101F PT FALLS ASSESS-DOCD LE1/YR: CPT | Performed by: INTERNAL MEDICINE

## 2022-08-12 PROCEDURE — G8754 DIAS BP LESS 90: HCPCS | Performed by: INTERNAL MEDICINE

## 2022-08-12 PROCEDURE — G8753 SYS BP > OR = 140: HCPCS | Performed by: INTERNAL MEDICINE

## 2022-08-12 PROCEDURE — G8510 SCR DEP NEG, NO PLAN REQD: HCPCS | Performed by: INTERNAL MEDICINE

## 2022-08-12 PROCEDURE — G8427 DOCREV CUR MEDS BY ELIG CLIN: HCPCS | Performed by: INTERNAL MEDICINE

## 2022-08-12 PROCEDURE — 1123F ACP DISCUSS/DSCN MKR DOCD: CPT | Performed by: INTERNAL MEDICINE

## 2022-08-12 PROCEDURE — G8536 NO DOC ELDER MAL SCRN: HCPCS | Performed by: INTERNAL MEDICINE

## 2022-08-12 RX ORDER — LANOLIN ALCOHOL/MO/W.PET/CERES
400 CREAM (GRAM) TOPICAL DAILY
Qty: 90 TABLET | Refills: 1 | Status: SHIPPED | OUTPATIENT
Start: 2022-08-12

## 2022-08-12 NOTE — PROGRESS NOTES
1. \"Have you been to the ER, urgent care clinic since your last visit? Hospitalized since your last visit? \" No    2. \"Have you seen or consulted any other health care providers outside of the [address] [place] [address] since your last visit? \" Yes Dr. Wing Perales      3. For patients aged 39-70: Has the patient had a colonoscopy / FIT/ Cologuard? NA - based on age      If the patient is female:    4. For patients aged 41-77: Has the patient had a mammogram within the past 2 years? NA - based on age or sex      11. For patients aged 21-65: Has the patient had a pap smear? NA - based on age or sex      This patient is accompanied in the office by his sister.

## 2022-08-12 NOTE — PROGRESS NOTES
HISTORY OF PRESENT ILLNESS  Mike Kessler is a 80 y.o. male. BP (!) 147/83 (BP 1 Location: Left upper arm, BP Patient Position: Sitting, BP Cuff Size: Small adult) Comment: w/ BP meds 8:45am  Pulse 62   Temp 97 °F (36.1 °C) (Temporal)   Resp 17   Ht 5' 2\" (1.575 m)   Wt 141 lb (64 kg)   SpO2 94% Comment: 3L/min  BMI 25.79 kg/m²   Has lost 5 #    Recently saw Dr. Bartolome Soler  Has upcoming f/u with cardiology, Dr. Garland Kapadia, in Sept            Current Outpatient Medications:   ·  magnesium oxide (MAG-OX) 400 mg tablet, Take 1 Tablet by mouth in the morning. Indications: low amount of magnesium in the blood, Disp: 90 Tablet, Rfl: 1  ·  potassium chloride SR (KLOR-CON 10) 10 mEq tablet, Take 1 Tablet by mouth daily. , Disp: 90 Tablet, Rfl: 3  ·  finasteride (PROSCAR) 5 mg tablet, TAKE 1 TABLET BY MOUTH EVERY DAY, Disp: 90 Tablet, Rfl: 1  ·  albuterol (PROVENTIL HFA, VENTOLIN HFA, PROAIR HFA) 90 mcg/actuation inhaler, TAKE 2 PUFFS BY INHALATION EVERY SIX HOURS AS NEEDED FOR WHEEZING OR SHORTNESS OF BREATH., Disp: 8.5 Each, Rfl: 5  ·  tamsulosin (FLOMAX) 0.4 mg capsule, TAKE 1 CAPSULE BY MOUTH EVERY DAY, Disp: 90 Capsule, Rfl: 1  ·  carvediloL (COREG) 12.5 mg tablet, TAKE 1 TABLET BY MOUTH TWICE A DAY, Disp: 180 Tablet, Rfl: 3  ·  isosorbide mononitrate ER (IMDUR) 30 mg tablet, TAKE 1 TABLET BY MOUTH EVERY DAY. PLEASE SCHEDULE FOLLOW UP WITH DOCTOR  Indications: prevention of anginal chest pain associated with coronary artery disease, Disp: 90 Tablet, Rfl: 3  ·  furosemide (LASIX) 20 mg tablet, TAKE 1 TABLET BY MOUTH EVERY DAY, Disp: 90 Tablet, Rfl: 1  ·  atorvastatin (LIPITOR) 40 mg tablet, TAKE 1 TABLET BY MOUTH EVERY DAY, Disp: 90 Tablet, Rfl: 4  ·  amLODIPine (NORVASC) 10 mg tablet, TAKE 1 TABLET BY MOUTH DAILY, Disp: 90 Tablet, Rfl: 3  ·  amiodarone (CORDARONE) 200 mg tablet, Take 0.5 Tablets by mouth daily.  Indications: prevention of recurrent atrial fibrillation, Disp: 90 Tablet, Rfl: 3  · umeclidinium-vilanteroL (Anoro Ellipta) 62.5-25 mcg/actuation inhaler, Take 1 Puff by inhalation daily. , Disp: 3 Each, Rfl: 1  ·  aspirin delayed-release 81 mg tablet, Take 1 Tablet by mouth daily. , Disp: 90 Tablet, Rfl: 3  ·  inhalational spacing device, Use with Albuterol inhaler, Disp: 1 Each, Rfl: 3  ·  Oxygen, 3LPM continuous when walking . 2LPM when sitting, Disp: , Rfl:   ·  OLANZapine (ZyPREXA) 5 mg tablet, Take 5 mg by mouth nightly., Disp: , Rfl:           Hypertension   The history is provided by the Patient. This is a chronic problem. The current episode started more than 1 week ago. The problem has not changed since onset. Associated symptoms include malaise/fatigue. Pertinent negatives include no chest pain, no palpitations and no shortness of breath. Diabetes  Pertinent negatives include no chest pain and no shortness of breath. Cholesterol Problem  Pertinent negatives include no chest pain and no shortness of breath. Review of Systems   Constitutional:  Positive for malaise/fatigue. Negative for chills, diaphoresis and fever. Respiratory:  Negative for shortness of breath. Cardiovascular:  Negative for chest pain and palpitations. Gastrointestinal: Negative. Genitourinary:  Negative for frequency. Endo/Heme/Allergies:  Negative for polydipsia. Physical Exam  Vitals and nursing note reviewed. Constitutional:       General: He is not in acute distress. Appearance: Normal appearance. He is well-developed. He is not diaphoretic. HENT:      Head: Normocephalic and atraumatic. Cardiovascular:      Rate and Rhythm: Normal rate and regular rhythm. Pulmonary:      Effort: Pulmonary effort is normal.      Breath sounds: Normal breath sounds. Musculoskeletal:      Right lower leg: No edema. Left lower leg: No edema. Skin:     General: Skin is warm and dry. Neurological:      Mental Status: He is alert and oriented to person, place, and time.    Psychiatric: Mood and Affect: Mood normal.         Behavior: Behavior normal.       ASSESSMENT and PLAN    ICD-10-CM ICD-9-CM    1. Type 2 diabetes mellitus with chronic kidney disease, without long-term current use of insulin, unspecified CKD stage (HCC)  E00.40 890.96 METABOLIC PANEL, COMPREHENSIVE     585.9 LIPID PANEL      HEMOGLOBIN A1C W/O EAG      2. Essential hypertension  C65 106.0 METABOLIC PANEL, COMPREHENSIVE      LIPID PANEL      3. Hypokalemia  E87.6 276.8 magnesium oxide (MAG-OX) 400 mg tablet      4. Medication management  Z79.899 V58.69 magnesium oxide (MAG-OX) 400 mg tablet      5. Weight loss  R63.4 783.21 TSH AND FREE T4          BP controlled  DM has been controlled    Weight loss--check thyroid. May be dietary. But pt states he eats well. Dr. Franne Boeck note reviewed  Scheduling number provided to sister for her to call for his CT to f/u on pulmonary nodule. There has been some communication problem and coordination problem getting this scheduled.  Says she will follow through    Update lab    Refill as noted  F/u 4 months for MWV, HTN, DM, chol

## 2022-08-13 LAB
ALBUMIN SERPL-MCNC: 4.4 G/DL (ref 3.6–4.6)
ALBUMIN/GLOB SERPL: 1.7 {RATIO} (ref 1.2–2.2)
ALP SERPL-CCNC: 123 IU/L (ref 44–121)
ALT SERPL-CCNC: 29 IU/L (ref 0–44)
AST SERPL-CCNC: 25 IU/L (ref 0–40)
BILIRUB SERPL-MCNC: 1.1 MG/DL (ref 0–1.2)
BUN SERPL-MCNC: 16 MG/DL (ref 8–27)
BUN/CREAT SERPL: 14 (ref 10–24)
CALCIUM SERPL-MCNC: 9.3 MG/DL (ref 8.6–10.2)
CHLORIDE SERPL-SCNC: 97 MMOL/L (ref 96–106)
CHOLEST SERPL-MCNC: 95 MG/DL (ref 100–199)
CO2 SERPL-SCNC: 21 MMOL/L (ref 20–29)
CREAT SERPL-MCNC: 1.13 MG/DL (ref 0.76–1.27)
EGFR: 65 ML/MIN/1.73
GLOBULIN SER CALC-MCNC: 2.6 G/DL (ref 1.5–4.5)
GLUCOSE SERPL-MCNC: 120 MG/DL (ref 65–99)
HBA1C MFR BLD: 6.1 % (ref 4.8–5.6)
HDLC SERPL-MCNC: 45 MG/DL
IMP & REVIEW OF LAB RESULTS: NORMAL
LDLC SERPL CALC-MCNC: 39 MG/DL (ref 0–99)
POTASSIUM SERPL-SCNC: 4.4 MMOL/L (ref 3.5–5.2)
PROT SERPL-MCNC: 7 G/DL (ref 6–8.5)
SODIUM SERPL-SCNC: 131 MMOL/L (ref 134–144)
T4 FREE SERPL-MCNC: 1.84 NG/DL (ref 0.82–1.77)
TRIGL SERPL-MCNC: 42 MG/DL (ref 0–149)
TSH SERPL DL<=0.005 MIU/L-ACNC: 3.47 UIU/ML (ref 0.45–4.5)
VLDLC SERPL CALC-MCNC: 11 MG/DL (ref 5–40)

## 2022-09-02 DIAGNOSIS — Z76.0 MEDICATION REFILL: ICD-10-CM

## 2022-09-02 RX ORDER — FINASTERIDE 5 MG/1
TABLET, FILM COATED ORAL
Qty: 90 TABLET | Refills: 1 | Status: SHIPPED | OUTPATIENT
Start: 2022-09-02 | End: 2022-10-05 | Stop reason: ALTCHOICE

## 2022-09-02 RX ORDER — FUROSEMIDE 20 MG/1
TABLET ORAL
Qty: 90 TABLET | Refills: 1 | Status: SHIPPED | OUTPATIENT
Start: 2022-09-02 | End: 2022-10-05 | Stop reason: ALTCHOICE

## 2022-09-02 RX ORDER — TAMSULOSIN HYDROCHLORIDE 0.4 MG/1
CAPSULE ORAL
Qty: 90 CAPSULE | Refills: 1 | Status: SHIPPED | OUTPATIENT
Start: 2022-09-02

## 2022-09-16 RX ORDER — OLMESARTAN MEDOXOMIL 40 MG/1
TABLET ORAL
Qty: 90 TABLET | Refills: 1 | Status: SHIPPED | OUTPATIENT
Start: 2022-09-16

## 2022-09-21 ENCOUNTER — HOSPITAL ENCOUNTER (OUTPATIENT)
Dept: CT IMAGING | Age: 82
Discharge: HOME | End: 2022-09-21
Attending: INTERNAL MEDICINE
Payer: MEDICARE

## 2022-09-21 DIAGNOSIS — R91.1 LUNG NODULE: ICD-10-CM

## 2022-09-21 DIAGNOSIS — R59.0 HILAR ADENOPATHY: ICD-10-CM

## 2022-09-21 LAB — CREAT UR-MCNC: 1.3 MG/DL (ref 0.6–1.3)

## 2022-09-21 PROCEDURE — 74011000636 HC RX REV CODE- 636: Performed by: INTERNAL MEDICINE

## 2022-09-21 PROCEDURE — 82565 ASSAY OF CREATININE: CPT

## 2022-09-21 PROCEDURE — 71260 CT THORAX DX C+: CPT

## 2022-09-21 RX ADMIN — IOPAMIDOL 75 ML: 612 INJECTION, SOLUTION INTRAVENOUS at 14:20

## 2022-10-05 ENCOUNTER — OFFICE VISIT (OUTPATIENT)
Dept: CARDIOLOGY CLINIC | Age: 82
End: 2022-10-05
Payer: MEDICARE

## 2022-10-05 VITALS
HEART RATE: 60 BPM | SYSTOLIC BLOOD PRESSURE: 136 MMHG | DIASTOLIC BLOOD PRESSURE: 70 MMHG | WEIGHT: 144.6 LBS | TEMPERATURE: 97.8 F | OXYGEN SATURATION: 94 % | BODY MASS INDEX: 26.45 KG/M2

## 2022-10-05 DIAGNOSIS — I10 ESSENTIAL HYPERTENSION: ICD-10-CM

## 2022-10-05 DIAGNOSIS — I25.10 CHRONIC CORONARY ARTERY DISEASE: ICD-10-CM

## 2022-10-05 DIAGNOSIS — I50.9 CONGESTIVE HEART FAILURE, UNSPECIFIED HF CHRONICITY, UNSPECIFIED HEART FAILURE TYPE (HCC): Primary | ICD-10-CM

## 2022-10-05 PROCEDURE — G8754 DIAS BP LESS 90: HCPCS | Performed by: INTERNAL MEDICINE

## 2022-10-05 PROCEDURE — G8536 NO DOC ELDER MAL SCRN: HCPCS | Performed by: INTERNAL MEDICINE

## 2022-10-05 PROCEDURE — G8417 CALC BMI ABV UP PARAM F/U: HCPCS | Performed by: INTERNAL MEDICINE

## 2022-10-05 PROCEDURE — 1123F ACP DISCUSS/DSCN MKR DOCD: CPT | Performed by: INTERNAL MEDICINE

## 2022-10-05 PROCEDURE — 99214 OFFICE O/P EST MOD 30 MIN: CPT | Performed by: INTERNAL MEDICINE

## 2022-10-05 PROCEDURE — G8427 DOCREV CUR MEDS BY ELIG CLIN: HCPCS | Performed by: INTERNAL MEDICINE

## 2022-10-05 PROCEDURE — 1101F PT FALLS ASSESS-DOCD LE1/YR: CPT | Performed by: INTERNAL MEDICINE

## 2022-10-05 PROCEDURE — G8510 SCR DEP NEG, NO PLAN REQD: HCPCS | Performed by: INTERNAL MEDICINE

## 2022-10-05 PROCEDURE — G8752 SYS BP LESS 140: HCPCS | Performed by: INTERNAL MEDICINE

## 2022-10-05 PROCEDURE — 93000 ELECTROCARDIOGRAM COMPLETE: CPT | Performed by: INTERNAL MEDICINE

## 2022-10-05 NOTE — PROGRESS NOTES
Identified pt with two pt identifiers(name and ). Reviewed record in preparation for visit and have obtained necessary documentation. Felicity Mayo presents today for   Chief Complaint   Patient presents with    Follow-up     4 month       Pt denies DIZZINESS, SOB, CHEST PAIN/ PRESSURE, FATIGUE/WEAKNESS, HEADACHES, SWELLING. Felicity Mayo preferred language for health care discussion is english/other. Personal Protective Equipment:   Personal Protective Equipment was used including: mask-surgical and hands-gloves. Patient was placed on no precaution(s). Patient was masked. Precautions:   Patient currently on None  Patient currently roomed with door closed. Is someone accompanying this pt? yes    Is the patient using any DME equipment during OV? Yes. Cane     Depression Screening:  3 most recent PHQ Screens 10/5/2022   PHQ Not Done -   Little interest or pleasure in doing things Not at all   Feeling down, depressed, irritable, or hopeless Not at all   Total Score PHQ 2 0       Learning Assessment:  Learning Assessment 2019   PRIMARY LEARNER Patient   HIGHEST LEVEL OF EDUCATION - PRIMARY LEARNER  -   BARRIERS PRIMARY LEARNER -   CO-LEARNER CAREGIVER -   PRIMARY LANGUAGE ENGLISH   LEARNER PREFERENCE PRIMARY DEMONSTRATION   ANSWERED BY pt   RELATIONSHIP SELF       Abuse Screening:  Abuse Screening Questionnaire 5/10/2018   Do you ever feel afraid of your partner? N   Are you in a relationship with someone who physically or mentally threatens you? N   Is it safe for you to go home? Y       Fall Risk  Fall Risk Assessment, last 12 mths 10/5/2022   Able to walk? Yes   Fall in past 12 months? 0   Do you feel unsteady? 0   Are you worried about falling 0   Number of falls in past 12 months -   Fall with injury? -       Pt currently taking Anticoagulant therapy? no  Pt currently taking Antiplatelet therapy? yes    Coordination of Care:  1.  Have you been to the ER, urgent care clinic since your last visit? Hospitalized since your last visit? no    2. Have you seen or consulted any other health care providers outside of the [address] [place] [address] since your last visit? Include any pap smears or colon screening. yes      Please see Red banners under Allergies and Med Rec to remove outside inquires. All correct information has been verified with patient and added to chart.      Medication's patient's would liked removed has been marked not taking to be removed per Verbal order and read back per Brice Waters MD

## 2022-10-15 NOTE — PROGRESS NOTES
Subjective:      Delilah Rodríguez is seen today for cardiac re-evaluation. He is a 80 y.o. man with multiple medical problems including coronary artery disease, cardiomyopathy with ejection fraction of 50%, pulmonic stenosis, prior history of endocarditis of the pulmonic valve, paroxysmal atrial fibrillation, pulmonary hypertension, schizophrenia, chronic Amiodarone therapy, hyperlipidemia, diabetes and hypertension. The patient was seen in early 2019 for follow up of  elevated LFTs. The patient is on Amiodarone therapy with a starting date which was not completely clear. He did require a cardioversion  in 2016 which was likely to have been the time he was started on Amiodarone. His Amiodarone was decreased to 100 mg daily at a prior visit. He is also on statin therapy. In the office today, he reports that he was taken off the Eliquis secondary to hematuria. He reports that he feels \"pretty good \". His breathing has been [place] \". He has had no chest pain. He has had no increasing swelling. He has had no palpitations.   Patient Active Problem List    Diagnosis Date Noted    Chronic hypoxemic respiratory failure ([address] 11/11/2021    Pulmonary hypertension due to left heart disease ([address] 11/11/2021    Chest pain 04/27/2021    Altered mental status 11/08/2019    Aptyalism 11/08/2019    Chronic obstructive pulmonary disease ([address] 11/08/2019    Enlarged prostate 11/08/2019    Fall 11/08/2019    Infective endocarditis 11/08/2019    Hypoxia 11/08/2019    Heart valve disease 11/08/2019    Gram-positive bacteremia 11/08/2019    Shortness of breath 11/08/2019    Sensory hearing loss 11/08/2019    Retention of urine 11/08/2019    Personal history of other medical treatment 11/08/2019    Palpitations 11/08/2019    Pulmonic stenosis 12/28/2018    Pulmonary hypertension (HCC)     LVH (left ventricular hypertrophy) 03/17/2018    On amiodarone therapy 03/17/2018    RBBB 03/17/2018    Paroxysmal atrial fibrillation ([address] 93/72/6199    Diastolic dysfunction 76/24/4051    Pneumonia due to infectious organism 03/08/2018    Lung nodule 03/08/2018    SIRS (systemic inflammatory response syndrome) (HCC) 02/08/2018    Elevated troponin 02/07/2018    Type 2 diabetes mellitus with chronic kidney disease ([facility] 02/03/2018    Type 2 diabetes mellitus without complication, without long-term current use of insulin ([facility] 01/25/2018    Hyperlipidemia 01/25/2018    Syncope and collapse 07/19/2017    Left adrenal mass ([facility] 06/25/2017    Acute bacterial endocarditis 09/28/2016    ENID (acute kidney injury) ([facility] 09/09/2016    Chronic systolic congestive heart failure ([facility] 02/08/2016    Thalassemia 01/27/2016    Gastroesophageal reflux disease with esophagitis 01/27/2016    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    Swelling of lower extremity 01/27/2016    Hypokalemia 01/27/2016    Visual hallucinations 01/27/2016    Essential hypertension 01/27/2016    Congestive heart failure ([facility] 01/27/2016    Chronic schizophrenia ([facility] 01/27/2016    Chronic kidney disease 01/27/2016    Gout 01/27/2016    Chronic coronary artery disease 01/27/2016    Hematuria 01/27/2016    Acute on chronic diastolic heart failure ([facility] 12/30/2015    Non-adherence to medical treatment 11/26/2015    GERD (gastroesophageal reflux disease) 04/23/2011    QT prolongation 04/23/2011    Refusal of blood transfusions as patient is Jain 03/17/2011     Current Outpatient Medications   Medication Sig Dispense Refill    olmesartan (BENICAR) 40 mg tablet TAKE 1 TABLET BY MOUTH EVERY DAY FOR BLOOD PRESSURE 90 Tablet 1    tamsulosin (FLOMAX) 0.4 mg capsule TAKE 1 CAPSULE BY MOUTH EVERY DAY 90 Capsule 1    magnesium oxide (MAG-OX) 400 mg tablet Take 1 Tablet by mouth in the morning. Indications: low amount of magnesium in the blood 90 Tablet 1    potassium chloride SR (KLOR-CON 10) 10 mEq tablet Take 1 Tablet by mouth daily.  90 Tablet 3    albuterol (PROVENTIL HFA, VENTOLIN HFA, PROAIR HFA) 90 mcg/actuation inhaler TAKE 2 PUFFS BY INHALATION EVERY SIX HOURS AS NEEDED FOR WHEEZING OR SHORTNESS OF BREATH. 8.5 Each 5    carvediloL (COREG) 12.5 mg tablet TAKE 1 TABLET BY MOUTH TWICE A  Tablet 3    isosorbide mononitrate ER (IMDUR) 30 mg tablet TAKE 1 TABLET BY MOUTH EVERY DAY. PLEASE SCHEDULE FOLLOW UP WITH DOCTOR  Indications: prevention of anginal chest pain associated with coronary artery disease 90 Tablet 3    atorvastatin (LIPITOR) 40 mg tablet TAKE 1 TABLET BY MOUTH EVERY DAY 90 Tablet 4    amLODIPine (NORVASC) 10 mg tablet TAKE 1 TABLET BY MOUTH DAILY 90 Tablet 3    amiodarone (CORDARONE) 200 mg tablet Take 0.5 Tablets by mouth daily. Indications: prevention of recurrent atrial fibrillation 90 Tablet 3    umeclidinium-vilanteroL (Anoro Ellipta) 62.5-25 mcg/actuation inhaler Take 1 Puff by inhalation daily. 3 Each 1    aspirin delayed-release 81 mg tablet Take 1 Tablet by mouth daily. 90 Tablet 3    inhalational spacing device Use with Albuterol inhaler 1 Each 3    Oxygen 3LPM continuous when walking . 2LPM when sitting      OLANZapine (ZyPREXA) 5 mg tablet Take 5 mg by mouth nightly. No Known Allergies  Past Medical History:   Diagnosis Date    Adrenal myelolipoma     Benign non-nodular prostatic hyperplasia with lower urinary tract symptoms     Beta thalassemia trait     Blepharitis 05/04/2022    CAD (coronary artery disease)     Cardiomyopathy (HCC)     EF 30%    Diabetes mellitus ([address]     Diabetic nephropathy (HCC)     Diverticulosis     Elevated PSA     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  Faecalis    Enlarged prostate     GERD (gastroesophageal reflux disease)     Glaucoma suspect of both eyes 05/04/2022    Gout     Heart failure (HCC)     Hematuria     Hyperlipidemia     Hypertension     Hypertensive retinopathy of both eyes 05/04/2022    mild    Incomplete emptying of bladder     Keratoconjunctivitis of both eyes 05/04/2022    Lacunar infarction ([facility]     left basal ganglia, right cerebellar    Other ill-defined conditions(799.89)     gout    PAF (paroxysmal atrial fibrillation) ([facility]     Pulmonary hypertension (HCC)     Rectal polyp     Right renal mass     Schizophrenia ([facility]     Senile nuclear cataract 05/04/2022    Stopped smoking with greater than 10 pack year history     Vitamin D deficiency     Vitreous floaters, bilateral 05/04/2022    Weight loss, unintentional      Past Surgical History:   Procedure Laterality Date    HX COLONOSCOPY  04/17/2013    Dr. Anne Hooker    HX ENDOSCOPY      HX ORTHOPAEDIC  6870'L    wound repair L. arm    HX TURP  03/01/2017     TRANSURETHRAL RESECTION OF PROSTATE;  Surgeon: Rasheeda Escalera MD; [facility]     Family History   Problem Relation Age of Onset    Hypertension Mother     Heart Disease Mother     Hypertension Father     Hypertension Brother      Social History     Tobacco Use   Smoking Status Former    Packs/day: 0.50    Years: 30.00    Pack years: 15.00    Types: Cigarettes   Smokeless Tobacco Never          Review of Systems, additional:  Constitutional: negative  Eyes: negative  Respiratory: positive for dyspnea on exertion - improving  Cardiovascular: positive for dyspnea on exertion - improving  Gastrointestinal: negative  Musculoskeletal:negative  Neurological: negative  Behvioral/Psych: negative  Endocrine: negative  ENT: negative    Objective:     Visit Vitals  /70   Pulse 60   Temp 97.8 °F (36.6 °C) (Temporal)   Wt 65.6 kg (144 lb 9.6 oz)   SpO2 94% Comment: pt on 3 liters of oxygen   BMI 26.45 kg/m²     General:  alert, cooperative, no distress   Chest Wall: inspection normal - no chest wall deformities or tenderness, respiratory effort normal   Lung: clear to auscultation bilaterally   Heart:  normal rate and regular rhythm, S1 and S2 normal, systolic murmur 2/6 at the left second intercostal space border   Abdomen: soft, non-tender.  Bowel sounds normal. No masses,  no organomegaly Extremities: 1+ pitting edema to lower extremities Skin: no rashes   Neuro: alert, oriented, normal speech, no focal findings or movement disorder noted     10/5/2022. Sinus rhythm with first-degree AV block. Right bundle branch block. Left posterior fascicular block. Diffuse nondiagnostic ST and T wave normalities    Assessment/Plan:         ICD-10-CM ICD-9-CM    1. LVH (left ventricular hypertrophy), moderate by recent echo 02/07/2018. Other findings including severely dilated RV and pulmonary hypertension, moderate pulmonic stenosis with last transvalvular velocity in 2016, 3.2 m/s. He was supposed to have a repeat echocardiogram for the last 2 visits. Will reorder. I51.7 429.3    2. Hyperlipidemia, unspecified hyperlipidemia type 8/12/2022. Total cholesterol 95. Triglycerides 42. HDL 5 45 LDL 39 the patient is is taking atorvastatin 40 mg daily. E78.5 272.4    3. Chronic coronary artery disease, nuclear stress test done 02/2016; small apical lateral scar with no significant ischemia. I25.10 414.00    4. Congestive heart failure, unspecified congestive heart failure chronicity, unspecified congestive heart failure type ([facility] stable symptoms, reports he has not been taking furosemide I50.9 428.0    5. Essential hypertension, controlled   BP in office today. I10 401.9    6. Type 2 diabetes mellitus with nephropathy (HCC) E11.21 250.40      583.81    7. Chronic kidney disease, unspecified CKD stage N18.9 585.9    8. Chronic schizophrenia ([facility] F20.9 295.62    9. On amiodarone therapy, taking 100 mg daily. Total bilirubin 1.5. AST and ALT WNL. Z79.899 V58.69    10. Paroxysmal atrial fibrillation ([facility] S/P cardioversion 1/4/2016, on amiodarone 100 mg daily I48.0 427.31    11   Pulmonic stenosis, will order echocardiogram.  Return in 6 months.

## 2022-11-09 ENCOUNTER — TELEPHONE (OUTPATIENT)
Dept: CARDIOLOGY CLINIC | Age: 82
End: 2022-11-09

## 2022-12-12 ENCOUNTER — HOSPITAL ENCOUNTER (OUTPATIENT)
Dept: LAB | Age: 82
Discharge: HOME | End: 2022-12-12

## 2022-12-12 LAB — XX-LABCORP SPECIMEN COL,LCBCF: NORMAL

## 2022-12-12 PROCEDURE — 99001 SPECIMEN HANDLING PT-LAB: CPT

## 2022-12-13 ENCOUNTER — OFFICE VISIT (OUTPATIENT)
Dept: INTERNAL MEDICINE CLINIC | Age: 82
End: 2022-12-13
Payer: MEDICARE

## 2022-12-13 VITALS
WEIGHT: 140 LBS | OXYGEN SATURATION: 97 % | HEART RATE: 49 BPM | TEMPERATURE: 97.3 F | BODY MASS INDEX: 25.76 KG/M2 | RESPIRATION RATE: 18 BRPM | SYSTOLIC BLOOD PRESSURE: 129 MMHG | HEIGHT: 62 IN | DIASTOLIC BLOOD PRESSURE: 75 MMHG

## 2022-12-13 DIAGNOSIS — Z99.81 OXYGEN DEPENDENT: ICD-10-CM

## 2022-12-13 DIAGNOSIS — J44.9 CHRONIC OBSTRUCTIVE PULMONARY DISEASE, UNSPECIFIED COPD TYPE (HCC): ICD-10-CM

## 2022-12-13 DIAGNOSIS — F20.9 CHRONIC SCHIZOPHRENIA (HCC): ICD-10-CM

## 2022-12-13 DIAGNOSIS — E11.22 TYPE 2 DIABETES MELLITUS WITH CHRONIC KIDNEY DISEASE, WITHOUT LONG-TERM CURRENT USE OF INSULIN, UNSPECIFIED CKD STAGE (HCC): ICD-10-CM

## 2022-12-13 DIAGNOSIS — Z00.00 MEDICARE ANNUAL WELLNESS VISIT, SUBSEQUENT: Primary | ICD-10-CM

## 2022-12-13 DIAGNOSIS — R41.89 COGNITIVE DEFICITS: ICD-10-CM

## 2022-12-13 DIAGNOSIS — I10 ESSENTIAL HYPERTENSION: ICD-10-CM

## 2022-12-13 PROCEDURE — 3078F DIAST BP <80 MM HG: CPT | Performed by: INTERNAL MEDICINE

## 2022-12-13 PROCEDURE — 1123F ACP DISCUSS/DSCN MKR DOCD: CPT | Performed by: INTERNAL MEDICINE

## 2022-12-13 PROCEDURE — G8417 CALC BMI ABV UP PARAM F/U: HCPCS | Performed by: INTERNAL MEDICINE

## 2022-12-13 PROCEDURE — G0439 PPPS, SUBSEQ VISIT: HCPCS | Performed by: INTERNAL MEDICINE

## 2022-12-13 PROCEDURE — G8427 DOCREV CUR MEDS BY ELIG CLIN: HCPCS | Performed by: INTERNAL MEDICINE

## 2022-12-13 PROCEDURE — G8510 SCR DEP NEG, NO PLAN REQD: HCPCS | Performed by: INTERNAL MEDICINE

## 2022-12-13 PROCEDURE — 3044F HG A1C LEVEL LT 7.0%: CPT | Performed by: INTERNAL MEDICINE

## 2022-12-13 PROCEDURE — 3074F SYST BP LT 130 MM HG: CPT | Performed by: INTERNAL MEDICINE

## 2022-12-13 PROCEDURE — G8536 NO DOC ELDER MAL SCRN: HCPCS | Performed by: INTERNAL MEDICINE

## 2022-12-13 PROCEDURE — 1101F PT FALLS ASSESS-DOCD LE1/YR: CPT | Performed by: INTERNAL MEDICINE

## 2022-12-13 PROCEDURE — G8752 SYS BP LESS 140: HCPCS | Performed by: INTERNAL MEDICINE

## 2022-12-13 PROCEDURE — G8754 DIAS BP LESS 90: HCPCS | Performed by: INTERNAL MEDICINE

## 2022-12-13 PROCEDURE — 99214 OFFICE O/P EST MOD 30 MIN: CPT | Performed by: INTERNAL MEDICINE

## 2022-12-13 NOTE — PATIENT INSTRUCTIONS
Medicare Wellness Visit, Male    The best way to live healthy is to have a lifestyle where you eat a well-balanced diet, exercise regularly, limit alcohol use, and quit all forms of tobacco/nicotine, if applicable. Regular preventive services are another way to keep healthy. Preventive services (vaccines, screening tests, monitoring & exams) can help personalize your care plan, which helps you manage your own care. Screening tests can find health problems at the earliest stages, when they are easiest to treat. [facility] follows the current, evidence-based guidelines published by the [facility]inny Felisha ([facility]) when recommending preventive services for our patients. Because we follow these guidelines, sometimes recommendations change over time as research supports it. (For example, a prostate screening blood test is no longer routinely recommended for men with no symptoms). Of course, you and your doctor may decide to screen more often for some diseases, based on your risk and co-morbidities (chronic disease you are already diagnosed with). Preventive services for you include:  - Medicare offers their members a free annual wellness visit, which is time for you and your primary care provider to discuss and plan for your preventive service needs.  Take advantage of this benefit every year!    -Over the age of 72 should receive the recommended pneumonia vaccines.   -All adults should have a flu vaccine yearly.  -All adults should receive a tetanus vaccine every 10 years.  -Over the age of 48 should receive the shingles vaccines.    -All adults should be screened once for Hepatitis C.  -All adults age 38-68 who are overweight should have a diabetes screening test once every three years.   -Other screening tests & preventive services for persons with diabetes include: an eye exam to screen for diabetic retinopathy, a kidney function test, a foot exam, and stricter control over your cholesterol.   -Cardiovascular screening for adults with routine risk involves an electrocardiogram (ECG) at intervals determined by the provider.     -Colorectal cancer screening should be done for adults age 43-69 with no increased risk factors for colorectal cancer. There are a number of acceptable methods of screening for this type of cancer. Each test has its own benefits and drawbacks. Discuss with your provider what is most appropriate for you during your annual wellness visit. The different tests include: colonoscopy (considered the best screening method), a fecal occult blood test, a fecal DNA test, and sigmoidoscopy.    -Lung cancer screening is recommended annually with a low dose CT scan for adults between age 54 and 68, who have smoked at least 30 pack years (equivalent of 1 pack per day for 30 days), and who is a current smoker or quit less than 15 years ago. -An Abdominal Aortic Aneurysm (AAA) Screening is recommended for men age 73-68 who has ever smoked in their lifetime.      Here is a list of your current Health Maintenance items (your personalized list of preventive services) with a due date:  Health Maintenance Due   Topic Date Due    Eye Exam  Never done    COVID-19 Vaccine (5 - Booster for Moderna series) 08/18/2022    Albumin Urine Test  10/20/2022

## 2022-12-13 NOTE — PROGRESS NOTES
HISTORY OF PRESENT ILLNESS  Carlos Klein is a 80 y.o. male. /75 (BP 1 Location: Left upper arm, BP Patient Position: Sitting, BP Cuff Size: Small adult)   Pulse (!) 49   Temp 97.3 °F (36.3 °C) (Temporal)   Resp 18   Ht 5' 2\" (1.575 m)   Wt 140 lb (63.5 kg)   SpO2 97% Comment: 2L/min  BMI 25.61 kg/m²     Hypertension   The history is provided by the Patient. This is a new problem. Associated symptoms include shortness of breath (baseline). Pertinent negatives include no chest pain and no palpitations. Diabetes  The history is provided by the Patient. This is a chronic problem. Associated symptoms include shortness of breath (baseline). Pertinent negatives include no chest pain. Review of Systems   Constitutional:  Negative for chills and fever. Respiratory:  Positive for shortness of breath (baseline). Cardiovascular:  Negative for chest pain and palpitations. Physical Exam  Vitals and nursing note reviewed. Constitutional:       General: He is not in acute distress. Appearance: Normal appearance. He is well-developed. He is not diaphoretic. HENT:      Head: Normocephalic and atraumatic. Cardiovascular:      Rate and Rhythm: Normal rate and regular rhythm. Pulmonary:      Effort: Pulmonary effort is normal.      Breath sounds: Normal breath sounds. Musculoskeletal:      Right lower leg: No edema. Left lower leg: No edema. Skin:     General: Skin is warm and dry. Neurological:      Mental Status: He is alert and oriented to person, place, and time.    Psychiatric:         Mood and Affect: Mood normal.         Behavior: Behavior normal.       ASSESSMENT and PLAN    ICD-10-CM ICD-9-CM              2. Type 2 diabetes mellitus with chronic kidney disease, without long-term current use of insulin, unspecified CKD stage (HCC)  E11.22 250.40 LIPID PANEL     585.9 HEMOGLOBIN A1C W/O EAG      METABOLIC PANEL, COMPREHENSIVE      METABOLIC PANEL, COMPREHENSIVE HEMOGLOBIN A1C W/O EAG      LIPID PANEL      3. Essential hypertension  I10 401.9 LIPID PANEL      METABOLIC PANEL, COMPREHENSIVE      METABOLIC PANEL, COMPREHENSIVE      LIPID PANEL      4. Chronic schizophrenia ([facility]  F20.9 295.62       5. Cognitive deficits  R41.89 294.9       6. Chronic obstructive pulmonary disease, unspecified COPD type ([facility]  J44.9 496       7. Oxygen dependent  Z99.81 V46.2         BP controlled  DM has been controlled  Sister kept asking about his oxygen. Advised her I do not manage his oxygen and she should talk to the pulmonologist  Sister kept asking about his meds. She thinks he is on too many medications. Attempted to educate her but she has poor health and medical literacy.   Continue same meds  F/u 6 months for DM, HTN

## 2022-12-13 NOTE — PROGRESS NOTES
1. \"Have you been to the ER, urgent care clinic since your last visit? Hospitalized since your last visit? \" No    2. \"Have you seen or consulted any other health care providers outside of the [address] [place] [address] since your last visit? \" Yes Psych      3. For patients aged 39-70: Has the patient had a colonoscopy / FIT/ Cologuard? NA - based on age      If the patient is female:    4. For patients aged 41-77: Has the patient had a mammogram within the past 2 years? NA - based on age or sex      11. For patients aged 21-65: Has the patient had a pap smear? NA - based on age or sex      This patient is accompanied in the office by his sister.

## 2022-12-13 NOTE — PROGRESS NOTES
This is the Subsequent Medicare Annual Wellness Exam, performed 12 months or more after the Initial AWV or the last Subsequent AWV    I have reviewed the patient's medical history in detail and updated the computerized patient record. /75 (BP 1 Location: Left upper arm, BP Patient Position: Sitting, BP Cuff Size: Small adult)   Pulse (!) 49   Temp 97.3 °F (36.3 °C) (Temporal)   Resp 18   Ht 5' 2\" (1.575 m)   Wt 140 lb (63.5 kg)   SpO2 97% Comment: 2L/min  BMI 25.61 kg/m²       Assessment/Plan   Education and counseling provided:  Are appropriate based on today's review and evaluation    1. Medicare annual wellness visit, subsequent     Depression Risk Factor Screening     3 most recent PHQ Screens 12/13/2022   PHQ Not Done Functional capacity motivation limits accuracy   Little interest or pleasure in doing things Not at all   Feeling down, depressed, irritable, or hopeless Not at all   Total Score PHQ 2 0       Alcohol & Drug Abuse Risk Screen    Do you average more than 1 drink per night or more than 7 drinks a week: No    In the past three months have you have had more than 4 drinks containing alcohol on one occasion: No          Functional Ability and Level of Safety    Hearing:  supposed to wear hearing aides but doesn't      Activities of Daily Living: The home contains: grab bars and medic alert necklace  Patient needs help with:  transportation, shopping, laundry, and housework      Ambulation: with difficulty, uses a has oxygen tank. Had a recent fall/slip off his chair. Fall Risk:  Fall Risk Assessment, last 12 mths 12/13/2022   Able to walk? Yes   Fall in past 12 months? 1   Do you feel unsteady? 0   Are you worried about falling 0   Is TUG test greater than 12 seconds? 0   Is the gait abnormal? 1   Number of falls in past 12 months 1   Fall with injury?  1      Abuse Screen:  Patient is not abused       Cognitive Screening    Has your family/caregiver stated any concerns about your memory: yes - pt has known cognitive issues. Cognitive Screening: Abnormal - Animal Naming Test. Only 6 this year in a minute. Several more later with prompting.   Named 3 objects and their  uses    Health Maintenance Due     Health Maintenance Due   Topic Date Due    Eye Exam Retinal or Dilated  Never done    COVID-19 Vaccine (5 - Booster for Moderna series) 08/18/2022    MICROALBUMIN Q1  10/20/2022       Patient Care Team   Patient Care Team:  Hoa Arnold MD as PCP - General (Internal Medicine Physician)  Hoa Arnold MD as PCP - [facility]  Elida Carrizales (Physician Assistant)  Raquel Caal MD (Pulmonary Disease)  Valentino Liriano MD (Psychiatry)  Isa Velasquez MD (Ophthalmology)    History     Patient Active Problem List   Diagnosis Code    Thalassemia D56.9    Gastroesophageal reflux disease with esophagitis K21.00    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    Swelling of lower extremity M79.89    Hypokalemia E87.6    Visual hallucinations R44.1    Essential hypertension I10    Congestive heart failure ([address] I50.9    Chronic schizophrenia ([address] F20.9    Chronic kidney disease N18.9    Gout M10.9    Chronic coronary artery disease I25.10    Hematuria R31.9    Type 2 diabetes mellitus without complication, without long-term current use of insulin ([address] E11.9    Hyperlipidemia E78.5    Type 2 diabetes mellitus with chronic kidney disease ([address] E11.22    Pneumonia due to infectious organism J18.9    Lung nodule R91.1    LVH (left ventricular hypertrophy) I51.7    On amiodarone therapy Z79.899    RBBB I45.10    Paroxysmal atrial fibrillation (HCC) G97.9    Diastolic dysfunction X64.89    Pulmonary hypertension ([address] I27.20    Pulmonic stenosis I37.0    Acute bacterial endocarditis I33.0    Acute on chronic diastolic heart failure (HCC) I50.33    ENID (acute kidney injury) ([address] N17.9    Altered mental status R41.82    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 XXXA    Left adrenal mass (HCC) E27.8    Infective endocarditis I33.0    Hypoxia R09.02    Heart valve disease I38    Gram-positive bacteremia R78.81    GERD (gastroesophageal reflux disease) K21.9    Syncope and collapse R55    SIRS (systemic inflammatory response syndrome) ([facility]) R65.10    Shortness of breath R06.02    Sensory hearing loss H90.5    Retention of urine R33.9    Refusal of blood transfusions as patient is [employer] Z53.1    QT prolongation R94.31    Personal history of other medical treatment Z92.89    Palpitations R00.2    Non-adherence to medical treatment Z91.199    Chest pain R07.9    Chronic hypoxemic respiratory failure ([facility]) J96.11    Pulmonary hypertension due to left heart disease ([facility]) I27.22     Past Medical History:   Diagnosis Date    Adrenal myelolipoma     Benign non-nodular prostatic hyperplasia with lower urinary tract symptoms     Beta thalassemia trait     Blepharitis 05/04/2022    CAD (coronary artery disease)     Cardiomyopathy ([facility])     EF 30%    Diabetes mellitus (HCC)     Diabetic nephropathy (HCC)     Diverticulosis     Elevated PSA     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 Thalia Douglas    HX ENDOSCOPY      HX ORTHOPAEDIC  [address]    wound repair L. arm    HX TURP  03/01/2017     TRANSURETHRAL RESECTION OF PROSTATE;  Surgeon: Pincus Cushing, MD; [facility]     Current Outpatient Medications   Medication Sig Dispense Refill    tamsulosin (FLOMAX) 0.4 mg capsule TAKE 1 CAPSULE BY MOUTH EVERY DAY 90 Capsule 1    magnesium oxide (MAG-OX) 400 mg tablet Take 1 Tablet by mouth in the morning. Indications: low amount of magnesium in the blood 90 Tablet 1    potassium chloride SR (KLOR-CON 10) 10 mEq tablet Take 1 Tablet by mouth daily. 90 Tablet 3    albuterol (PROVENTIL HFA, VENTOLIN HFA, PROAIR HFA) 90 mcg/actuation inhaler TAKE 2 PUFFS BY INHALATION EVERY SIX HOURS AS NEEDED FOR WHEEZING OR SHORTNESS OF BREATH. 8.5 Each 5    carvediloL (COREG) 12.5 mg tablet TAKE 1 TABLET BY MOUTH TWICE A  Tablet 3    isosorbide mononitrate ER (IMDUR) 30 mg tablet TAKE 1 TABLET BY MOUTH EVERY DAY. PLEASE SCHEDULE FOLLOW UP WITH DOCTOR  Indications: prevention of anginal chest pain associated with coronary artery disease 90 Tablet 3    atorvastatin (LIPITOR) 40 mg tablet TAKE 1 TABLET BY MOUTH EVERY DAY 90 Tablet 4    amLODIPine (NORVASC) 10 mg tablet TAKE 1 TABLET BY MOUTH DAILY 90 Tablet 3    amiodarone (CORDARONE) 200 mg tablet Take 0.5 Tablets by mouth daily. Indications: prevention of recurrent atrial fibrillation 90 Tablet 3    umeclidinium-vilanteroL (Anoro Ellipta) 62.5-25 mcg/actuation inhaler Take 1 Puff by inhalation daily. 3 Each 1    aspirin delayed-release 81 mg tablet Take 1 Tablet by mouth daily. 90 Tablet 3    inhalational spacing device Use with Albuterol inhaler 1 Each 3    Oxygen 3LPM continuous when walking .  2LPM when sitting      OLANZapine (ZyPREXA) 5 mg tablet Take 5 mg by mouth nightly.       olmesartan (BENICAR) 40 mg tablet TAKE 1 TABLET BY MOUTH EVERY DAY FOR BLOOD PRESSURE 90 Tablet 1     No Known Allergies    Family History   Problem Relation Age of Onset    Hypertension Mother     Heart Disease Mother     Hypertension Father     Hypertension Brother      Social History     Tobacco Use    Smoking status: Former     Packs/day: 0.50     Years: 30.00     Pack years: 15.00     Types: Cigarettes    Smokeless tobacco: Never   Substance Use Topics    Alcohol use: No         Colin Kellogg MD

## 2022-12-14 LAB
ALBUMIN SERPL-MCNC: 4.5 G/DL (ref 3.6–4.6)
ALBUMIN/GLOB SERPL: 2 {RATIO} (ref 1.2–2.2)
ALP SERPL-CCNC: 151 IU/L (ref 44–121)
ALT SERPL-CCNC: 39 IU/L (ref 0–44)
AST SERPL-CCNC: 28 IU/L (ref 0–40)
BILIRUB SERPL-MCNC: 1.3 MG/DL (ref 0–1.2)
BUN SERPL-MCNC: 17 MG/DL (ref 8–27)
BUN/CREAT SERPL: 14 (ref 10–24)
CALCIUM SERPL-MCNC: 8.7 MG/DL (ref 8.6–10.2)
CHLORIDE SERPL-SCNC: 96 MMOL/L (ref 96–106)
CHOLEST SERPL-MCNC: 99 MG/DL (ref 100–199)
CO2 SERPL-SCNC: 23 MMOL/L (ref 20–29)
CREAT SERPL-MCNC: 1.18 MG/DL (ref 0.76–1.27)
EGFR: 62 ML/MIN/1.73
GLOBULIN SER CALC-MCNC: 2.2 G/DL (ref 1.5–4.5)
GLUCOSE SERPL-MCNC: 115 MG/DL (ref 70–99)
HBA1C MFR BLD: 5.7 % (ref 4.8–5.6)
HDLC SERPL-MCNC: 53 MG/DL
IMP & REVIEW OF LAB RESULTS: NORMAL
LDLC SERPL CALC-MCNC: 35 MG/DL (ref 0–99)
POTASSIUM SERPL-SCNC: 4 MMOL/L (ref 3.5–5.2)
PROT SERPL-MCNC: 6.7 G/DL (ref 6–8.5)
SODIUM SERPL-SCNC: 135 MMOL/L (ref 134–144)
TRIGL SERPL-MCNC: 37 MG/DL (ref 0–149)
VLDLC SERPL CALC-MCNC: 11 MG/DL (ref 5–40)

## 2023-02-13 DIAGNOSIS — I10 ESSENTIAL (PRIMARY) HYPERTENSION: ICD-10-CM

## 2023-02-13 DIAGNOSIS — Z76.0 ENCOUNTER FOR ISSUE OF REPEAT PRESCRIPTION: ICD-10-CM

## 2023-02-13 DIAGNOSIS — I25.10 ATHEROSCLEROTIC HEART DISEASE OF NATIVE CORONARY ARTERY WITHOUT ANGINA PECTORIS: ICD-10-CM

## 2023-02-13 RX ORDER — CARVEDILOL 12.5 MG/1
TABLET ORAL
Qty: 180 TABLET | Refills: 3 | Status: SHIPPED | OUTPATIENT
Start: 2023-02-13

## 2023-02-13 RX ORDER — FUROSEMIDE 20 MG/1
TABLET ORAL
Qty: 90 TABLET | Refills: 1 | Status: SHIPPED | OUTPATIENT
Start: 2023-02-13

## 2023-02-28 ENCOUNTER — OFFICE VISIT (OUTPATIENT)
Age: 83
End: 2023-02-28
Payer: MEDICARE

## 2023-02-28 VITALS
TEMPERATURE: 98.8 F | WEIGHT: 145.2 LBS | HEART RATE: 88 BPM | OXYGEN SATURATION: 93 % | HEIGHT: 62 IN | BODY MASS INDEX: 26.72 KG/M2 | RESPIRATION RATE: 16 BRPM | DIASTOLIC BLOOD PRESSURE: 70 MMHG | SYSTOLIC BLOOD PRESSURE: 124 MMHG

## 2023-02-28 DIAGNOSIS — R09.02 HYPOXEMIA REQUIRING SUPPLEMENTAL OXYGEN: ICD-10-CM

## 2023-02-28 DIAGNOSIS — Z87.891 FORMER SMOKER: ICD-10-CM

## 2023-02-28 DIAGNOSIS — J98.4 RESTRICTIVE LUNG DISEASE: ICD-10-CM

## 2023-02-28 DIAGNOSIS — I27.20 PULMONARY HYPERTENSION (HCC): ICD-10-CM

## 2023-02-28 DIAGNOSIS — J96.11 CHRONIC HYPOXEMIC RESPIRATORY FAILURE (HCC): ICD-10-CM

## 2023-02-28 DIAGNOSIS — J44.9 CHRONIC OBSTRUCTIVE PULMONARY DISEASE, UNSPECIFIED COPD TYPE (HCC): Primary | ICD-10-CM

## 2023-02-28 DIAGNOSIS — Z99.81 HYPOXEMIA REQUIRING SUPPLEMENTAL OXYGEN: ICD-10-CM

## 2023-02-28 PROCEDURE — 3074F SYST BP LT 130 MM HG: CPT | Performed by: INTERNAL MEDICINE

## 2023-02-28 PROCEDURE — 99214 OFFICE O/P EST MOD 30 MIN: CPT | Performed by: INTERNAL MEDICINE

## 2023-02-28 PROCEDURE — 1123F ACP DISCUSS/DSCN MKR DOCD: CPT | Performed by: INTERNAL MEDICINE

## 2023-02-28 PROCEDURE — 3078F DIAST BP <80 MM HG: CPT | Performed by: INTERNAL MEDICINE

## 2023-02-28 ASSESSMENT — ENCOUNTER SYMPTOMS
BLOOD IN STOOL: 0
PHOTOPHOBIA: 0
VOMITING: 0
CHEST TIGHTNESS: 0
NAUSEA: 0
BACK PAIN: 1
EYE REDNESS: 0
STRIDOR: 0
ABDOMINAL PAIN: 0
APNEA: 0
FACIAL SWELLING: 0
SHORTNESS OF BREATH: 1
EYE PAIN: 0
COLOR CHANGE: 0
EYE DISCHARGE: 0
CHOKING: 0
COUGH: 0

## 2023-02-28 NOTE — PROGRESS NOTES
Bandar Alaniz (:  1940) is a 80 y.o. male,Established patient, here for evaluation of the following chief complaint(s):  Follow-up Chronic Condition and Shortness of Breath         ASSESSMENT/PLAN:  1. Chronic obstructive pulmonary disease, unspecified COPD type ([address]  2. Chronic hypoxemic respiratory failure (HCC)  3. Hypoxemia requiring supplemental oxygen  4. Pulmonary hypertension ([address]  5. Former smoker  6. Restrictive lung disease    Pt with persistent symptoms of COPD, known moderate obstruction on latest PFT's. Control is impacted by poor medication compliance and poor O2 adherence. O2 dose increased to 4 LPM, discussed importance of O2 use. Change maintenance to Stiolto and continue prn Albuterol. Discussed indications and doses of inhalers with pt and friend. Sample and education given. Reviewed CT results, no further CT follow up indicated. Return in about 3 months (around 2023) for full PFT. Subjective   SUBJECTIVE/OBJECTIVE:   Pt is a former smoker with long standing shortness of breath with mild exertion. History is obtained mainly from pt's  sister and outside records review. Pt's dyspnea improved somewhat on supplemental O2 but pt finds the portable O2 tanks cumbersome. He is still short of breath with walking moderate distances. Pt is now using POC when outside the house. He was still noted to desaturate to mid 80'2 in the office. Pt also admits to poor compliance with maintenance inhalers and uses Albuterol regularly. Pt denies orthopnea or PND. He has waxing and waning pedal edema. Pt has a history of cardiomyopathy with slightly reduced EF. Echo also shows pulmonary hypertension, PASP 65 mm Hg. Review of historical Echos also show a positive bubble study in 2016. CT scan in 2018 revealed a new 2 x 1.6 cm pulmonary nodule. Follow  up CT ordered after his initial visit shows a 1.8 cm nodule vs R perihilar LN.  Follow up CT done 2022 shows enhancement similar to vascular structures and likely represents aneurysmal dilatation, see below. Review of Systems   Constitutional:  Positive for fatigue. Negative for activity change, appetite change, chills, diaphoresis, fever and unexpected weight change. HENT:  Negative for congestion, ear discharge, ear pain, facial swelling and hearing loss. Eyes:  Negative for photophobia, pain, discharge, redness and visual disturbance. Respiratory:  Positive for shortness of breath. Negative for apnea, cough, choking, chest tightness and stridor. Wheezing: occ. Cardiovascular:  Negative for chest pain, palpitations and leg swelling. Gastrointestinal:  Negative for abdominal pain, blood in stool, nausea and vomiting. Endocrine: Negative for cold intolerance, heat intolerance, polydipsia, polyphagia and polyuria. Genitourinary:  Negative for dysuria and hematuria. Musculoskeletal:  Positive for back pain. Negative for arthralgias, joint swelling, myalgias, neck pain and neck stiffness. Skin:  Negative for color change, pallor, rash and wound. Allergic/Immunologic: Negative for environmental allergies and food allergies. Neurological:  Negative for dizziness and headaches. Poor memory   Hematological:  Does not bruise/bleed easily. Psychiatric/Behavioral:  Negative for agitation, behavioral problems and sleep disturbance. Past Medical History:   Diagnosis Date    Adrenal myelolipoma     Benign non-nodular prostatic hyperplasia with lower urinary tract symptoms     Beta thalassemia trait     Blepharitis 05/04/2022    CAD (coronary artery disease)     Cardiomyopathy (HCC)     EF 30%    Diabetes mellitus (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  Faecalis    Enlarged prostate     GERD (gastroesophageal reflux disease)     Glaucoma suspect of both eyes 05/04/2022    Gout     Heart failure (HCC)     Hematuria Hyperlipidemia     Hypertension     Hypertensive retinopathy of both eyes 05/04/2022    mild    Incomplete emptying of bladder     Keratoconjunctivitis 02/23/2023    bilateral, mod severe    Keratoconjunctivitis of both eyes 05/04/2022    Lacunar infarction ([facility]     left basal ganglia, right cerebellar    Open-angle glaucoma 02/23/2023    bilateral, mild    Other ill-defined conditions(799.89)     gout    PAF (paroxysmal atrial fibrillation) ([facility]     Pulmonary hypertension (HCC)     Rectal polyp     Right renal mass     Schizophrenia ([facility]     Senile nuclear cataract 05/04/2022    Stopped smoking with greater than 10 pack year history     Vitamin D deficiency     Vitreous floaters, bilateral 05/04/2022    Weight loss, unintentional      Past Surgical History:   Procedure Laterality Date    COLONOSCOPY  04/17/2013    Dr. Edgar Smith  [address]    wound repair L. arm    TURP  03/01/2017     TRANSURETHRAL RESECTION OF PROSTATE;  Surgeon: Letitia Klein MD; [facility] [address] [place] [address] ENDOSCOPY       Current Outpatient Medications on File Prior to Visit   Medication Sig Dispense Refill    furosemide (LASIX) 20 MG tablet TAKE 1 TABLET BY MOUTH EVERY DAY 90 tablet 1    carvedilol (COREG) 12.5 MG tablet TAKE 1 TABLET BY MOUTH TWICE A  tablet 3    OXYGEN 3LPM continuous when walking . 2LPM when sitting      albuterol sulfate HFA (PROVENTIL;VENTOLIN;PROAIR) 108 (90 Base) MCG/ACT inhaler TAKE 2 PUFFS BY INHALATION EVERY SIX HOURS AS NEEDED FOR WHEEZING OR SHORTNESS OF BREATH.      amiodarone (CORDARONE) 200 MG tablet Take 100 mg by mouth daily      amLODIPine (NORVASC) 10 MG tablet TAKE 1 TABLET BY MOUTH DAILY      aspirin 81 MG EC tablet Take 81 mg by mouth daily      atorvastatin (LIPITOR) 40 MG tablet TAKE 1 TABLET BY MOUTH EVERY DAY      isosorbide mononitrate (IMDUR) 30 MG extended release tablet TAKE 1 TABLET BY MOUTH EVERY DAY.  PLEASE SCHEDULE FOLLOW UP WITH DOCTOR Indications: prevention of anginal chest pain associated with coronary artery disease      magnesium oxide (MAG-OX) 400 MG tablet Take 400 mg by mouth daily      OLANZapine (ZYPREXA) 5 MG tablet Take 5 mg by mouth      olmesartan (BENICAR) 40 MG tablet TAKE 1 TABLET BY MOUTH EVERY DAY FOR BLOOD PRESSURE      potassium chloride (KLOR-CON) 10 MEQ extended release tablet Take 10 mEq by mouth daily      tamsulosin (FLOMAX) 0.4 MG capsule TAKE 1 CAPSULE BY MOUTH EVERY DAY (Patient not taking: Reported on 2/28/2023)       No current facility-administered medications on file prior to visit. No Known Allergies  Family History   Problem Relation Age of Onset    Hypertension Mother     Heart Disease Mother     Hypertension Father     Hypertension Brother      Social History     Socioeconomic History    Marital status:      Spouse name: Not on file    Number of children: Not on file    Years of education: Not on file    Highest education level: Not on file   Occupational History    Not on file   Tobacco Use    Smoking status: Former     Packs/day: 0.50     Types: Cigarettes    Smokeless tobacco: Never   Substance and Sexual Activity    Alcohol use: No    Drug use: No    Sexual activity: Not on file   Other Topics Concern    Not on file   Social History Narrative    Not on file     Social Determinants of Health     Financial Resource Strain: Low Risk     Difficulty of Paying Living Expenses: Not hard at all   Food Insecurity: No Food Insecurity    Worried About Running Out of Food in the Last Year: Never true    Ran Out of Food in the Last Year: Never true   Transportation Needs: Not on file   Physical Activity: Not on file   Stress: Not on file   Social Connections: Not on file   Intimate Partner Violence: Not on file   Housing Stability: Not on file         Objective   Blood pressure 124/70, pulse 88, temperature 98.8 °F (37.1 °C), temperature source Temporal, resp.  rate 16, height 5' 2\" (1.575 m), weight 145 lb 3.2 oz (65.9 kg), SpO2 93 %. Physical Exam  Constitutional:       General: He is not in acute distress. Appearance: He is not ill-appearing, toxic-appearing or diaphoretic. HENT:      Head: Normocephalic and atraumatic. Right Ear: External ear normal.      Left Ear: External ear normal.      Nose: Nose normal. No congestion. Mouth/Throat:      Mouth: Mucous membranes are moist.      Pharynx: No oropharyngeal exudate. Eyes:      General: No scleral icterus. Right eye: No discharge. Left eye: No discharge. Conjunctiva/sclera: Conjunctivae normal.      Pupils: Pupils are equal, round, and reactive to light. Neck:      Vascular: No carotid bruit. Cardiovascular:      Rate and Rhythm: Normal rate and regular rhythm. Pulses: Normal pulses. Heart sounds: Normal heart sounds. No gallop. Pulmonary:      Effort: Pulmonary effort is normal. No respiratory distress. Breath sounds: No stridor. No wheezing, rhonchi or rales. Comments: Distant breath sounds  Chest:      Chest wall: No tenderness. Abdominal:      Palpations: There is no mass. Tenderness: There is no abdominal tenderness. Musculoskeletal:         General: No swelling, tenderness, deformity or signs of injury. Cervical back: No rigidity or tenderness. Right lower leg: Right lower leg edema: trace. Left lower leg: Left lower leg edema: trace. Lymphadenopathy:      Cervical: No cervical adenopathy. Skin:     General: Skin is warm and dry. Findings: No lesion or rash. Neurological:      General: No focal deficit present. Mental Status: He is alert and oriented to person, place, and time. Coordination: Coordination normal.      CT Result (most recent):  CT CHEST W CONTRAST 09/21/2022    Narrative  CT CHEST WITH CONTRAST    COMPARISON: CT chest 10/7/2021. INDICATION: Lung nodule, hilar adenopathy.     TECHNIQUE: Axial was performed through the chest with contrast.  Coronal and  sagittal reformations were generated. One or more dose reduction techniques were used on this CT: automated exposure  control, adjustment of the mAs and/or kVp according to patient's size, and  iterative reconstruction techniques. The specific techniques utilized on this CT  exam have been documented in the patient's electronic medical record. Digital  imaging and communications and medicine (DICOM) format image data are available  to nonaffiliated external healthcare facilities or entities on a secure, media  free, reciprocally searchable basis with patient authorization for at least a 12  month period after this study.    ============    LUNGS: At the right middle lobe, there is a 18 x 16 mm enhancing nodular focus. This focus measures with Hounsfield units identical to other vascular structures  and the medial segmental artery can be visualized tracking into this structure. No other suspicious nodules. Mild centrilobular emphysematous changes. Mild  reticulations in the caudal right middle lobe. PLEURA: Normal, with no effusion or pneumothorax. AIRWAY: Central airways are patent. MEDIASTINUM: Multichamber cardiac enlargement. The main pulmonary artery is  dilated, measuring approximately 38 mm in diameter. Additionally, there is  prominence of the left and right main pulmonary arteries, the left main  pulmonary artery measures up to 38 mm. LYMPH NODES: No mediastinal, hilar or axillary lymphadenopathy. UPPER ABDOMEN: Left adrenal 3.2 cm myolipoma. Multiple right renal cysts  measuring up to 2.5 cm at the superior pole. OTHER: No acute osseous abnormality. Multilevel degenerative disc disease of the  thoracic spine. Osteopenia.    ============    Impression  1. The previously identified 18 mm right middle lobe nodular focus enhances  similar to adjacent vasculature and appears along the course of the right medial  segmental artery.  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 2. Dilatation of the main pulmonary artery, as may be seen in pulmonary  hypertension. 3. Multichamber cardiac enlargement. 4. Left adrenal myelolipoma. 5. Mild centrilobular emphysematous changes. 6. Mild right middle lobe fibrosis. Gerson Pastrana MD  PGY-5    Attending radiologist statement: I have personally reviewed the study and this  report, and concur with the above stated findings. An electronic signature was used to authenticate this note.     --Leatha Sheth MD

## 2023-02-28 NOTE — PROGRESS NOTES
Addy Rodriguez presents today for   Chief Complaint   Patient presents with    Follow-up Chronic Condition       Is someone accompanying this pt? Family Member    Is the patient using any DME equipment during [address]? Oxygen    -[employer]         Coordination of Care:    1. Have you been to the ER, urgent care clinic since your last visit? Hospitalized since your last visit? No    2. Have you seen or consulted any other health care providers outside of the [address] [place] [address] since your last visit? Include any pap smears or colon screening. No    Medication list has been update per patient.

## 2023-03-01 ENCOUNTER — TELEPHONE (OUTPATIENT)
Age: 83
End: 2023-03-01

## 2023-03-01 NOTE — TELEPHONE ENCOUNTER
Pt is calling about inhaler (stiolto Respimat) Pt is saying the inhaler is not working for him, Pt would like to speak with a Nurse. Please call [phone]. -LB

## 2023-03-01 NOTE — TELEPHONE ENCOUNTER
Patient needed assistance in priming the Stiolto and how to use it TOP.  Patient instructed and now understanding inhaler

## 2023-03-06 ENCOUNTER — OFFICE VISIT (OUTPATIENT)
Age: 83
End: 2023-03-06

## 2023-03-06 VITALS
OXYGEN SATURATION: 91 % | DIASTOLIC BLOOD PRESSURE: 78 MMHG | WEIGHT: 148 LBS | SYSTOLIC BLOOD PRESSURE: 138 MMHG | BODY MASS INDEX: 27.07 KG/M2 | HEART RATE: 50 BPM | TEMPERATURE: 97.8 F

## 2023-03-06 DIAGNOSIS — I48.0 PAROXYSMAL ATRIAL FIBRILLATION (HCC): Primary | ICD-10-CM

## 2023-03-06 RX ORDER — AMIODARONE HYDROCHLORIDE 100 MG/1
100 TABLET ORAL DAILY
Qty: 90 TABLET | Refills: 2 | Status: SHIPPED | OUTPATIENT
Start: 2023-03-06

## 2023-03-06 RX ORDER — AMIODARONE HYDROCHLORIDE 100 MG/1
100 TABLET ORAL DAILY
COMMUNITY
End: 2023-03-06 | Stop reason: SDUPTHER

## 2023-03-06 NOTE — PROGRESS NOTES
Identified pt with two pt identifiers(name and ). Reviewed record in preparation for visit and have obtained necessary documentation. Shonna Harrison presents today for   Chief Complaint   Patient presents with    Follow-up     6m post echo        Pt c/o SOB. Shonna Harrison preferred language for health care discussion is english/other. Personal Protective Equipment:   Personal Protective Equipment was used including: mask-surgical and hands-gloves. Patient was placed on no precaution(s). Patient was masked. Precautions:   Patient currently on None  Patient currently roomed with door closed. Is someone accompanying this pt? no    Is the patient using any DME equipment during OV? 02 NC 3L    Depression Screening:  PHQ-9 Questionaire 2022 10/5/2022 2022 2022 2022 2022 11/15/2021   Little interest or pleasure in doing things 0 0 0 0 0 0 0   Feeling down, depressed, or hopeless 0 0 0 0 0 0 0   PHQ-9 Total Score 0 0 0 0 0 0 0        Learning Assessment:  No question data found. Abuse Screening: AMB Abuse Screening 3/6/2023   Do you ever feel afraid of your partner? N   Are you in a relationship with someone who physically or mentally threatens you? N   Is it safe for you to go home? Y          Fall Risk  Fall Risk 3/6/2023   2 or more falls in past year? no   Fall with injury in past year? no         Pt currently taking Anticoagulant therapy? no  Pt currently taking Antiplatelet therapy? unk    Coordination of Care:  1. Have you been to the ER, urgent care clinic since your last visit? Hospitalized since your last visit? no    2. Have you seen or consulted any other health care providers outside of the [address] [place] [address] since your last visit? Include any pap smears or colon screening. no      Please see Red banners under Allergies and Med Rec to remove outside inquires. All correct information has been verified with patient and added to chart. Medication's patient's would liked removed has been marked not taking to be removed per Verbal order and read back per Florencio Richardson MD

## 2023-03-06 NOTE — TELEPHONE ENCOUNTER
PCP:  Jamie Hutton MD    Last appt: [unfilled]  Future Appointments   Date Time Provider Beck Barth   6/13/2023 11:30 AM Peace Faye MD [facility] BS AMB   9/18/2023 11:40 AM Rosio Wise MD [facility] BS AMB       Requested Prescriptions     Pending Prescriptions Disp Refills    amiodarone (PACERONE) 100 MG tablet 90 tablet 2     Sig: Take 1 tablet by mouth daily

## 2023-03-06 NOTE — PATIENT INSTRUCTIONS
New Medication/Medication Changes    Start Amiodarone 100 mg take 1 tab daily         New Address Projected for May 2023    [id number] [facility] allow 24-48 hrs for medication to be escribed to pharmacy** If you need any refills on medications please contact your pharmacy so that the request can be escribed to the provider for review seven to 10 days prior to being out of medication.

## 2023-03-14 NOTE — TELEPHONE ENCOUNTER
Patient having a hard time understanding how to use the Stiolto. I offered him to come to office again but he has not ride. I helped him over the phone and he did it but does not feel comfortable.   Want to go to a easier inhaler type

## 2023-03-15 RX ORDER — UMECLIDINIUM BROMIDE AND VILANTEROL TRIFENATATE 62.5; 25 UG/1; UG/1
1 POWDER RESPIRATORY (INHALATION) DAILY
Qty: 1 EACH | Refills: 5 | Status: SHIPPED | OUTPATIENT
Start: 2023-03-15

## 2023-03-18 NOTE — PROGRESS NOTES
Subjective:      Harvey Littlejohn is seen today for cardiac re-evaluation. He is a 80 y.o. man with multiple medical problems including coronary artery disease, cardiomyopathy with ejection fraction of 40 to 45% %, pulmonic stenosis/regurgitation, prior history of endocarditis of the pulmonic valve, paroxysmal atrial fibrillation, pulmonary hypertension, schizophrenia, chronic Amiodarone therapy, hyperlipidemia, diabetes and hypertension. The patient was seen in early 2019 for follow up of  elevated LFTs. The patient is on Amiodarone therapy with a starting date which was not completely clear. He did require a cardioversion  in 2016 which was likely to have been the time he was started on Amiodarone. His Amiodarone was decreased to 100 mg daily at a prior visit. He is also on statin therapy. In the office today, he reports that his breathing has been \"good so far \". He reports that he does nothing. He has had no peripheral swelling. He has had no palpitations.       Patient Active Problem List    Diagnosis Date Noted    Chronic hypoxemic respiratory failure ([address] 11/11/2021    Pulmonary hypertension due to left heart disease ([address] 11/11/2021    Chest pain 04/27/2021    Altered mental status 11/08/2019    Aptyalism 11/08/2019    Chronic obstructive pulmonary disease ([address] 11/08/2019    Enlarged prostate 11/08/2019    Fall 11/08/2019    Infective endocarditis 11/08/2019    Hypoxia 11/08/2019    Heart valve disease 11/08/2019    Gram-positive bacteremia 11/08/2019    Shortness of breath 11/08/2019    Sensory hearing loss 11/08/2019    Retention of urine 11/08/2019    Personal history of other medical treatment 11/08/2019    Palpitations 11/08/2019    Pulmonic stenosis 12/28/2018    Pulmonary hypertension (HCC)     LVH (left ventricular hypertrophy) 03/17/2018    On amiodarone therapy 03/17/2018    RBBB 03/17/2018    Paroxysmal atrial fibrillation ([address] 11/81/5427    Diastolic dysfunction 08/10/7216    Pneumonia due to infectious organism 03/08/2018    Lung nodule 03/08/2018    SIRS (systemic inflammatory response syndrome) (HCC) 02/08/2018    Elevated troponin 02/07/2018    Type 2 diabetes mellitus with chronic kidney disease ([facility] 02/03/2018    Type 2 diabetes mellitus without complication, without long-term current use of insulin ([facility] 01/25/2018    Hyperlipidemia 01/25/2018    Syncope and collapse 07/19/2017    Left adrenal mass ([facility] 06/25/2017    Acute bacterial endocarditis 09/28/2016    DOMINIC (acute kidney injury) ([facility] 09/09/2016    Chronic systolic congestive heart failure ([facility] 02/08/2016    Thalassemia 01/27/2016    Gastroesophageal reflux disease with esophagitis 01/27/2016    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    Swelling of lower extremity 01/27/2016    Hypokalemia 01/27/2016    Visual hallucinations 01/27/2016    Essential hypertension 01/27/2016    Congestive heart failure ([facility] 01/27/2016    Chronic schizophrenia ([facility] 01/27/2016    Chronic kidney disease 01/27/2016    Gout 01/27/2016    Chronic coronary artery disease 01/27/2016    Hematuria 01/27/2016    Acute on chronic diastolic heart failure ([facility] 12/30/2015    Non-adherence to medical treatment 11/26/2015    GERD (gastroesophageal reflux disease) 04/23/2011    QT prolongation 04/23/2011    Refusal of blood transfusions as patient is Worship 03/17/2011     Current Outpatient Medications   Medication Sig Dispense Refill    olmesartan (BENICAR) 40 mg tablet TAKE 1 TABLET BY MOUTH EVERY DAY FOR BLOOD PRESSURE 90 Tablet 1    tamsulosin (FLOMAX) 0.4 mg capsule TAKE 1 CAPSULE BY MOUTH EVERY DAY 90 Capsule 1    magnesium oxide (MAG-OX) 400 mg tablet Take 1 Tablet by mouth in the morning. Indications: low amount of magnesium in the blood 90 Tablet 1    potassium chloride SR (KLOR-CON 10) 10 mEq tablet Take 1 Tablet by mouth daily.  90 Tablet 3    albuterol (PROVENTIL HFA, VENTOLIN HFA, PROAIR HFA) 90 mcg/actuation inhaler TAKE 2 PUFFS BY INHALATION EVERY SIX HOURS AS NEEDED FOR WHEEZING OR SHORTNESS OF BREATH. 8.5 Each 5    carvediloL (COREG) 12.5 mg tablet TAKE 1 TABLET BY MOUTH TWICE A  Tablet 3    isosorbide mononitrate ER (IMDUR) 30 mg tablet TAKE 1 TABLET BY MOUTH EVERY DAY. PLEASE SCHEDULE FOLLOW UP WITH DOCTOR  Indications: prevention of anginal chest pain associated with coronary artery disease 90 Tablet 3    atorvastatin (LIPITOR) 40 mg tablet TAKE 1 TABLET BY MOUTH EVERY DAY 90 Tablet 4    amLODIPine (NORVASC) 10 mg tablet TAKE 1 TABLET BY MOUTH DAILY 90 Tablet 3    amiodarone (CORDARONE) 200 mg tablet Take 0.5 Tablets by mouth daily. Indications: prevention of recurrent atrial fibrillation 90 Tablet 3    umeclidinium-vilanteroL (Anoro Ellipta) 62.5-25 mcg/actuation inhaler Take 1 Puff by inhalation daily. 3 Each 1    aspirin delayed-release 81 mg tablet Take 1 Tablet by mouth daily. 90 Tablet 3    inhalational spacing device Use with Albuterol inhaler 1 Each 3    Oxygen 3LPM continuous when walking . 2LPM when sitting      OLANZapine (ZyPREXA) 5 mg tablet Take 5 mg by mouth nightly. No Known Allergies  Past Medical History:   Diagnosis Date    Adrenal myelolipoma     Benign non-nodular prostatic hyperplasia with lower urinary tract symptoms     Beta thalassemia trait     Blepharitis 05/04/2022    CAD (coronary artery disease)     Cardiomyopathy (HCC)     EF 30%    Diabetes mellitus ([address]     Diabetic nephropathy (HCC)     Diverticulosis     Elevated PSA     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  Faecalis    Enlarged prostate     GERD (gastroesophageal reflux disease)     Glaucoma suspect of both eyes 05/04/2022    Gout     Heart failure ([address]     Hematuria     Hyperlipidemia     Hypertension     Hypertensive retinopathy of both eyes 05/04/2022    mild    Incomplete emptying of bladder     Keratoconjunctivitis of both eyes 05/04/2022    Lacunar infarction ([address]     left basal ganglia, right cerebellar    Other ill-defined conditions(799.89)     gout    PAF (paroxysmal atrial fibrillation) (HCC)     Pulmonary hypertension (HCC)     Rectal polyp     Right renal mass     Schizophrenia (HCC)     Senile nuclear cataract 05/04/2022    Stopped smoking with greater than 10 pack year history     Vitamin D deficiency     Vitreous floaters, bilateral 05/04/2022    Weight loss, unintentional      Past Surgical History:   Procedure Laterality Date    HX COLONOSCOPY  04/17/2013    Dr. Sandra Aguirre    HX ENDOSCOPY      HX ORTHOPAEDIC  7825'Q    wound repair L. arm    HX TURP  03/01/2017     TRANSURETHRAL RESECTION OF PROSTATE;  Surgeon: Dinah Pacheco MD; [facility]     Family History   Problem Relation Age of Onset    Hypertension Mother     Heart Disease Mother     Hypertension Father     Hypertension Brother      Social History     Tobacco Use   Smoking Status Former    Packs/day: 0.50    Years: 30.00    Pack years: 15.00    Types: Cigarettes   Smokeless Tobacco Never          Review of Systems, additional:  Constitutional: negative  Eyes: negative  Respiratory: positive for dyspnea on exertion - improving  Cardiovascular: positive for dyspnea on exertion - improving  Gastrointestinal: negative  Musculoskeletal:negative  Neurological: negative  Behvioral/Psych: negative  Endocrine: negative  ENT: negative    Objective:     Visit Vitals  /85   Pulse 60   Temp 97.8 °F (36.6 °C) (Temporal)   Wt 148 pounds   SpO2 91% Comment: pt on 3 liters of oxygen   BMI 27.07 kg/m²     General:  alert, cooperative, no distress   Chest Wall: inspection normal - no chest wall deformities or tenderness, respiratory effort normal   Lung: Crackles left base. Heart:  normal rate and regular rhythm, S1 and S2 normal, decrescendo murmur 2-3/6 at the left second intercostal space border   Abdomen: soft, non-tender.  Bowel sounds normal. No masses,  no organomegaly   Extremities: 1+ pitting edema to lower extremities Skin: no rashes   Neuro: alert, oriented, normal speech, no focal findings or movement disorder noted     10/5/2022. Sinus rhythm with first-degree AV block. Right bundle branch block. Left posterior fascicular block. Diffuse nondiagnostic ST and T wave normalities    Assessment/Plan:         ICD-10-CM ICD-9-CM    1. LVH (left ventricular hypertrophy), moderate by recent echo 02/07/2018. Other findings including severely dilated RV and pulmonary hypertension, moderate pulmonic stenosis with last transvalvular velocity in 2016, 3.2 m/s. Echocardiogram completed on 11/15/2022. Reduced LV systolic function with EF of 40 to 45%. Mild to moderate AI. Mild MR. Moderate TR. Severe PI. Left atrium severely dilated. Severe pulmonary hypertension. I51.7 429.3    2. Hyperlipidemia, unspecified hyperlipidemia type 12/13/2022. Total cholesterol 99. Triglycerides 37. HDL 53 LDL 35. The patient is is taking atorvastatin 40 mg daily. E78.5 272.4    3. Chronic coronary artery disease, nuclear stress test done 02/2016; small apical lateral scar with no significant ischemia. I25.10 414.00    4. Congestive heart failure, unspecified congestive heart failure chronicity, unspecified congestive heart failure type ([facility] stable symptoms, reports he has not been taking furosemide I50.9 428.0    5. Essential hypertension, elevated systolic BP in the office today. I10 401.9    6. Type 2 diabetes mellitus with nephropathy (HCC) hemoglobin A1c 5.7 on 12/13/2022 E11.21 250.40      583.81    7. Chronic kidney disease, unspecified CKD stage N18.9 585.9    8. Chronic schizophrenia ([facility] F20.9 295.62    9. On amiodarone therapy, taking 100 mg daily. Total bilirubin 1.5. AST and ALT WNL. Z79.899 V58.69    10. Paroxysmal atrial fibrillation ([facility] S/P cardioversion 1/4/2016, on amiodarone 100 mg daily I48.0 427.31    11   Pulmonic stenosis, with severe PI seen on most recent echocardiogram.  Return in 6 months.

## 2023-03-21 DIAGNOSIS — Z76.0 ENCOUNTER FOR ISSUE OF REPEAT PRESCRIPTION: ICD-10-CM

## 2023-03-21 DIAGNOSIS — E87.6 HYPOKALEMIA: ICD-10-CM

## 2023-03-21 DIAGNOSIS — Z79.899 OTHER LONG TERM (CURRENT) DRUG THERAPY: ICD-10-CM

## 2023-03-21 RX ORDER — MAGNESIUM OXIDE 400 MG/1
TABLET ORAL
Qty: 90 TABLET | Refills: 1 | Status: SHIPPED | OUTPATIENT
Start: 2023-03-21

## 2023-03-21 RX ORDER — FINASTERIDE 5 MG/1
TABLET, FILM COATED ORAL
Qty: 90 TABLET | Refills: 1 | OUTPATIENT
Start: 2023-03-21

## 2023-03-21 RX ORDER — ISOSORBIDE MONONITRATE 30 MG/1
TABLET, EXTENDED RELEASE ORAL
Qty: 90 TABLET | Refills: 3 | Status: SHIPPED | OUTPATIENT
Start: 2023-03-21

## 2023-03-21 RX ORDER — TAMSULOSIN HYDROCHLORIDE 0.4 MG/1
CAPSULE ORAL
Qty: 90 CAPSULE | Refills: 1 | Status: SHIPPED | OUTPATIENT
Start: 2023-03-21

## 2023-03-21 NOTE — TELEPHONE ENCOUNTER
Finasteride is listed as discontinued as of 10/5/22. Is he still taking this? Please obtain more information. Other 3 refilled.

## 2023-03-22 NOTE — TELEPHONE ENCOUNTER
Spoke with pt in regards to medication refills and med inquiry. . Two patient identifier's verified. Relayed the Covering Provider's note. Pt states he is not taking finasteride and does not need this medication. Pt voices no concerns at this time.

## 2023-04-20 RX ORDER — AMLODIPINE BESYLATE 10 MG/1
TABLET ORAL
Qty: 90 TABLET | Refills: 3 | Status: SHIPPED | OUTPATIENT
Start: 2023-04-20

## 2023-05-30 ENCOUNTER — TELEPHONE (OUTPATIENT)
Facility: CLINIC | Age: 83
End: 2023-05-30

## 2023-06-07 DIAGNOSIS — E78.5 HYPERLIPIDEMIA, UNSPECIFIED: ICD-10-CM

## 2023-06-07 DIAGNOSIS — I25.10 ATHEROSCLEROTIC HEART DISEASE OF NATIVE CORONARY ARTERY WITHOUT ANGINA PECTORIS: ICD-10-CM

## 2023-06-08 RX ORDER — ATORVASTATIN CALCIUM 40 MG/1
TABLET, FILM COATED ORAL
Qty: 90 TABLET | Refills: 4 | Status: SHIPPED | OUTPATIENT
Start: 2023-06-08

## 2023-06-22 DIAGNOSIS — E55.9 VITAMIN D DEFICIENCY: Primary | ICD-10-CM

## 2023-06-22 RX ORDER — ERGOCALCIFEROL 1.25 MG/1
50000 CAPSULE ORAL WEEKLY
Qty: 12 CAPSULE | Refills: 1 | Status: SHIPPED | OUTPATIENT
Start: 2023-06-22

## 2023-06-22 NOTE — PROGRESS NOTES
Orders Placed This Encounter    vitamin D (ERGOCALCIFEROL) 1.25 MG (15831 UT) CAPS capsule     Sig: Take 1 capsule by mouth once a week     Dispense:  12 capsule     Refill:  1

## 2023-08-21 ENCOUNTER — HOME HEALTH ADMISSION (OUTPATIENT)
Age: 83
End: 2023-08-21
Payer: MEDICARE

## 2023-08-23 ENCOUNTER — HOME CARE VISIT (OUTPATIENT)
Age: 83
End: 2023-08-23
Payer: MEDICARE

## 2023-08-23 VITALS
OXYGEN SATURATION: 92 % | TEMPERATURE: 97.8 F | RESPIRATION RATE: 20 BRPM | SYSTOLIC BLOOD PRESSURE: 130 MMHG | HEART RATE: 68 BPM | DIASTOLIC BLOOD PRESSURE: 70 MMHG

## 2023-08-23 PROCEDURE — G0299 HHS/HOSPICE OF RN EA 15 MIN: HCPCS

## 2023-08-23 ASSESSMENT — ENCOUNTER SYMPTOMS
HEMOPTYSIS: 0
DYSPNEA ACTIVITY LEVEL: AT REST
PAIN LOCATION - PAIN QUALITY: ACHE

## 2023-08-24 ENCOUNTER — HOME CARE VISIT (OUTPATIENT)
Age: 83
End: 2023-08-24
Payer: MEDICARE

## 2023-08-24 VITALS
HEART RATE: 70 BPM | OXYGEN SATURATION: 91 % | SYSTOLIC BLOOD PRESSURE: 115 MMHG | TEMPERATURE: 97.3 F | DIASTOLIC BLOOD PRESSURE: 65 MMHG | RESPIRATION RATE: 18 BRPM

## 2023-08-24 PROCEDURE — G0151 HHCP-SERV OF PT,EA 15 MIN: HCPCS

## 2023-08-24 NOTE — HOME HEALTH
Skilled services/Home bound verification:  PATIENT AND CAREGIVER UNDERSTAND HOMEBOUND STATUS. Skilled Reason for admission/summary of clinical condition:  Chronic Combined systolic and diastolic heart failure. .  This patient is homebound for the following reasons Requires considerable and taxing effort to leave the home , Requires the assistance of 1 or more persons to leave the home  and Only leaves the home for medical reasons or Spiritism services and are infrequent and of short duration for other reasons . Laurent Murray is CG she is available all days and some evenings. Medications reconciled and all medications are available in the home this visit. The following education was provided regarding medications: All medications should be given the same time daily. Medications  are somewhat effective at this time. High risk medication teaching regarding antiplatelets,was done. Patient on 81mg ASA, I  I discussed the signs of amanda and occult bleeding, prevention of bleeding when taking these medications and when to call MD      Home health supplies by type and quantity ordered/delivered this visit include: N/A    Patient education provided this visit to include:  Patient is a fall risk, I recommend supervision at all times, keeping walk ways/halls clear of clutter. We also went over what CHF is and how better to manage it and be aware of symptoms before it get worse. Went over the different positions he can get into to increase oxygenation ie: Tripod position. Also discussed importance of conserving energy to decrease SOB. Discussed the need to sleep on a wedge or 2-3 pillows to also maximize oxygenation. Went over COPD and the diseases possible progression. MEDICATION ADHERENCE IS AN IMPORTANT COMPONENT OF HTN MANAGEMENT. PATIENT STATES THE IMPORTANCE TO TAKE HTN MEDS SAME TIME EACH DAY.   Pt aware to use oxygen therapy as prescribed by MD, patient instructed to be very

## 2023-08-24 NOTE — CASE COMMUNICATION
SOC done om 8/23/23. Patient ambulates with walker (rollator). Patient VSS sats hover 90-92% with no exertion. Uses Fi02 at 3L continuously. SN/PT/OT all ordered and accepted by both patient and CG.

## 2023-08-24 NOTE — HOME HEALTH
PMHx:H+P per hospital on 7/25/23 Mr. Morgan Fatima presented to [facility] ED 7/25/23 with primary admission diagnosis of symptomatic hyponatremia suspected due to hypovolemia with LOC prior to admission concerning for possible hyponatremia-induced seizures. Accompanied by nausea/emesis with periumbilical discomfort for 2-3 days. Reported episode of acute LOC while in his kitchen at home. Denied any pre-LOC symptoms. Patient did have urinary incontinence, Denies any history of known seizure disorder. In ED initial sodium level of 118 improved to 120 with 3% hypertonic saline. Sodium level of 106 between two readings felt spurious. He reports feeling some clinical improvement since presentation. Evaluated by ICU for admission who declined ICU admission. Lasix held and treated with gentle IV hydration with gradual improvement in sodium levels. Sodium level normalized. Patient was assessed by PT/OT who recommended SNF for restorative care needs. Reasonable to monitor sodium levels once or twice weekly during rehab period. Discharge to snf rehab after completing [id number] authorization put on hold due to syncopal episode after becoming bradycardic to the 30s while working with PT. Developed secondary acute hypoxic respiratory failure. EKG appears unchanged from prior tracings on review. EP cardiology was consulted. Coreg placed on hold. No acute findings on chest x-ray. Labs revealed mildly elevated troponins consistent with demand ischemia. Weaned oxygen back to his baseline. Patient underwent NST and PVL carotid which were negative. Patient was cleared for discharge by cardiology and remained medically appropriate for discharge. Patient was [facility] to [facility] 8/8/23 and will follow with cardiology on discharge.    List of Comorbidities:   Chronic combined systolic (congestive) and diastolic (congestive) heart failure  Chronic kidney disease, stage 3 unspecified  Hypo-osmolality and hyponatremia  Unspecified atrial

## 2023-08-29 ENCOUNTER — HOME CARE VISIT (OUTPATIENT)
Age: 83
End: 2023-08-29
Payer: MEDICARE

## 2023-08-29 VITALS
OXYGEN SATURATION: 91 % | DIASTOLIC BLOOD PRESSURE: 80 MMHG | SYSTOLIC BLOOD PRESSURE: 126 MMHG | HEART RATE: 64 BPM | TEMPERATURE: 98.2 F

## 2023-08-29 VITALS
DIASTOLIC BLOOD PRESSURE: 70 MMHG | SYSTOLIC BLOOD PRESSURE: 138 MMHG | RESPIRATION RATE: 16 BRPM | HEART RATE: 62 BPM | OXYGEN SATURATION: 96 % | TEMPERATURE: 98.2 F

## 2023-08-29 PROCEDURE — G0157 HHC PT ASSISTANT EA 15: HCPCS

## 2023-08-29 PROCEDURE — G0300 HHS/HOSPICE OF LPN EA 15 MIN: HCPCS

## 2023-08-29 ASSESSMENT — ENCOUNTER SYMPTOMS: STOOL DESCRIPTION: SOFT FORMED

## 2023-08-29 NOTE — HOME HEALTH
visit. Home exercise program: Patient will be sure to monitor his blood pressure twice daily and record on daily log for nursing to review upon return. Continued need for the following skills: Nursing will continue to follow patient until education is understood and able to be verbalized by patient/caregiver. Plan for next visit: Continue to educate, assess pill box, assess vitals, and review medications    Patient and/or caregiver notified and agrees to changes in the Plan of Care: NA    The following discharge planning was discussed with the pt/caregiver: when patient reaches goals and medication is managed, and disease processes are understood patient agrees and understand that discharge will take place.

## 2023-08-29 NOTE — HOME HEALTH
SUBJECTIVE: The pt answered the door without the use of an AD. Pt to seen changing O2 hose and was not correctly connected to the concentrator. PTA educated the pt on O2 hose management for safety to reduce the risks of falls. Calixto Damico PAIN: see pain assessment  OBJECTIVE: see interventions  . NEXT MD APPT: 8/30/23 with PCP  . CAREGIVER ASSISTANCE NEEDED FOR: The pt lives alone, has a sister and friend that assists with care as needed: shopping, meals, medication management, transportation. .  ASSESSMENT AND PROGRESS TOWARD GOALS:  The pt demonstrated a good result in HHPT as evidence of the ability to increase his SpO2 to 97% with supine breathing exs. He is able to perform bed mobility with mod(I) and did not demonstrate any SOB with today's activities. He does present with mild samara LE edema which is going to be addressed at his PCP appt tomorrow. He will benefit from continued HHPT to increase strength, balance, and endurance to allow him to continue to live at home with (I) and improved safety awareness. PATIENT RESPONSE TO TREATMENT: The pt had increases in SpO2 to 97% in supine with breathing exs, improved from 90-92% in sitting upon arrival.   PATIENT LEVEL OF UNDERSTANDING OF EDUCATION: Fair, the pt will require continued education for ther exs and safety. .  CONTINUED NEED FOR THE FOLLOWING SKILLS: HH PT is medically necessary to address pain, decreased ROM, decreased strength, increased swelling, decreased independence with functional transfers, impaired gait, impaired stair negotiation, and impaired balance in order to improve functional independence, quality of life, return to PLOF, reduce the risk for falls, and reduce pain. Calixto Damico PLAN: Plan to progress the strengthening program and ensure proper technique of the ther exs of the HEP next visit.     DISCHARGE PLANNING DISCUSSED: Discharge to self and family under MD supervision once all goals have been met or patient has reached maximum potential.

## 2023-08-31 ENCOUNTER — HOME CARE VISIT (OUTPATIENT)
Age: 83
End: 2023-08-31
Payer: MEDICARE

## 2023-08-31 VITALS
HEART RATE: 53 BPM | DIASTOLIC BLOOD PRESSURE: 60 MMHG | TEMPERATURE: 97.7 F | SYSTOLIC BLOOD PRESSURE: 122 MMHG | OXYGEN SATURATION: 92 %

## 2023-08-31 VITALS
HEART RATE: 61 BPM | SYSTOLIC BLOOD PRESSURE: 146 MMHG | RESPIRATION RATE: 16 BRPM | TEMPERATURE: 98.1 F | DIASTOLIC BLOOD PRESSURE: 82 MMHG | OXYGEN SATURATION: 95 %

## 2023-08-31 PROCEDURE — G0157 HHC PT ASSISTANT EA 15: HCPCS

## 2023-08-31 PROCEDURE — G0300 HHS/HOSPICE OF LPN EA 15 MIN: HCPCS

## 2023-08-31 ASSESSMENT — ENCOUNTER SYMPTOMS: STOOL DESCRIPTION: SOFT FORMED

## 2023-08-31 NOTE — HOME HEALTH
SUBJECTIVE: [employer] nurse is present during today's visit. The pt will now be getting a PCA M-F from 9-3 to assist with care. Tomie Belt PAIN: see pain assessment  OBJECTIVE: see interventions  . NEXT MD APPT: Sept 13 with Dr. Shoshana Ron  . CAREGIVER ASSISTANCE NEEDED FOR: The pt lives alone, has a sister and friend that assists with care as needed and will now have a PCA M-F from 9am-3pm starting 9/5/23: shopping, meals, medication management, transportation. .  ASSESSMENT AND PROGRESS TOWARD GOALS:  The pt presents with weakness and decreased balance with SOB with activity. His feet and LEs present with decreased edema on this date allowing for improved mobility and a progression to standing ther exs. He has no c/o pain and reports that he is confident with medication management. He will benefit from continued HHPT to continue to progress his strength for improved gait and safety with functional mobility, reducing his risks of falls and risks of rehospitalizations. PATIENT RESPONSE TO TREATMENT: Increased SpO2 to 97% in supine positioning with deep breathing techniques. PATIENT LEVEL OF UNDERSTANDING OF EDUCATION: Good - the pt able to teach back the HEP with the use of the handouts. .  CONTINUED NEED FOR THE FOLLOWING SKILLS: HH PT is medically necessary to address pain, decreased ROM, decreased strength, increased swelling, decreased independence with functional transfers, impaired gait, impaired stair negotiation, and impaired balance in order to improve functional independence, quality of life, return to PLOF, reduce the risk for falls, and reduce pain. PLAN: Plan to address gait with the walker and management of the portable O2 next visit. DISCHARGE PLANNING DISCUSSED: Discharge to self and family under MD supervision once all goals have been met or patient has reached maximum potential. Discussed with the pt the POC to continue HHPT with the remaining frequency of  2w3.  The pt is in

## 2023-08-31 NOTE — HOME HEALTH
Skilled reason for visit: Chronic Kidney Disease, COPD, Hypertension,Infection Prevention, Skin Impairment, and Medication Education     Caregiver involvement: Patient's  sister is his primary caregiver and she schedules his appointments, and transports patient to and from his appointments. Patients sister  his medications and patient bathes himself as best he can when home health aide from home care is not in to assist with bathing. Patient's sister prepare's his meals and bring them to his home. Patient manages his own medications and fills his weekly pill box. Medications reviewed and all medications ARE available in the home this visit. The following education was provided regarding medications: Colace- Caregiver was educated on this medication and the purpose of it. Patient/Caregiver verbalized understanding    MD notified of any discrepancies/look a-like medications/medication interactions: NA    Medications are EFFECTIVE at this time. Home health supplies by type and quantity ordered/delivered this visit include: NA    Patient education provided this visit: See interventions Tab. Sharps education provided: NA    Patient level of understanding of education provided: Patient verbalized understanding to all education in interventions tab. Skilled Care Performed this visit: Vital Assessment, Disease, Medication, Safety and Infection Prevention, Education and Management    Patient response to procedure performed:   Patient tolerated vital assessment well and no facial grimaces or complaints of pain or discomfort. Agency Progress toward goals: Agency staff is progressing well with patient as patient verbalizes being able to better manage her disease processes with the education received from home care staff.     Patient's Progress towards personal goals:  Patient is progressing slowly as he will add is new medications to his weekly pill box and this nurse will review on next

## 2023-09-05 ENCOUNTER — HOME CARE VISIT (OUTPATIENT)
Age: 83
End: 2023-09-05
Payer: MEDICARE

## 2023-09-05 VITALS
OXYGEN SATURATION: 93 % | SYSTOLIC BLOOD PRESSURE: 145 MMHG | DIASTOLIC BLOOD PRESSURE: 74 MMHG | HEART RATE: 63 BPM | RESPIRATION RATE: 17 BRPM | TEMPERATURE: 97.2 F

## 2023-09-05 PROCEDURE — G0157 HHC PT ASSISTANT EA 15: HCPCS

## 2023-09-05 PROCEDURE — G0152 HHCP-SERV OF OT,EA 15 MIN: HCPCS

## 2023-09-05 PROCEDURE — G0299 HHS/HOSPICE OF RN EA 15 MIN: HCPCS

## 2023-09-05 NOTE — HOME HEALTH
SUBJECTIVE: The pt states he is feeling like he is getting stronger. Gene Rojo PAIN: see pain assessment    OBJECTIVE: see interventions  . NEXT MD APPT: Sept 13 with Dr. Artem Brooke  . CAREGIVER ASSISTANCE NEEDED FOR: The pt lives alone, has a sister and friend that assists with care as needed and will now have a PCA M-F from 9am-3pm. He requires assistance for: shopping, meals, medication management, transportation. .  ASSESSMENT AND PROGRESS TOWARD GOALS:  The pt demonstrated a positive result in HHPT on this date as evidence of improved energy conservation and gait mechanics with the use of the rollator walker for ambulation outside the home. He does continue to present with decreasing SpO2 with activity, but is improving with the return to PLF and the understanding of energy conservation for management of the SOB. He will benefit from continued HHPT to progress his strength, balance, and endurance to reduce his risks of falls and overall risks of rehospitalizations. Gene Rojo PATIENT RESPONSE TO TREATMENT: 5/10 RPE    PATIENT LEVEL OF UNDERSTANDING OF EDUCATION: Good - the pt verbalizes the understanding of use of the rollator walker for improving both gait mechanics and energy conservation post education on this date. .  CONTINUED NEED FOR THE FOLLOWING SKILLS: HH PT is medically necessary to address pain, decreased ROM, decreased strength, increased swelling, decreased independence with functional transfers, impaired gait, impaired stair negotiation, and impaired balance in order to improve functional independence, quality of life, return to PLOF, reduce the risk for falls, and reduce pain. PLAN: Plan to review and progress the HEP next visit. DISCHARGE PLANNING DISCUSSED: Discharge to self and family under MD supervision once all goals have been met or patient has reached maximum potential. Discussed with the pt the POC to continue HHPT with the remaining frequency of  1 more visit this week then 2w2.  The pt

## 2023-09-06 VITALS
DIASTOLIC BLOOD PRESSURE: 74 MMHG | RESPIRATION RATE: 17 BRPM | SYSTOLIC BLOOD PRESSURE: 145 MMHG | HEART RATE: 63 BPM | OXYGEN SATURATION: 93 % | TEMPERATURE: 97.2 F

## 2023-09-06 NOTE — CASE COMMUNICATION
Occupational Therapy Evaluation    1w4    Mr. Michael Knight is ambulating in his home without use of an AD but is encouraged to use the rollator to reduce risk of falls. Assistance is needed to clear walkway of O2 tubing to reduce risk of falls. He has a handicap height toilet and grab bar for tub transfers. Mr. Michael Knight was able to demonstrate ability to sit <> stand from the toilet and use grab bar to transfer into the shower with assistanc e managing tubing. He has a shower seat and is able to perform sit <> stand from the surface. He denies taking a shower since his return from the hospital.  When asked why, he stated \"I don't trust myself to do that yet. I'm still trying to recover\". PCA states that pt was already washed up and dressed upon her arrival today. Pt states that he has been sponge bathing. Educated pt on use of energy conservation strategies such as si tting during functional tasks, taking frequent rest breaks and keeping frequently used items within reach. Pt states that he sits on seat in the bathroom for sponge bathing and uses the seat on his rollator to rest as needed with meal prep. Explained role of OT to help pt advance to PLOF to include return to showers. Pt is agreeable for ongoing skilled OT to perform ADL training at the tub/shower level, address balance deficits to im prove safety with ADL/IADL tasks and reduce risk of falls.

## 2023-09-06 NOTE — HOME HEALTH
hyponatremia-induced seizures. Accompanied by nausea/emesis with periumbilical discomfort for 2-3 days. Reported episode of acute LOC while in his kitchen at home. Denied any pre-LOC symptoms. Patient did have urinary incontinence, Denies any history of known seizure disorder. In ED initial sodium level of 118 improved to 120 with 3% hypertonic saline. Sodium level of 106 between two readings felt spurious. He reports feeling some clinical improvement since presentation. Evaluated by ICU for admission who declined ICU admission. Lasix held and treated with gentle IV hydration with gradual improvement in sodium levels. Sodium level normalized. Patient was assessed by PT/OT who recommended SNF for restorative care needs. Reasonable to monitor sodium levels once or twice weekly during rehab period. Discharge to snf rehab after completing [id number] authorization put on hold due to syncopal episode after becoming bradycardic to the 30s while working with PT. Developed secondary acute hypoxic respiratory failure. EKG appears unchanged from prior tracings on review. EP cardiology was consulted. Coreg placed on hold. No acute findings on chest x-ray. Labs revealed mildly elevated troponins consistent with demand ischemia. Weaned oxygen back to his baseline. Patient underwent NST and PVL carotid which were negative. Patient was cleared for discharge by cardiology and remained medically appropriate for discharge. Patient was discharged to [facility] 8/8/23 and will follow with cardiology on discharge.       List of Comorbidities:  Chronic combined systolic (congestive) and diastolic (congestive) heart failure  Chronic kidney disease, stage 3 unspecified  Hypo-osmolality and hyponatremia  Unspecified atrial fibrillation  HTN  CAD Constipation              DME         Bath chair  Oxygen concentrator  Four wheeled walker  Front wheeled walker  Grab bars  Tub grab bars

## 2023-09-07 ENCOUNTER — HOME CARE VISIT (OUTPATIENT)
Age: 83
End: 2023-09-07
Payer: MEDICARE

## 2023-09-07 VITALS
DIASTOLIC BLOOD PRESSURE: 80 MMHG | RESPIRATION RATE: 16 BRPM | TEMPERATURE: 97.7 F | HEART RATE: 61 BPM | SYSTOLIC BLOOD PRESSURE: 138 MMHG | OXYGEN SATURATION: 96 %

## 2023-09-07 VITALS
OXYGEN SATURATION: 93 % | RESPIRATION RATE: 16 BRPM | DIASTOLIC BLOOD PRESSURE: 83 MMHG | TEMPERATURE: 97.2 F | HEART RATE: 63 BPM | SYSTOLIC BLOOD PRESSURE: 145 MMHG

## 2023-09-07 VITALS
DIASTOLIC BLOOD PRESSURE: 80 MMHG | TEMPERATURE: 97.6 F | SYSTOLIC BLOOD PRESSURE: 140 MMHG | HEART RATE: 50 BPM | OXYGEN SATURATION: 96 %

## 2023-09-07 PROCEDURE — G0300 HHS/HOSPICE OF LPN EA 15 MIN: HCPCS

## 2023-09-07 PROCEDURE — G0157 HHC PT ASSISTANT EA 15: HCPCS

## 2023-09-07 ASSESSMENT — ENCOUNTER SYMPTOMS
STOOL DESCRIPTION: SOFT FORMED
DYSPNEA ACTIVITY LEVEL: AFTER AMBULATING 10 - 20 FT

## 2023-09-07 NOTE — HOME HEALTH
SUBJECTIVE: The pt states he feels he is progressing towards PLF. Tori Chen PAIN: see pain assessment    OBJECTIVE: see interventions  . NEXT MD APPT: Sept 13 with Dr. Anthony Minaya  . CAREGIVER ASSISTANCE NEEDED FOR: The pt lives alone, has a sister and friend that assists with care as needed and will now have a PCA M-F from 9am-3pm. He requires assistance for: shopping, meals, medication management, transportation. .  ASSESSMENT AND PROGRESS TOWARD GOALS:  The pt demonstrated a positive result in HHPT on this date as noted with the ability to perform all activities without pain and maintaining SpO2 at 90% or greater. He did have one drop in SpO2 to 90% in which he did require cues for initiating a seated rest period with pursed lip breathing. He denied feeling SOB. Education provided for the need for self monitoring vitals with activity. The pt presents with signficantly fwd flexed CS and will benefit from postural strengthening to improve head positioning for alignment and balance for reducing the risks of future falls. Tori Chen PATIENT RESPONSE TO TREATMENT: The pt had one drop in SpO2 to 90% after 5x STS, denied feeling SOB. PATIENT LEVEL OF UNDERSTANDING OF EDUCATION: Good - the pt verbalizes the understanding of use of the rollator walker for improving both gait mechanics and energy conservation post education on this date. PLAN: Plan to progress the HEP with postural strengthening exs next visit. .  INTERDISCIPLINARY COMMUNICATION: The pts status and POC discussed with Joe Samayoa LPN on this date via telephone. DISCHARGE PLANNING DISCUSSED: Discharge to self and family under MD supervision once all goals have been met or patient has reached maximum potential. Discussed with the pt the POC to continue HHPT with the remaining frequency of  2w2. The pt is in agreement to the POC at this time.

## 2023-09-07 NOTE — HOME HEALTH
visit. Home exercise program: Patient will be start monitoring blood pressure three times daily as he takes blood pressure medications  three times daily. Patient will record these results in a daily log book as he did not start this since this nurse last visit stating he did not remember whether he was supposed to do it prior to medication or after taking his medication. This nurse has clarified that blood pressure should be taken prior to taking medication and two hours after taking medications. Patient has verbalized understanding. This nurse has asked scheduling to send patient a nurse on Saturday to assist him with filling his weekly pill container as he, states he think he made a mistake with his medications. This nurse offered to assist patient today and he states it will mess him up he has two days left in his weekly pill planner. This nurse has reviewed patients medications and they appear to be correct, however patient can benefit from the assistance to be sure he is doing it correctly. This nurse did explain to patient that she will review his medication container when she comes out next week. Continued need for the following skills: Nursing will continue to follow patient until education is understood and able to be verbalized by patient/caregiver. Plan for next visit: Continue to educate, assess pill box, assess vitals, and review medications    Patient and/or caregiver notified and agrees to changes in the Plan of Care: NA    The following discharge planning was discussed with the pt/caregiver: when patient reaches goals and medication is managed, and disease processes are understood patient agrees and understand that discharge will take place.

## 2023-09-08 DIAGNOSIS — Z76.0 ENCOUNTER FOR ISSUE OF REPEAT PRESCRIPTION: ICD-10-CM

## 2023-09-08 RX ORDER — FUROSEMIDE 20 MG/1
TABLET ORAL
Qty: 90 TABLET | Refills: 1 | Status: SHIPPED | OUTPATIENT
Start: 2023-09-08

## 2023-09-08 NOTE — HOME HEALTH
Skilled reason for visit: ASSESSMENT FOR PNEUMONIA AND TEACHING ON DISEASE PROCESS. Caregiver involvement:COOKS, CLEANS AND TAKES PATIENT TO MD APPT. Medications reviewed and all medications are available in the home this visit. The following education was provided regarding medications:  INSTRUCTED ON IMPORTANCE OF MEDICATION COMPLIANCY. .    MD notified of any discrepancies/look a-like medications/medication interactions: NA  Medications are IN HOME AND EFFECTIVE  at this time. Home health supplies by type and quantity ordered/delivered this visit include: NA    Patient education provided this visit: TAUGHT S/S OF CHF, SEE NSG INTERVENTIONS, INSTRUCTED ON WHEN TO NOTIFY MD OF MEDICAL CHANGES SUCH AS SOB, INCREASED USE OF PILLOWS TO LAY DOWN. Sharps education provided: YES    Patient level of understanding of education provided:GOOD, VERBALIZED UNDERSTANDING. Skilled Care Performed this visit:, SEE NSG INTERVENTIONS. TAUGHT DISEASE PROCESS FOR CHF, INSTRUCT ON IMPORTANCE OF MEDICATION AND DIETARY COMPLIANCY. Patient response to procedure performed:  GOOD. Patient's Progress towards personal goals: PROGRESSING. Home exercise program: DEEP BREATHING EXERCISES TO HELP PREVENT PNEUMONIA. Continued need for the following skills: Nursing. Plan for next visit: ASSESSMENT AND TEACHING ON CHF. INSTRUCT ON USE OF 02. Patient and/or caregiver notified and agrees to changes in the Plan of Care: Yes. The following discharge planning was discussed with the pt/caregiver: PATIENT WILL BE DISCHARGED WHEN GOALS ARE MET AND PATIENT IS STABLE.

## 2023-09-09 ENCOUNTER — HOME CARE VISIT (OUTPATIENT)
Age: 83
End: 2023-09-09
Payer: MEDICARE

## 2023-09-09 DIAGNOSIS — Z76.0 ENCOUNTER FOR ISSUE OF REPEAT PRESCRIPTION: ICD-10-CM

## 2023-09-09 PROCEDURE — G0299 HHS/HOSPICE OF RN EA 15 MIN: HCPCS

## 2023-09-10 RX ORDER — TAMSULOSIN HYDROCHLORIDE 0.4 MG/1
CAPSULE ORAL
Qty: 90 CAPSULE | Refills: 1 | Status: SHIPPED | OUTPATIENT
Start: 2023-09-10

## 2023-09-11 ENCOUNTER — HOME CARE VISIT (OUTPATIENT)
Age: 83
End: 2023-09-11
Payer: MEDICARE

## 2023-09-11 VITALS
RESPIRATION RATE: 16 BRPM | SYSTOLIC BLOOD PRESSURE: 122 MMHG | HEART RATE: 62 BPM | DIASTOLIC BLOOD PRESSURE: 70 MMHG | OXYGEN SATURATION: 95 % | TEMPERATURE: 97.8 F

## 2023-09-11 VITALS
HEART RATE: 61 BPM | TEMPERATURE: 98 F | DIASTOLIC BLOOD PRESSURE: 82 MMHG | WEIGHT: 136 LBS | SYSTOLIC BLOOD PRESSURE: 142 MMHG | OXYGEN SATURATION: 92 % | BODY MASS INDEX: 24.87 KG/M2 | RESPIRATION RATE: 16 BRPM

## 2023-09-11 PROCEDURE — G0300 HHS/HOSPICE OF LPN EA 15 MIN: HCPCS

## 2023-09-11 ASSESSMENT — ENCOUNTER SYMPTOMS
ORTHOPNEA: 1
DYSPNEA ACTIVITY LEVEL: AFTER AMBULATING LESS THAN 10 FT
STOOL DESCRIPTION: SOFT FORMED

## 2023-09-11 NOTE — HOME HEALTH
Caregiver involvement: CG . Assists with ADLs, Medication management, Transportation to appointments, Meal prep and assists with ambulation. Medications reconciled and all medications are available in the home this visit. The following education was provided regarding medications,  All medications should be given the same time daily. .  Medications  are effective at this time. Home health supplies by type and quantity ordered/delivered this visit include: n/a      Patient education provided this visit to include:  Patient is a fall risk, I recommend supervision at all times, keeping walk ways/halls clear of clutter. We also went over what CHF is and how better to manage it and be aware of symptoms before it get worse. Went over the different positions he can get into to increase oxygenation ie: Tripod position. Also discussed importance of conserving energy to decrease SOB. Discussed the need to sleep on a wedge or 2-3 pillows to also maximize oxygenation. Went over COPD and the diseases possible progression. MEDICATION ADHERENCE IS AN IMPORTANT COMPONENT OF HTN MANAGEMENT. PATIENT STATES THE IMPORTANCE TO TAKE HTN MEDS SAME TIME EACH DAY. Pt aware to use oxygen therapy as prescribed by MD, patient instructed to be very careful when ambulating around oxygen tubing to prevent any falls from occurring. Due to last hospitalization he is not on Low sodium diet, he is on Sodium Chloride 1GM daily. Continue to monitor B/P and 02 sats.  (patient has both)        Patient level of understanding of education provided: Patient/CG seem to have good understanding of material presented. Patient response to procedure performed: Although appearing fatigued, pt was coopeative. Home exercise program/Homework provided:F/U with PCP, Pulmonary/Cardiology as instructed. Take medications as prescribed. Use Oxygen as prescribed, Monitor Oximeter,  Oximeter should stay at 92%. F/U with PCP.  Take frequent

## 2023-09-12 ENCOUNTER — HOME CARE VISIT (OUTPATIENT)
Age: 83
End: 2023-09-12
Payer: MEDICARE

## 2023-09-12 VITALS
SYSTOLIC BLOOD PRESSURE: 132 MMHG | TEMPERATURE: 97.5 F | HEART RATE: 63 BPM | DIASTOLIC BLOOD PRESSURE: 76 MMHG | OXYGEN SATURATION: 94 % | RESPIRATION RATE: 18 BRPM

## 2023-09-12 PROCEDURE — G0158 HHC OT ASSISTANT EA 15: HCPCS

## 2023-09-12 PROCEDURE — G0157 HHC PT ASSISTANT EA 15: HCPCS

## 2023-09-12 RX ORDER — AMIODARONE HYDROCHLORIDE 100 MG/1
100 TABLET ORAL DAILY
Qty: 90 TABLET | Refills: 2 | Status: SHIPPED | OUTPATIENT
Start: 2023-09-12

## 2023-09-12 NOTE — HOME HEALTH
SUBJECTIVE: pt coming out of bathroom upon arrival, reported he had just finished taking a shower before his aide arrived. Pt difficult at times to engage in activity and keep on task. CAREGIVER ASSISTANCE NEEDED FOR: pts PCA present during visit for assistance as needed  MEDICATIONS REVIEWED AND UPDATED: no medication changes reported this visit  . OBJECTIVE: see interventions  PATIENT RESPONSE TO TREATMENT: Pt demonstrated positive response to treatment with no increased pain and fair participation. [facility] PATIENT/CAREGIVER EDUCATION PROVIDED THIS VISIT:  Therapist educated pt on importance of daily HEP performance, performing IADLs when he is on his own and initiating self-care daily. PATIENT LEVEL OF UNDERSTANDING OF EDUCATION PROVIDED: Pt verbalized understanding and demonstrated teachback of HEP for carry over. ASSESSMENT AND PROGRESS TOWARD GOALS: Pt demonstrated good progress towards ADL, transfer and UE strengthening goals with good understanding of education. PLAN: next visit will be for IADL training in kitchen to increase pt safety and I when he is alone. DISCHARGE PLANNING DISCUSSED: Discharge to self and family under MD supervision once all goals have been met or patient has reached maximum potential. Frequency remaininw2 remaining.

## 2023-09-13 VITALS
TEMPERATURE: 97.5 F | OXYGEN SATURATION: 96 % | SYSTOLIC BLOOD PRESSURE: 153 MMHG | DIASTOLIC BLOOD PRESSURE: 81 MMHG | HEART RATE: 71 BPM

## 2023-09-13 NOTE — HOME HEALTH
SUBJECTIVE: The pt states that he feels more confident with his medication management since the review of filling the pill box on Saturday. He states he is monitoring his daily weight and BP. Tori Chen PAIN: see pain assessment  OBJECTIVE: see interventions    NEXT MD APPT: Sept 13 with Dr. Anthony Minaya  . CAREGIVER ASSISTANCE NEEDED FOR: The pt lives alone, has a sister and friend that assists with care as needed and will now have a PCA M-F from 9am-3pm. He requires assistance for: shopping, meals, medication management, transportation. .  ASSESSMENT AND PROGRESS TOWARD GOALS:  The pt is progressing towards goals for HHPT as he is ambulating mod(I) with the rollator walker while using the walker for assistance of management of the portable O2 to get to and from the front of the building for the ability to safely get to and from medical appts. He is demonstrating good technique with the ther exs. He will benefit from 3 more HHPT visits to progress the HEP and continue to educate on breathing and energy conservation techniques to reduce future hospitalizations. Tori Chen PATIENT RESPONSE TO TREATMENT: SpO2 at 92% on 3L of O2 post ambulation. PATIENT LEVEL OF UNDERSTANDING OF EDUCATION: Good - good return demonstration of the ther exs on this date. PLAN: Tinetti Balance test and progression of the HEP next visit. DISCHARGE PLANNING DISCUSSED: Discharge to self and family under MD supervision once all goals have been met or patient has reached maximum potential. Discussed with the pt the POC to continue HHPT with the remaining frequency of 1 more visit this week then  2w1 with planned DC on the second visit of the week next week. The pt is in agreement to the POC at this time.

## 2023-09-15 ENCOUNTER — HOME CARE VISIT (OUTPATIENT)
Age: 83
End: 2023-09-15
Payer: MEDICARE

## 2023-09-15 VITALS
TEMPERATURE: 97.5 F | SYSTOLIC BLOOD PRESSURE: 132 MMHG | DIASTOLIC BLOOD PRESSURE: 80 MMHG | HEART RATE: 55 BPM | OXYGEN SATURATION: 93 %

## 2023-09-15 PROCEDURE — G0157 HHC PT ASSISTANT EA 15: HCPCS

## 2023-09-15 NOTE — CASE COMMUNICATION
The pt is a DC on the second visit of next week. He has progressed nicely towards HHPT goals. He will require a review of the HEP and education for pursed lip breathing and the benefits of ex. His SpO2 greatly improves with supine ther exs. The pt demonstrated a positive result in HHPT on this date as evidence of an improved Tinetti Test to 23/28 on this date improved from 15/28 on the initial evaluation. He would benefit from improve d compliance with the HEP for strengthening of the musculoskeletal and cardiopulmonary systems. Functionally he is progression towards goals for HHPT as he is mod(I) with ambulation throughout the home and with ambulation with the rollator walker outside the home.

## 2023-09-15 NOTE — HOME HEALTH
SUBJECTIVE:  The pt states he has been performing \"some of the lying down exercises\". Mina Alvarenga PAIN: see pain assessment    OBJECTIVE: see interventions    NEXT MD APPT: KAMARI  . CAREGIVER ASSISTANCE NEEDED FOR: The pt lives alone, has a sister and friend that assists with care as needed and will now have a PCA M-F from 9am-3pm. He requires assistance for: shopping, meals, medication management, transportation. .  ASSESSMENT AND PROGRESS TOWARD GOALS: The pt demonstrated a positive result in HHPT on this date as evidence of an improved Tinetti Test to 23/28 on this date improved from 15/28 on the initial evaluation. He would benefit from improved compliance with the HEP for strengthening of the musculoskeletal and cardiopulmonary systems. Functionally he is progression towards goals for HHPT as he is mod(I) with ambulation throughout the home and with ambulation with the rollator walker outside the home. Mina Caydenchiquita PATIENT RESPONSE TO TREATMENT:  Increase in SpO2 to 99% during supine ther exs from 92% in sitting. PATIENT LEVEL OF UNDERSTANDING OF EDUCATION: Good - good return demonstration of the ther exs. PLAN: Plan to review the HEP in anticipation of HHPT DC on the second visit of next week. DISCHARGE PLANNING DISCUSSED: Discharge to self and family under MD supervision once all goals have been met or patient has reached maximum potential. Discussed with the pt the POC to continue HHPT with the remaining frequency of 1 more visit this week then  2w1 with planned DC on the second visit of the week next week. The pt is in agreement to the POC at this time.

## 2023-09-18 ENCOUNTER — HOME CARE VISIT (OUTPATIENT)
Age: 83
End: 2023-09-18
Payer: MEDICARE

## 2023-09-18 PROCEDURE — G0158 HHC OT ASSISTANT EA 15: HCPCS

## 2023-09-19 VITALS
DIASTOLIC BLOOD PRESSURE: 78 MMHG | SYSTOLIC BLOOD PRESSURE: 134 MMHG | HEART RATE: 71 BPM | RESPIRATION RATE: 16 BRPM | TEMPERATURE: 97.6 F | OXYGEN SATURATION: 98 %

## 2023-09-20 ENCOUNTER — HOME CARE VISIT (OUTPATIENT)
Age: 83
End: 2023-09-20
Payer: MEDICARE

## 2023-09-21 ENCOUNTER — HOME CARE VISIT (OUTPATIENT)
Age: 83
End: 2023-09-21
Payer: MEDICARE

## 2023-09-21 VITALS
SYSTOLIC BLOOD PRESSURE: 138 MMHG | DIASTOLIC BLOOD PRESSURE: 78 MMHG | RESPIRATION RATE: 16 BRPM | HEART RATE: 61 BPM | TEMPERATURE: 97.4 F | OXYGEN SATURATION: 96 %

## 2023-09-21 PROCEDURE — G0151 HHCP-SERV OF PT,EA 15 MIN: HCPCS

## 2023-09-21 NOTE — HOME HEALTH
SUBJECTIVE: I am doing ok today   REQUIRES CAREGIVER ASSISTANCE FOR: transportation, IADLS   MEDICATIONS REVIEWED AND RECONCILED no changes   NEXT MD APPT:  10/11/23  ROM:B LE WFL   STRENGTH: B hip flexors, quads and hamstrings 5/5  WOUNDS:none  BED MOBILITY:supine<>sit NEEMA   TRANSFERS:sit to stand from bed and chair with and without B UE support MOD I  GAIT:Pt amb 200 ft on flat level surfaces with rollator walker on 3 Lo2 by nasal canula. MOD I decreased HB HS noted at intial contact. B feet do not consistently clear the floor during swin phase of gait. O2 sat was 95% after amb. No SOB noted. Pt refused amb outside today. Per PTA visit 9/12/23 The pt ambulated on level surfaces inside, declined outside ambulation, x 200 ft, mod(I) with SpO2 at 92% post ambulation. BALANCE: Pt scored 21/28 on Tinetti Balance Assessment placing pt at med risk for falls. PATIENT RESPONE TO TX: Pt pain level remained the same throughout tx session   PATIENT LEVEL OF UNDERSTANDING OF EDUCATION PROVIDED Pt demonstrated and verbalized use of rollator walker at all times when amb   PATIENT EDUCATION PROVIDED THIS VISIT: safety, HEP, walking, deep breathing,          HEP consisting of:  ) stand/walk every waking hour   2) daily walking program with CG and rollator walker outside the apartment   3) ther exs 2 times per day   seated: LAQ, hip flex x 15   sit to stand x 5   supine: heel slides, hip abd, SLR 2 x 10   standing: (with samara UE support): calf raises, R/L hip abd, R/L HS curls, marching, mini squats 2 x 10  Written HEP issued, patient/caregiver verbalized understanding. Patient is s/p chronic HF and has been treated for ROM, strengthening, gait training, stair training, HEP training, safety training, and balance training. On SOC strength  8/24/23 was  R hip flexors -4/5 R quads and hamstrings 4/5 R DF/PF +4/5  L hip flexors, quads, hamstrings  4/5 DF/PF +4. Today at AR strength is B hip flexors, quads and hamstrings

## 2023-09-26 ENCOUNTER — TELEPHONE (OUTPATIENT)
Age: 83
End: 2023-09-26

## 2023-09-26 NOTE — TELEPHONE ENCOUNTER
Patient is requesting  a cb regarding an order for a new concentrator dt last no longer working.  Contact# [phone]

## 2023-09-28 ENCOUNTER — HOME CARE VISIT (OUTPATIENT)
Age: 83
End: 2023-09-28
Payer: MEDICARE

## 2023-09-28 PROCEDURE — G0152 HHCP-SERV OF OT,EA 15 MIN: HCPCS

## 2023-09-29 VITALS
OXYGEN SATURATION: 95 % | DIASTOLIC BLOOD PRESSURE: 82 MMHG | RESPIRATION RATE: 17 BRPM | TEMPERATURE: 98.4 F | HEART RATE: 53 BPM | SYSTOLIC BLOOD PRESSURE: 145 MMHG

## 2023-09-29 NOTE — HOME HEALTH
Caregiver involvement: Pt has a PCA that provides distant S with daily tasks for pt comfort. Medications reviewed and all medications are available in the home this visit. The following education was provided regarding medications, medication interactions, and look alike medications (specify): Continue as directed by MD.    Medications  are effective at this time. Patient education provided this visit: see d/c summary    Sharps education provided:  na    Patient level of understanding of education provided: see d/c summary    Skilled Care Performed this visit: agency d/c    Patient response to procedure performed:  Mr. Edi Patel had a positive response to therapy. He denies having pain and vitals were with therapy parameters. Patient's Progress towards personal goals: Mr. Edi Patel has reached maximal potential with skilled OT and met all goals. He is independent with ADL and light IADL tasks. Home exercise program: B UE strengthening against gravity for shoulder flexion/extension, overhead press, chest press, ab/adduction and elbow flexion/extension    Continued need for the following skills: agency d/c    Discharge Plans:  DC OT. Maximal potential achieved with self care, mobility and light IADL tasks. MD office notified.

## 2023-09-29 NOTE — CASE COMMUNICATION
Occupational Therapy Discharge - Mr. Tavia Flores was seen by skilled OT for 4 visits to maximize his safety with daily tasks through use of energy conservation and appropriate use of DME. Mr. Tavia Flores is performing his own ADL and bathing at the shower level while seated. He denies needing assistance for grooming, bathing, dressing or toileting. Pt's PCA in agreement that she is there for his well being but has not needed to assist with his  daily routines. He is able to prepare light meals unassisted and denies having any recent falls. He is independent with transfers in the home to the the toilet, tub and bed. He uses the rollator for community mobility. No further skilled OT is indicated at this time. All goals met. Physical Therapy Discharge - note taken from case communication received on 9/21/23  Patient is s/p chronic HF and has been treated for ROM, strengt hening, gait training, stair training, HEP training, safety training, and balance training. On [facility] strength 8/24/23 was R hip flexors -4/5 R quads and hamstrings 4/5 R DF/PF +4/5 L hip flexors, quads, hamstrings 4/5 DF/PF +4. Today at CT strength is B hip flexors, quads and hamstrings 5/5. On [facility] bed mobility was supine<>sit MOD I. On [facility] transfers were sit to stand from bed, chair, commode with B UE support for push off with SBA increase d time needed to compete transfers . Today at CT transfers are sit to stand from bed and chair with and without B UE support MOD I On SOC gait was Pt amb 15 ft x 2 with RW with recipical gait pattern with SBA decreased B step length slow eleuterio B fee do not clear the floor during swing phase of gait. Pt amb with SBA/CGA Pt has a downward gaze pt amb on 3LO2 sat decreased 80% with 70 HR after 2.5 min O2 sat increaaed to 93% with HR of 7 0. Educated pt in pursed lip breathing technqiue. Today at CT gait is :Pt amb 200 ft on flat level surfaces with rollator walker on 3 Lo2 by nasal canula.  MOD I decreased HB HS noted at

## 2023-10-02 RX ORDER — FINASTERIDE 5 MG/1
TABLET, FILM COATED ORAL
Qty: 90 TABLET | Refills: 1 | Status: SHIPPED | OUTPATIENT
Start: 2023-10-02

## 2023-10-20 DIAGNOSIS — E55.9 VITAMIN D DEFICIENCY: ICD-10-CM

## 2023-10-20 RX ORDER — ERGOCALCIFEROL 1.25 MG/1
50000 CAPSULE ORAL WEEKLY
Qty: 12 CAPSULE | Refills: 1 | Status: SHIPPED | OUTPATIENT
Start: 2023-10-20

## 2023-10-25 NOTE — TELEPHONE ENCOUNTER
Patient is just calling in to update his pharmacy information.   Will not longer be using [facility], has changed to [facility] on [address]
4 = No assist / stand by assistance

## 2023-11-29 ENCOUNTER — TELEPHONE (OUTPATIENT)
Facility: CLINIC | Age: 83
End: 2023-11-29

## 2023-11-29 NOTE — TELEPHONE ENCOUNTER
----- Message from Alyssa Hadley sent at 11/28/2023  3:32 PM EST -----  Subject: Message to Provider    QUESTIONS  Information for Provider? Pt called in regards to speak with provider in   regards to get a lighter oxygen tank for him to carry around . Pt is also   needing prescription for Pulmonologist so he can get it fax to where his   daughter lives . Pt is asking for someone to reach out to sister in this   regards to discuss this . [phone] sister Sonnymohit Susan marj Chatmanbonny Maco call   sister in this regards   ---------------------------------------------------------------------------  --------------  Seymour Leslie Kayleen  [phone]; OK to leave message on voicemail  ---------------------------------------------------------------------------  --------------  SCRIPT ANSWERS  Relationship to Patient? Other/Third Party  Representative Name? sister Emanuel    Is the representative on the Communication Release of Information (RACHEAL)   form in Epic?  Yes

## 2023-11-29 NOTE — TELEPHONE ENCOUNTER
----- Message from Sarahmohit May sent at 11/28/2023  3:21 PM EST -----  Subject: Referral Request    Reason for referral request? Pt is needing referral to Pulmonary   Provider patient wants to be referred to(if known):     Provider Phone Number(if known): Additional Information for Provider?  Please reach out   ---------------------------------------------------------------------------  --------------  Chris Barnes INFO    [phone]; OK to leave message on voicemail  ---------------------------------------------------------------------------  --------------

## 2023-12-14 ENCOUNTER — TELEPHONE (OUTPATIENT)
Age: 83
End: 2023-12-14

## 2023-12-14 NOTE — TELEPHONE ENCOUNTER
Pt's caretaker called, stating that the daughter has questions about his oxygen and traveling to [place].  We only have the sister, Kathie, as someone to speak with.  Please call Kathie at [phone].

## 2023-12-26 ENCOUNTER — TELEPHONE (OUTPATIENT)
Age: 83
End: 2023-12-26

## 2023-12-26 NOTE — TELEPHONE ENCOUNTER
Pt caretaker, Sol, called stating that pt will be visiting daughter in [facility] and they have questions about his oxygen. Requesting call to pt to go over questions.  [phone]

## 2024-01-05 ENCOUNTER — OFFICE VISIT (OUTPATIENT)
Age: 84
End: 2024-01-05
Payer: MEDICARE

## 2024-01-05 VITALS
OXYGEN SATURATION: 90 % | DIASTOLIC BLOOD PRESSURE: 72 MMHG | HEART RATE: 54 BPM | BODY MASS INDEX: 26.5 KG/M2 | HEIGHT: 62 IN | SYSTOLIC BLOOD PRESSURE: 133 MMHG | WEIGHT: 144 LBS

## 2024-01-05 DIAGNOSIS — I25.10 ATHEROSCLEROSIS OF NATIVE CORONARY ARTERY WITHOUT ANGINA PECTORIS, UNSPECIFIED WHETHER NATIVE OR TRANSPLANTED HEART: ICD-10-CM

## 2024-01-05 DIAGNOSIS — I48.0 PAROXYSMAL ATRIAL FIBRILLATION (HCC): Primary | ICD-10-CM

## 2024-01-05 DIAGNOSIS — I10 ESSENTIAL (PRIMARY) HYPERTENSION: ICD-10-CM

## 2024-01-05 DIAGNOSIS — I50.9 HEART FAILURE, UNSPECIFIED HF CHRONICITY, UNSPECIFIED HEART FAILURE TYPE (HCC): ICD-10-CM

## 2024-01-05 PROCEDURE — 1123F ACP DISCUSS/DSCN MKR DOCD: CPT | Performed by: INTERNAL MEDICINE

## 2024-01-05 PROCEDURE — 99215 OFFICE O/P EST HI 40 MIN: CPT | Performed by: INTERNAL MEDICINE

## 2024-01-05 PROCEDURE — 3078F DIAST BP <80 MM HG: CPT | Performed by: INTERNAL MEDICINE

## 2024-01-05 PROCEDURE — 93000 ELECTROCARDIOGRAM COMPLETE: CPT | Performed by: INTERNAL MEDICINE

## 2024-01-05 PROCEDURE — 3075F SYST BP GE 130 - 139MM HG: CPT | Performed by: INTERNAL MEDICINE

## 2024-01-05 ASSESSMENT — PATIENT HEALTH QUESTIONNAIRE - PHQ9
2. FEELING DOWN, DEPRESSED OR HOPELESS: 0
SUM OF ALL RESPONSES TO PHQ9 QUESTIONS 1 & 2: 0
1. LITTLE INTEREST OR PLEASURE IN DOING THINGS: 0
SUM OF ALL RESPONSES TO PHQ QUESTIONS 1-9: 0

## 2024-01-05 NOTE — PROGRESS NOTES
Edinson Mcclure presents today for   Chief Complaint   Patient presents with    Follow-up    Shortness of Breath     Comes and goes anytime        Edinson Mcclure preferred language for health care discussion is english/other.    Is someone accompanying this pt?   yes  Is the patient using any DME equipment during OV? POC    Depression Screening:  Depression: Not at risk (1/5/2024)    PHQ-2     PHQ-2 Score: 0        Learning Assessment:  Who is the primary learner? Patient    What is the preferred language for health care of the primary learner? ENGLISH    How does the primary learner prefer to learn new concepts? DEMONSTRATION    Answered By patient    Relationship to Learner SELF           Pt currently taking Anticoagulant therapy? no    Pt currently taking Antiplatelet therapy ? ASA 81 mg       Coordination of Care:  1. Have you been to the ER, urgent care clinic since your last visit? Hospitalized since your last visit? no    2. Have you seen or consulted any other health care providers outside of the [facility] since your last visit? Include any pap smears or colon screening. no    
02/23/2023    bilateral, mild    Other ill-defined conditions(799.89)     gout    PAF (paroxysmal atrial fibrillation) (HCC)     Pulmonary hypertension (HCC)     Rectal polyp     Right renal mass     Schizophrenia (HCC)     Senile nuclear cataract 05/04/2022    Stopped smoking with greater than 10 pack year history     Vitamin D deficiency     Vitreous floaters, bilateral 05/04/2022    Weight loss, unintentional        Past Surgical History:   Procedure Laterality Date    COLONOSCOPY  04/17/2013    Dr. LEFTY Mcdaniels    ORTHOPEDIC SURGERY  1970's    wound repair L. arm    TURP  03/01/2017     TRANSURETHRAL RESECTION OF PROSTATE;  Surgeon: Naresh Schumacher MD; [facility]    UPPER GASTROINTESTINAL ENDOSCOPY         Current Outpatient Medications   Medication Sig Dispense Refill    finasteride (PROSCAR) 5 MG tablet TAKE 1 TABLET BY MOUTH EVERY DAY 90 tablet 1    amiodarone (PACERONE) 100 MG tablet TAKE 1 TABLET BY MOUTH EVERY DAY 90 tablet 2    tamsulosin (FLOMAX) 0.4 MG capsule TAKE 1 CAPSULE BY MOUTH EVERY DAY 90 capsule 1    furosemide (LASIX) 20 MG tablet TAKE 1 TABLET BY MOUTH EVERY DAY 90 tablet 1    hydrALAZINE (APRESOLINE) 50 MG tablet Take 1 tablet by mouth 3 times daily      sodium chloride 1 g tablet Take 1 tablet by mouth daily      acetaminophen (TYLENOL) 325 MG tablet Take 2 tablets by mouth every 6 hours as needed for Pain      atorvastatin (LIPITOR) 40 MG tablet TAKE 1 TABLET BY MOUTH EVERY DAY 90 tablet 4    amLODIPine (NORVASC) 10 MG tablet TAKE 1 TABLET BY MOUTH EVERY DAY 90 tablet 3    potassium chloride (KLOR-CON) 10 MEQ extended release tablet TAKE 1 TABLET BY MOUTH EVERY DAY 90 tablet 3    isosorbide mononitrate (IMDUR) 30 MG extended release tablet TAKE 1 TABLET BY MOUTH EVERY DAY. PLEASE SCHEDULE FOLLOW UP WITH DOCTOR INDICATIONS: PREVENTION OF ANGINAL CHEST PAIN ASSOCIATED WITH CORONARY ARTERY DISEASE 90 tablet 3    magnesium oxide (MAG-OX) 400 MG tablet TAKE 1 TABLET BY MOUTH IN THE

## 2024-02-12 NOTE — TELEPHONE ENCOUNTER
I have spoke with the patient re his oxygen. He states he purchased an inogen machine and is using 3 L. He is questioning if it looses charge while he is out. I have advised either buy a second battery which he does not want to or bring  wherever he goes to charge.   He has appt 3/8/24 and needs refills on Anoro and Albuterol until

## 2024-02-12 NOTE — TELEPHONE ENCOUNTER
Pt caregiver (Sol)called in stating pt needs refills on inhalers ANORA and albuterol. Pt current pharmacy is [facility] on [facility]. Pt is ariel'd for PFT in March per last visit note from Dr. CANTU in 2023, unable to ariel f/u due to her ariel. Pls call pt, he is concerned about his portable oxygen running out. Pls call pt at [phone]

## 2024-02-13 RX ORDER — ALBUTEROL SULFATE 90 UG/1
AEROSOL, METERED RESPIRATORY (INHALATION)
Qty: 18 G | Refills: 2 | Status: SHIPPED | OUTPATIENT
Start: 2024-02-13

## 2024-02-13 RX ORDER — UMECLIDINIUM BROMIDE AND VILANTEROL TRIFENATATE 62.5; 25 UG/1; UG/1
1 POWDER RESPIRATORY (INHALATION) DAILY
Qty: 1 EACH | Refills: 2 | Status: SHIPPED | OUTPATIENT
Start: 2024-02-13

## 2024-05-06 DIAGNOSIS — Z76.0 ENCOUNTER FOR ISSUE OF REPEAT PRESCRIPTION: ICD-10-CM

## 2024-05-06 RX ORDER — FUROSEMIDE 20 MG/1
TABLET ORAL
Qty: 90 TABLET | Refills: 1 | Status: SHIPPED | OUTPATIENT
Start: 2024-05-06

## 2024-05-06 NOTE — TELEPHONE ENCOUNTER
Call placed to the pt regarding to OV is overdue. Pt's PCA states she will notify the pt's family regarding an appointment is needed.

## 2024-05-15 ENCOUNTER — OFFICE VISIT (OUTPATIENT)
Facility: CLINIC | Age: 84
End: 2024-05-15
Payer: MEDICARE

## 2024-05-15 VITALS
TEMPERATURE: 97.4 F | HEART RATE: 67 BPM | OXYGEN SATURATION: 91 % | DIASTOLIC BLOOD PRESSURE: 74 MMHG | SYSTOLIC BLOOD PRESSURE: 117 MMHG | BODY MASS INDEX: 26.62 KG/M2 | WEIGHT: 141 LBS | HEIGHT: 61 IN | RESPIRATION RATE: 22 BRPM

## 2024-05-15 DIAGNOSIS — E11.22 TYPE 2 DIABETES MELLITUS WITH STAGE 3 CHRONIC KIDNEY DISEASE, WITHOUT LONG-TERM CURRENT USE OF INSULIN, UNSPECIFIED WHETHER STAGE 3A OR 3B CKD (HCC): ICD-10-CM

## 2024-05-15 DIAGNOSIS — J43.9 PULMONARY EMPHYSEMA, UNSPECIFIED EMPHYSEMA TYPE (HCC): ICD-10-CM

## 2024-05-15 DIAGNOSIS — E78.5 HYPERLIPIDEMIA, UNSPECIFIED HYPERLIPIDEMIA TYPE: ICD-10-CM

## 2024-05-15 DIAGNOSIS — Z99.81 DEPENDENCE ON SUPPLEMENTAL OXYGEN: ICD-10-CM

## 2024-05-15 DIAGNOSIS — Z00.00 MEDICARE ANNUAL WELLNESS VISIT, SUBSEQUENT: Primary | ICD-10-CM

## 2024-05-15 DIAGNOSIS — I10 ESSENTIAL (PRIMARY) HYPERTENSION: ICD-10-CM

## 2024-05-15 DIAGNOSIS — E55.9 VITAMIN D DEFICIENCY: ICD-10-CM

## 2024-05-15 DIAGNOSIS — N18.30 TYPE 2 DIABETES MELLITUS WITH STAGE 3 CHRONIC KIDNEY DISEASE, WITHOUT LONG-TERM CURRENT USE OF INSULIN, UNSPECIFIED WHETHER STAGE 3A OR 3B CKD (HCC): ICD-10-CM

## 2024-05-15 PROCEDURE — G0439 PPPS, SUBSEQ VISIT: HCPCS | Performed by: INTERNAL MEDICINE

## 2024-05-15 PROCEDURE — 1123F ACP DISCUSS/DSCN MKR DOCD: CPT | Performed by: INTERNAL MEDICINE

## 2024-05-15 PROCEDURE — 3074F SYST BP LT 130 MM HG: CPT | Performed by: INTERNAL MEDICINE

## 2024-05-15 PROCEDURE — 99214 OFFICE O/P EST MOD 30 MIN: CPT | Performed by: INTERNAL MEDICINE

## 2024-05-15 PROCEDURE — 3078F DIAST BP <80 MM HG: CPT | Performed by: INTERNAL MEDICINE

## 2024-05-15 SDOH — ECONOMIC STABILITY: FOOD INSECURITY: WITHIN THE PAST 12 MONTHS, YOU WORRIED THAT YOUR FOOD WOULD RUN OUT BEFORE YOU GOT MONEY TO BUY MORE.: NEVER TRUE

## 2024-05-15 SDOH — ECONOMIC STABILITY: HOUSING INSECURITY
IN THE LAST 12 MONTHS, WAS THERE A TIME WHEN YOU DID NOT HAVE A STEADY PLACE TO SLEEP OR SLEPT IN A SHELTER (INCLUDING NOW)?: NO

## 2024-05-15 SDOH — ECONOMIC STABILITY: FOOD INSECURITY: WITHIN THE PAST 12 MONTHS, THE FOOD YOU BOUGHT JUST DIDN'T LAST AND YOU DIDN'T HAVE MONEY TO GET MORE.: NEVER TRUE

## 2024-05-15 SDOH — ECONOMIC STABILITY: INCOME INSECURITY: HOW HARD IS IT FOR YOU TO PAY FOR THE VERY BASICS LIKE FOOD, HOUSING, MEDICAL CARE, AND HEATING?: NOT HARD AT ALL

## 2024-05-15 ASSESSMENT — PATIENT HEALTH QUESTIONNAIRE - PHQ9
SUM OF ALL RESPONSES TO PHQ QUESTIONS 1-9: 0
1. LITTLE INTEREST OR PLEASURE IN DOING THINGS: NOT AT ALL
SUM OF ALL RESPONSES TO PHQ QUESTIONS 1-9: 0
SUM OF ALL RESPONSES TO PHQ QUESTIONS 1-9: 0
SUM OF ALL RESPONSES TO PHQ9 QUESTIONS 1 & 2: 0
SUM OF ALL RESPONSES TO PHQ QUESTIONS 1-9: 0
2. FEELING DOWN, DEPRESSED OR HOPELESS: NOT AT ALL

## 2024-05-15 ASSESSMENT — ENCOUNTER SYMPTOMS
COUGH: 0
SHORTNESS OF BREATH: 1

## 2024-05-15 ASSESSMENT — LIFESTYLE VARIABLES
HOW MANY STANDARD DRINKS CONTAINING ALCOHOL DO YOU HAVE ON A TYPICAL DAY: PATIENT DOES NOT DRINK
HOW OFTEN DO YOU HAVE A DRINK CONTAINING ALCOHOL: NEVER

## 2024-05-15 NOTE — PROGRESS NOTES
Medicare Annual Wellness Visit    Edinson Mcclure is here for Medicare AWV, Hypertension, and Cholesterol Problem    Assessment & Plan   Medicare annual wellness visit, subsequent    Recommendations for Preventive Services Due: see orders and patient instructions/AVS.  Recommended screening schedule for the next 5-10 years is provided to the patient in written form: see Patient Instructions/AVS.     No follow-ups on file.     Subjective       Patient's complete Health Risk Assessment and screening values have been reviewed and are found in Flowsheets. The following problems were reviewed today and where indicated follow up appointments were made and/or referrals ordered.    Positive Risk Factor Screenings with Interventions:    Fall Risk:  Do you feel unsteady or are you worried about falling? : (!) yes  2 or more falls in past year?: no  Fall with injury in past year?: no     Interventions:    Reviewed medications, home hazards, visual acuity, and co-morbidities that can increase risk for falls  See AVS for additional education material    Cognitive:   Clock Drawing Test (CDT): (!) Abnormal  Words recalled: 1 Word Recalled  Total Score: (!) 1  Total Score Interpretation: Abnormal Mini-Cog  Interventions:  See AVS for additional education material              Hearing Screen:  Do you or your family notice any trouble with your hearing that hasn't been managed with hearing aids?: (!) Yes    Interventions:  See AVS for additional education material    Vision Screen:  Do you have difficulty driving, watching TV, or doing any of your daily activities because of your eyesight?: No  Have you had an eye exam within the past year?: (!) No  No results found.    Interventions:   See AVS for additional education material     ADL's:   Patient reports needing help with:  Select all that apply: (!) Dressing, Bathing, Toileting, Walking/Balance  Select all that apply: (!) Laundry, Housekeeping, Banking/Finances, Shopping, 
lower leg: No edema.      Left lower leg: No edema.   Skin:     General: Skin is warm and dry.   Neurological:      General: No focal deficit present.      Mental Status: He is alert and oriented to person, place, and time.   Psychiatric:         Mood and Affect: Mood normal.         Behavior: Behavior normal.         ASSESSMENT and PLAN      ICD-10-CM             2. Essential (primary) hypertension  I10 Basic Metabolic Panel     Lipid Panel      3. Hyperlipidemia, unspecified hyperlipidemia type  E78.5 Lipid Panel      4. Type 2 diabetes mellitus with stage 3 chronic kidney disease, without long-term current use of insulin, unspecified whether stage 3a or 3b CKD ([facility])  E11.22 Basic Metabolic Panel    N18.30 Hemoglobin A1C     Lipid Panel      5. Vitamin D deficiency  E55.9 Vitamin D 25 Hydroxy      6. Pulmonary emphysema, unspecified emphysema type ([facility])  J43.9       7. Dependence on supplemental oxygen  Z99.81            BP controlled.    Continue same medications and doses    DM has been controlled.    Continue same medications and doses pending lab results    Vitamin D deficiency. Recheck    COPD on oxygen. Needs to f/u with pulmonary    F/u with cardiology as appt'd in July    Update lab today    F/u 6 months

## 2024-05-16 LAB
25(OH)D3+25(OH)D2 SERPL-MCNC: 32.5 NG/ML (ref 30–100)
BUN SERPL-MCNC: 17 MG/DL (ref 8–27)
BUN/CREAT SERPL: 17 (ref 10–24)
CALCIUM SERPL-MCNC: 9.1 MG/DL (ref 8.6–10.2)
CHLORIDE SERPL-SCNC: 107 MMOL/L (ref 96–106)
CHOLEST SERPL-MCNC: 112 MG/DL (ref 100–199)
CO2 SERPL-SCNC: 18 MMOL/L (ref 20–29)
CREAT SERPL-MCNC: 1.02 MG/DL (ref 0.76–1.27)
EGFRCR SERPLBLD CKD-EPI 2021: 72 ML/MIN/1.73
GLUCOSE SERPL-MCNC: 140 MG/DL (ref 70–99)
HBA1C MFR BLD: 6 % (ref 4.8–5.6)
HDLC SERPL-MCNC: 41 MG/DL
LDLC SERPL CALC-MCNC: 57 MG/DL (ref 0–99)
POTASSIUM SERPL-SCNC: 4.7 MMOL/L (ref 3.5–5.2)
SODIUM SERPL-SCNC: 143 MMOL/L (ref 134–144)
TRIGL SERPL-MCNC: 65 MG/DL (ref 0–149)
VLDLC SERPL CALC-MCNC: 14 MG/DL (ref 5–40)

## 2024-05-24 ENCOUNTER — TELEPHONE (OUTPATIENT)
Facility: CLINIC | Age: 84
End: 2024-05-24

## 2024-05-24 NOTE — TELEPHONE ENCOUNTER
Deanne martinez in home care is inquiring if we have received a Physicians order for the patient they faxed yesterday?

## 2024-05-24 NOTE — TELEPHONE ENCOUNTER
Called and informed staff we received the DME supply order for rollator/walker. Staf made aware the PCP will return next week to address this request.    No further concerns voiced at this time.

## 2024-06-04 ENCOUNTER — OFFICE VISIT (OUTPATIENT)
Facility: CLINIC | Age: 84
End: 2024-06-04
Payer: MEDICARE

## 2024-06-04 VITALS
SYSTOLIC BLOOD PRESSURE: 132 MMHG | RESPIRATION RATE: 16 BRPM | BODY MASS INDEX: 21.71 KG/M2 | TEMPERATURE: 97.1 F | OXYGEN SATURATION: 90 % | WEIGHT: 115 LBS | HEIGHT: 61 IN | HEART RATE: 72 BPM | DIASTOLIC BLOOD PRESSURE: 74 MMHG

## 2024-06-04 DIAGNOSIS — H66.93 OTITIS OF BOTH EARS: ICD-10-CM

## 2024-06-04 DIAGNOSIS — H91.93 BILATERAL HEARING LOSS, UNSPECIFIED HEARING LOSS TYPE: Primary | ICD-10-CM

## 2024-06-04 DIAGNOSIS — Z74.09 IMPAIRED MOBILITY: ICD-10-CM

## 2024-06-04 DIAGNOSIS — R63.4 WEIGHT LOSS: ICD-10-CM

## 2024-06-04 PROCEDURE — 1123F ACP DISCUSS/DSCN MKR DOCD: CPT | Performed by: INTERNAL MEDICINE

## 2024-06-04 PROCEDURE — 3075F SYST BP GE 130 - 139MM HG: CPT | Performed by: INTERNAL MEDICINE

## 2024-06-04 PROCEDURE — G2211 COMPLEX E/M VISIT ADD ON: HCPCS | Performed by: INTERNAL MEDICINE

## 2024-06-04 PROCEDURE — 99214 OFFICE O/P EST MOD 30 MIN: CPT | Performed by: INTERNAL MEDICINE

## 2024-06-04 PROCEDURE — 3078F DIAST BP <80 MM HG: CPT | Performed by: INTERNAL MEDICINE

## 2024-06-04 RX ORDER — AMOXICILLIN AND CLAVULANATE POTASSIUM 875; 125 MG/1; MG/1
1 TABLET, FILM COATED ORAL 2 TIMES DAILY
Qty: 20 TABLET | Refills: 0 | Status: SHIPPED | OUTPATIENT
Start: 2024-06-04 | End: 2024-06-14

## 2024-06-04 RX ORDER — PREDNISONE 20 MG/1
20 TABLET ORAL DAILY
Qty: 7 TABLET | Refills: 0 | Status: SHIPPED | OUTPATIENT
Start: 2024-06-04 | End: 2024-06-11

## 2024-06-04 ASSESSMENT — PATIENT HEALTH QUESTIONNAIRE - PHQ9
1. LITTLE INTEREST OR PLEASURE IN DOING THINGS: NOT AT ALL
SUM OF ALL RESPONSES TO PHQ QUESTIONS 1-9: 0
SUM OF ALL RESPONSES TO PHQ QUESTIONS 1-9: 0
2. FEELING DOWN, DEPRESSED OR HOPELESS: NOT AT ALL
SUM OF ALL RESPONSES TO PHQ QUESTIONS 1-9: 0
SUM OF ALL RESPONSES TO PHQ QUESTIONS 1-9: 0
SUM OF ALL RESPONSES TO PHQ9 QUESTIONS 1 & 2: 0

## 2024-06-04 ASSESSMENT — ENCOUNTER SYMPTOMS
FACIAL SWELLING: 0
TROUBLE SWALLOWING: 0
SINUS PRESSURE: 0
VOICE CHANGE: 0

## 2024-06-04 NOTE — PROGRESS NOTES
\"Have you been to the ER, urgent care clinic since your last visit?  Hospitalized since your last visit?\"    NO    “Have you seen or consulted any other health care providers outside of [facility] since your last visit?”    NO            Click Here for Release of Records Request  
not deviate from midline.      Palate: No mass.      Comments: Unable to see the pharynx well  Neurological:      Mental Status: He is alert.         ASSESSMENT and PLAN      ICD-10-CM    1. Bilateral hearing loss, unspecified hearing loss type  H91.93 External Referral To ENT     predniSONE (DELTASONE) 20 MG tablet     amoxicillin-clavulanate (AUGMENTIN) 875-125 MG per tablet      2. Otitis of both ears  H66.93 External Referral To ENT     predniSONE (DELTASONE) 20 MG tablet     amoxicillin-clavulanate (AUGMENTIN) 875-125 MG per tablet     CBC with Auto Differential     CBC with Auto Differential      3. Impaired mobility  Z74.09 DME Order for (Specify) as OP      4. Weight loss  R63.4 TSH + Free T4 Panel     CBC with Auto Differential     CBC with Auto Differential     TSH + Free T4 Panel          ? Otitis media with cholesteatoma?  Will treat with steroids and antibiotics.  Will also refer to ENT.  The name and phone number to [facility] was provided to the caretaker as they would like to call to see if he can be seen.    I am concerned about his weight loss.  He looks very frail today.  In April of this year he had a CTA which did not detect anything unusual in the lungs.  In February he had a CTA of the chest and abdomen neither of which picked up anything significant.  As well as the CT scan itself of the abdomen and pelvis.  He had lab which included a basic metabolic panel, hemoglobin A1c, lipid panel, vitamin D in May which were unremarkable.  He has not had a recent thyroid test so we will obtain this.    The caretaker says he eats well and eats whatever they are able to give him.  It does have to be chopped up to make it easier for him to chew and swallow.  But he denies any difficulty swallowing.  He denies any abdominal pain.  He has his baseline shortness of breath.    They are requesting an order for raised toilet seat adapter.    F/u 7-10 days for recheck on ears, hearing loss, weight loss

## 2024-06-06 LAB
BASOPHILS # BLD AUTO: 0.1 X10E3/UL (ref 0–0.2)
BASOPHILS NFR BLD AUTO: 1 %
EOSINOPHIL # BLD AUTO: 0.1 X10E3/UL (ref 0–0.4)
EOSINOPHIL NFR BLD AUTO: 1 %
ERYTHROCYTE [DISTWIDTH] IN BLOOD BY AUTOMATED COUNT: 23.8 % (ref 11.6–15.4)
HCT VFR BLD AUTO: 38.4 % (ref 37.5–51)
HGB BLD-MCNC: 11.9 G/DL (ref 13–17.7)
IMM GRANULOCYTES # BLD AUTO: 0 X10E3/UL (ref 0–0.1)
IMM GRANULOCYTES NFR BLD AUTO: 0 %
LYMPHOCYTES # BLD AUTO: 1.4 X10E3/UL (ref 0.7–3.1)
LYMPHOCYTES NFR BLD AUTO: 20 %
MCH RBC QN AUTO: 20.9 PG (ref 26.6–33)
MCHC RBC AUTO-ENTMCNC: 31 G/DL (ref 31.5–35.7)
MCV RBC AUTO: 68 FL (ref 79–97)
MONOCYTES # BLD AUTO: 0.7 X10E3/UL (ref 0.1–0.9)
MONOCYTES NFR BLD AUTO: 10 %
MORPHOLOGY BLD-IMP: ABNORMAL
NEUTROPHILS # BLD AUTO: 4.8 X10E3/UL (ref 1.4–7)
NEUTROPHILS NFR BLD AUTO: 68 %
NRBC BLD AUTO-RTO: 1 % (ref 0–0)
PLATELET # BLD AUTO: 308 X10E3/UL (ref 150–450)
RBC # BLD AUTO: 5.69 X10E6/UL (ref 4.14–5.8)
WBC # BLD AUTO: 7.1 X10E3/UL (ref 3.4–10.8)

## 2024-06-07 LAB
T4 FREE SERPL-MCNC: 1.83 NG/DL (ref 0.82–1.77)
TSH SERPL DL<=0.005 MIU/L-ACNC: 5.1 UIU/ML (ref 0.45–4.5)

## 2024-06-12 ENCOUNTER — OFFICE VISIT (OUTPATIENT)
Facility: CLINIC | Age: 84
End: 2024-06-12
Payer: MEDICARE

## 2024-06-12 VITALS
WEIGHT: 111 LBS | TEMPERATURE: 97.2 F | HEIGHT: 61 IN | OXYGEN SATURATION: 68 % | HEART RATE: 59 BPM | BODY MASS INDEX: 20.96 KG/M2 | RESPIRATION RATE: 22 BRPM

## 2024-06-12 DIAGNOSIS — R63.4 ABNORMAL WEIGHT LOSS: ICD-10-CM

## 2024-06-12 DIAGNOSIS — R06.09 OTHER FORM OF DYSPNEA: ICD-10-CM

## 2024-06-12 DIAGNOSIS — R69 ILLNESS, UNSPECIFIED: ICD-10-CM

## 2024-06-12 DIAGNOSIS — R09.02 HYPOXIA: Primary | ICD-10-CM

## 2024-06-12 DIAGNOSIS — R94.6 ABNORMAL THYROID FUNCTION TEST: ICD-10-CM

## 2024-06-12 PROCEDURE — 1123F ACP DISCUSS/DSCN MKR DOCD: CPT | Performed by: INTERNAL MEDICINE

## 2024-06-12 PROCEDURE — G2211 COMPLEX E/M VISIT ADD ON: HCPCS | Performed by: INTERNAL MEDICINE

## 2024-06-12 PROCEDURE — 99215 OFFICE O/P EST HI 40 MIN: CPT | Performed by: INTERNAL MEDICINE

## 2024-06-12 ASSESSMENT — PATIENT HEALTH QUESTIONNAIRE - PHQ9
SUM OF ALL RESPONSES TO PHQ9 QUESTIONS 1 & 2: 0
SUM OF ALL RESPONSES TO PHQ QUESTIONS 1-9: 0
SUM OF ALL RESPONSES TO PHQ QUESTIONS 1-9: 0
1. LITTLE INTEREST OR PLEASURE IN DOING THINGS: NOT AT ALL
SUM OF ALL RESPONSES TO PHQ QUESTIONS 1-9: 0
SUM OF ALL RESPONSES TO PHQ QUESTIONS 1-9: 0
2. FEELING DOWN, DEPRESSED OR HOPELESS: NOT AT ALL

## 2024-06-12 ASSESSMENT — ENCOUNTER SYMPTOMS: SHORTNESS OF BREATH: 1

## 2024-06-12 NOTE — PROGRESS NOTES
\"Have you been to the ER, urgent care clinic since your last visit?  Hospitalized since your last visit?\"    NO    “Have you seen or consulted any other health care providers outside of [facility] since your last visit?”    NO    This patient is accompanied in the office by his his PCA, Abril.    PCP notified of the pt's vital and physical appearance.     Vitals:    06/12/24 1158 06/12/24 1207 06/12/24 1221 06/12/24 1225   BP: Comment: w/ meds      Site: Right Upper Arm      Position: Sitting      Cuff Size: Small Adult      Pulse: 59 (!) 47 62 59   Resp: 22      Temp: 97.2 °F (36.2 °C)      TempSrc: Temporal      SpO2: (!) 60%  Comment: on 4L/min (!) 82%  Comment: 4L/min (!) 70%  Comment: on 4L/min (!) 68%  Comment: 4L/min   Weight: 50.3 kg (111 lb)      Height: 1.543 m (5' 0.75\")             EMS initiated.         Click Here for Release of Records Request

## 2024-06-12 NOTE — PROGRESS NOTES
HISTORY OF PRESENT ILLNESS      Edinson Mcclure is a 84 y.o. male.    Pulse 59   Temp 97.2 °F (36.2 °C) (Temporal)   Resp 22   Ht 1.543 m (5' 0.75\")   Wt 50.3 kg (111 lb)   SpO2 (!) 68% Comment: 4L/min  BMI 21.15 kg/m²         Pt in the office for f/u on his ear but I never got to assess it   Has lost additional weight  O2 is low. He is using some accessory muscles to breathe  His left lung base sounds are diminished  HR is irregular    I had planned to discuss the off TSH and T4 but at this time I am advising aide to take him to the ER for an evaluation.      Weight Loss  This is a chronic problem. The current episode started more than 1 month ago.   Shortness of Breath  This is a new problem. The problem occurs constantly.       Review of Systems   Reason unable to perform ROS: pt has schizophrenia and history is difficult to obtain from him.   Constitutional:  Positive for activity change, unexpected weight change and weight loss. Negative for appetite change.   Respiratory:  Positive for shortness of breath.            Physical Exam  Constitutional:       General: He is in acute distress.      Appearance: He is ill-appearing.   HENT:      Head: Normocephalic.   Cardiovascular:      Rate and Rhythm: Bradycardia present. Rhythm irregular.   Pulmonary:      Comments: some accessory muscle use  Decreased BS left lower lung field  Skin:     Comments: Cool and clammy   Neurological:      Mental Status: He is alert. He is disoriented.   Psychiatric:      Comments: Answers simple questions. May be slower today in thought processes.  Always slow verbally              Pulse 59   Temp 97.2 °F (36.2 °C) (Temporal)   Resp 22   Ht 1.543 m (5' 0.75\")   Wt 50.3 kg (111 lb)   SpO2 (!) 68% Comment: 4L/min  BMI 21.15 kg/m²           Past Medical History:   Diagnosis Date    Adrenal myelolipoma     Benign non-nodular prostatic hyperplasia with lower urinary tract symptoms     Beta thalassemia trait     Blepharitis

## 2024-06-27 ENCOUNTER — TELEPHONE (OUTPATIENT)
Facility: CLINIC | Age: 84
End: 2024-06-27

## 2024-06-27 NOTE — TELEPHONE ENCOUNTER
Patient care taker is calling in to have Ensure Rx sent in to home care delivered please submit request

## 2024-06-28 NOTE — TELEPHONE ENCOUNTER
Spoke with pt/pt's caregiver, Abril in regards to Ensure request. Two patient identifier's verified.  Relayed the PCP's note. Pt's caregiver states drinking two bottles a day. The Pt appetite has decreased. Pt is  eating once a day. All food is now chopped due to it is difficult for him to swallow. PCP notified.

## 2024-07-05 ENCOUNTER — OFFICE VISIT (OUTPATIENT)
Age: 84
End: 2024-07-05
Payer: MEDICARE

## 2024-07-05 VITALS
OXYGEN SATURATION: 93 % | HEART RATE: 56 BPM | SYSTOLIC BLOOD PRESSURE: 120 MMHG | DIASTOLIC BLOOD PRESSURE: 62 MMHG | WEIGHT: 115 LBS | HEIGHT: 60 IN | BODY MASS INDEX: 22.58 KG/M2

## 2024-07-05 DIAGNOSIS — R00.2 PALPITATIONS: Primary | ICD-10-CM

## 2024-07-05 DIAGNOSIS — R00.2 PALPITATIONS: ICD-10-CM

## 2024-07-05 PROCEDURE — 99214 OFFICE O/P EST MOD 30 MIN: CPT | Performed by: INTERNAL MEDICINE

## 2024-07-05 PROCEDURE — 3074F SYST BP LT 130 MM HG: CPT | Performed by: INTERNAL MEDICINE

## 2024-07-05 PROCEDURE — 1123F ACP DISCUSS/DSCN MKR DOCD: CPT | Performed by: INTERNAL MEDICINE

## 2024-07-05 PROCEDURE — 3078F DIAST BP <80 MM HG: CPT | Performed by: INTERNAL MEDICINE

## 2024-07-05 ASSESSMENT — PATIENT HEALTH QUESTIONNAIRE - PHQ9
2. FEELING DOWN, DEPRESSED OR HOPELESS: NOT AT ALL
SUM OF ALL RESPONSES TO PHQ9 QUESTIONS 1 & 2: 0
SUM OF ALL RESPONSES TO PHQ QUESTIONS 1-9: 0
1. LITTLE INTEREST OR PLEASURE IN DOING THINGS: NOT AT ALL
SUM OF ALL RESPONSES TO PHQ QUESTIONS 1-9: 0

## 2024-07-05 NOTE — PROGRESS NOTES
Edinson Mcclure presents today for   Chief Complaint   Patient presents with    Follow-up       Edinson Mcclure preferred language for health care discussion is english/other.    Is someone accompanying this pt? no    Is the patient using any DME equipment during OV? no    Depression Screening:  Depression: Not at risk (7/5/2024)    PHQ-2     PHQ-2 Score: 0        Learning Assessment:  Who is the primary learner? Patient    What is the preferred language for health care of the primary learner? ENGLISH    How does the primary learner prefer to learn new concepts? DEMONSTRATION    Answered By patient    Relationship to Learner SELF           Pt currently taking Anticoagulant therapy? no    Pt currently taking Antiplatelet therapy ? aspirin      Coordination of Care:  1. Have you been to the ER, urgent care clinic since your last visit? Hospitalized since your last visit? no    2. Have you seen or consulted any other health care providers outside of the [facility] since your last visit? Include any pap smears or colon screening. no

## 2024-07-08 DIAGNOSIS — Z76.0 ENCOUNTER FOR ISSUE OF REPEAT PRESCRIPTION: ICD-10-CM

## 2024-07-08 RX ORDER — TAMSULOSIN HYDROCHLORIDE 0.4 MG/1
CAPSULE ORAL
Qty: 90 CAPSULE | Refills: 1 | Status: SHIPPED | OUTPATIENT
Start: 2024-07-08

## 2024-07-17 NOTE — PROGRESS NOTES
Edinson Mcclure    Chief Complaint   Patient presents with    Follow-up       HPI    Edinson Mcclure is a 84 y.o. man with multiple medical problems including coronary artery disease, cardiomyopathy with ejection fraction of 40 to 45% %, pulmonic stenosis/regurgitation, prior history of endocarditis of the pulmonic valve, paroxysmal atrial fibrillation, pulmonary hypertension, schizophrenia, chronic Amiodarone therapy, hyperlipidemia, diabetes and hypertension.      The patient was seen in early 2019 for follow up of  elevated LFTs. The patient is on Amiodarone therapy with a starting date which was not completely clear. He did require a cardioversion  in 2016 which was likely to have been the time he was started on Amiodarone. His Amiodarone was decreased to 100 mg daily at a prior visit.       He is also on statin therapy.    He comes for routine follow up. He has no CV complaints. Doesn't feel his heart racing, no dizziness, breathing is at baseline on chronic O2.   He does inform me that he has lost more than 30 pounds in the last month.  He has also been losing control of his urine.       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 Faecalis    Enlarged prostate     GERD (gastroesophageal reflux disease)     Glaucoma suspect of both eyes 05/04/2022    Gout     Heart failure (HCC)     Heart murmur     Hematuria     Hyperlipidemia     Hypertension     Hypertensive retinopathy of both eyes 05/04/2022    mild    Incomplete emptying of bladder     Keratoconjunctivitis 02/23/2023    bilateral, mod severe    Keratoconjunctivitis of both eyes 05/04/2022    Lacunar

## 2024-07-30 ENCOUNTER — TELEPHONE (OUTPATIENT)
Age: 84
End: 2024-07-30

## 2024-08-17 DIAGNOSIS — Z76.0 ENCOUNTER FOR ISSUE OF REPEAT PRESCRIPTION: ICD-10-CM

## 2024-08-18 RX ORDER — FUROSEMIDE 20 MG/1
TABLET ORAL
Qty: 90 TABLET | Refills: 1 | Status: SHIPPED | OUTPATIENT
Start: 2024-08-18

## 2024-08-19 RX ORDER — AMIODARONE HYDROCHLORIDE 100 MG/1
100 TABLET ORAL DAILY
Qty: 90 TABLET | Refills: 2 | Status: SHIPPED | OUTPATIENT
Start: 2024-08-19

## 2024-11-11 ENCOUNTER — OFFICE VISIT (OUTPATIENT)
Age: 84
End: 2024-11-11

## 2024-11-11 VITALS
SYSTOLIC BLOOD PRESSURE: 143 MMHG | OXYGEN SATURATION: 94 % | BODY MASS INDEX: 19.63 KG/M2 | HEART RATE: 58 BPM | WEIGHT: 100 LBS | DIASTOLIC BLOOD PRESSURE: 78 MMHG | HEIGHT: 60 IN

## 2024-11-11 DIAGNOSIS — I10 ESSENTIAL HYPERTENSION: ICD-10-CM

## 2024-11-11 DIAGNOSIS — R00.2 PALPITATIONS: Primary | ICD-10-CM

## 2024-11-11 RX ORDER — ONDANSETRON 4 MG/1
4 TABLET, ORALLY DISINTEGRATING ORAL EVERY 8 HOURS PRN
COMMUNITY

## 2024-11-15 NOTE — PROGRESS NOTES
Identified pt with two pt identifiers(name and ). Reviewed record in preparation for visit and have obtained necessary documentation.    Edinson Mcclure presents today for   Chief Complaint   Patient presents with    Follow-up     3-4wk w/ekg         Pt denied DIZZINESS, SOB, CHEST PAIN/ PRESSURE, FATIGUE/WEAKNESS, HEADACHES, SWELLING.             Edinson Mcclure preferred language for health care discussion is english/other.    Personal Protective Equipment:   Personal Protective Equipment was used including: mask-surgical and hands-gloves. Patient was placed on no precaution(s). Patient was not masked.    Precautions:   Patient currently on None  Patient currently roomed with door closed.    Is someone accompanying this pt? yes    Is the patient using any DME equipment during OV?yes    Depression Screenin/5/2024     9:44 AM 2024    12:13 PM 2024     3:00 PM 5/15/2024     2:02 PM 2024     1:09 PM 10/10/2023    11:45 AM 2023    10:58 AM   PHQ-9 Questionaire   Little interest or pleasure in doing things 0 0 0 0 0 0 0   Feeling down, depressed, or hopeless 0 0 0 0 0 0 0   PHQ-9 Total Score 0 0 0 0 0 0 0        Learning Assessment:  No question data found.    Abuse Screenin/11/2024     1:00 PM 3/6/2023    11:00 AM   AMB Abuse Screening   Do you ever feel afraid of your partner? N N   Are you in a relationship with someone who physically or mentally threatens you? N N   Is it safe for you to go home? Y Y          Fall Risk      2024     1:33 PM 2024     9:45 AM 5/15/2024     2:03 PM 2024     1:09 PM 3/6/2023    11:32 AM   Fall Risk   Do you feel unsteady or are you worried about falling?  no no yes no no   2 or more falls in past year? no no no no no   Fall with injury in past year? no no no no no         Pt currently taking Anticoagulant /Antiplatelet therapy?no    Coordination of Care:  1. Have you been to the ER, urgent care clinic since your last visit? 
debility    On good GDMT for CHF  No fluid overload- cont daily Lasix  BP good control    RTC 6 months with EKG      Thank you for allowing me to participate in the care of your patient, please do not hesitate to call with questions or concerns.    Regards,    Andrew Thrasher MD